# Patient Record
Sex: MALE | Race: WHITE | NOT HISPANIC OR LATINO | Employment: FULL TIME | URBAN - METROPOLITAN AREA
[De-identification: names, ages, dates, MRNs, and addresses within clinical notes are randomized per-mention and may not be internally consistent; named-entity substitution may affect disease eponyms.]

---

## 2017-01-05 ENCOUNTER — TELEPHONE (OUTPATIENT)
Dept: SLEEP MEDICINE | Facility: OTHER | Age: 52
End: 2017-01-05

## 2017-01-05 ENCOUNTER — PATIENT MESSAGE (OUTPATIENT)
Dept: ADMINISTRATIVE | Facility: OTHER | Age: 52
End: 2017-01-05

## 2017-01-05 NOTE — TELEPHONE ENCOUNTER
Left message to reschedule his sleep study he canceled on Nov 16th.  Sending out a message through my [facility] to schedule.

## 2017-01-09 ENCOUNTER — TELEPHONE (OUTPATIENT)
Dept: SLEEP MEDICINE | Facility: OTHER | Age: 52
End: 2017-01-09

## 2017-01-12 ENCOUNTER — TELEPHONE (OUTPATIENT)
Dept: SLEEP MEDICINE | Facility: OTHER | Age: 52
End: 2017-01-12

## 2017-01-13 ENCOUNTER — CLINICAL SUPPORT (OUTPATIENT)
Dept: INTERNAL MEDICINE | Facility: CLINIC | Age: 52
End: 2017-01-13
Payer: COMMERCIAL

## 2017-01-13 VITALS — BODY MASS INDEX: 31.13 KG/M2 | WEIGHT: 216.94 LBS

## 2017-01-13 NOTE — PROGRESS NOTES
Health  Follow-Up Note   Date: 1/13/17                 Time: 0730  [x] Office  [] Phone  Notes from previous session reviewed.   [x] Previous Session Goals unchanged.   [] Patient/Caregiver Change in Goals.  Goals added or changed by Patient/Caregiver since program participation:  1.  Get back on track     2. Doing sleep study  3. Getting orthotics for shoes  4. Getting new glasses  5. Started a Health Savings account     Additional/Changed support that patient/caregiver has experienced/sought?  (Indicate readiness, support from family, friends, others, community groups, etc)  1. coworkers     Additional/Changed obstacles that could prevent patient/caregiver from reaching their goals?  1.  Vacation, Holidays    Feedback provided:  1.  Praised for continued effort   2. States he is feeling good, building muscle working out    Diagnostic values/Desriptors for follow-up as needed for chronic condition(s)   Weight: 98.4 kg 216 lb 14.9 oz     Interventions:   1. Health  listened reflectively, validated thoughts and feelings, offered support and encouragement.   2. Allowed patient to express themselves in a non-biased atmosphere.  3. Health  assisted pt to problem-solve obstacles such as being in a challenging environment and dealing with these challenges.   4. Motivational Interviewed interventions utilized (OARS).   5. Patient responded favorably to interventions and remained actively engaged in the session.   6. Health  will remain available and connected for patient by phone and/or office visits.   7. Positive reinforcement, emotional support and encouragement provided.   8. Focused Education: MI, MCT oil    Plan:  [x] Pt will work on goals as stated above.   [x] Pt will contact Health  for any questions, concerns or needs.  [x] Pt will follow up with Health  in office on 2/24/17 @ 0730.       [] Pt will follow up with Health  on phone in:        [x] Health  will remain  available.   [] Health  will contact patient by phone in:        [] Health  will consult:        [] Health  will inform Provider via EPIC messaging.     Impression:  1. Behavior is consistent with  Action     Stage of Change.   2. Participation level:       [x] Receptive      [x] Interactive      [] Guarded and Resistant      [x] Self Motivated      [] Refused/Declined to participate   3. [x] Pt voiced understanding of all information presented.       [] Pt voiced needing further information/education. This will be arranged.       [x] Pt would benefit from further education/information as identified by this health . This will be arranged.     Laura Burch RN HC

## 2017-01-18 DIAGNOSIS — E11.9 TYPE 2 DIABETES MELLITUS WITHOUT COMPLICATION: ICD-10-CM

## 2017-01-19 ENCOUNTER — HOSPITAL ENCOUNTER (OUTPATIENT)
Dept: SLEEP MEDICINE | Facility: OTHER | Age: 52
Discharge: HOME | End: 2017-01-19
Attending: NURSE PRACTITIONER
Payer: COMMERCIAL

## 2017-01-19 ENCOUNTER — PATIENT MESSAGE (OUTPATIENT)
Dept: INTERNAL MEDICINE | Facility: CLINIC | Age: 52
End: 2017-01-19

## 2017-01-19 ENCOUNTER — PATIENT MESSAGE (OUTPATIENT)
Dept: SLEEP MEDICINE | Facility: CLINIC | Age: 52
End: 2017-01-19

## 2017-01-19 DIAGNOSIS — G47.33 OBSTRUCTIVE SLEEP APNEA: ICD-10-CM

## 2017-01-19 PROCEDURE — 95811 POLYSOM 6/>YRS CPAP 4/> PARM: CPT | Mod: 26,,, | Performed by: PSYCHIATRY & NEUROLOGY

## 2017-01-20 PROCEDURE — 95811 POLYSOM 6/>YRS CPAP 4/> PARM: CPT

## 2017-01-23 ENCOUNTER — PATIENT MESSAGE (OUTPATIENT)
Dept: ADMINISTRATIVE | Facility: HOSPITAL | Age: 52
End: 2017-01-23

## 2017-01-30 DIAGNOSIS — G47.33 OBSTRUCTIVE SLEEP APNEA: Primary | ICD-10-CM

## 2017-01-30 NOTE — PROCEDURES
"See imported Sleep Study result in "Chart Review" under the "Media tab".     (This Sleep Study was interpreted by a Board Certified Sleep Specialist who conducted an epoch-by-epoch review of the entire raw data recording.)    (The indication for this sleep study was reviewed and deemed appropriate by [facility] Practice Parameters or other reasons by a Board Certified Sleep Specialist.)    "

## 2017-02-04 ENCOUNTER — TELEPHONE (OUTPATIENT)
Dept: SLEEP MEDICINE | Facility: CLINIC | Age: 52
End: 2017-02-04

## 2017-02-04 NOTE — TELEPHONE ENCOUNTER
----- Message from My Tsai sent at 2/3/2017  3:00 PM CST -----  Regarding: My  Contact: [phone]  Np. Gianni,       I apoligize he does not have Medicare [facility] can service him but he needs to come in to have a F2F before he can get a new unit. I will make him aware. Please have an appointment scheduled with your office.     Thank you.    My

## 2017-02-13 ENCOUNTER — PATIENT MESSAGE (OUTPATIENT)
Dept: SLEEP MEDICINE | Facility: CLINIC | Age: 52
End: 2017-02-13

## 2017-02-16 ENCOUNTER — OFFICE VISIT (OUTPATIENT)
Dept: SLEEP MEDICINE | Facility: CLINIC | Age: 52
End: 2017-02-16
Payer: COMMERCIAL

## 2017-02-16 VITALS
HEIGHT: 70 IN | SYSTOLIC BLOOD PRESSURE: 150 MMHG | HEART RATE: 76 BPM | WEIGHT: 215.38 LBS | DIASTOLIC BLOOD PRESSURE: 94 MMHG | BODY MASS INDEX: 30.83 KG/M2

## 2017-02-16 DIAGNOSIS — G47.33 OBSTRUCTIVE SLEEP APNEA: Primary | ICD-10-CM

## 2017-02-16 PROCEDURE — 3077F SYST BP >= 140 MM HG: CPT | Mod: S$GLB,,, | Performed by: NURSE PRACTITIONER

## 2017-02-16 PROCEDURE — 99999 PR PBB SHADOW E&M-EST. PATIENT-LVL III: CPT | Mod: PBBFAC,,, | Performed by: NURSE PRACTITIONER

## 2017-02-16 PROCEDURE — 3080F DIAST BP >= 90 MM HG: CPT | Mod: S$GLB,,, | Performed by: NURSE PRACTITIONER

## 2017-02-16 PROCEDURE — 99213 OFFICE O/P EST LOW 20 MIN: CPT | Mod: S$GLB,,, | Performed by: NURSE PRACTITIONER

## 2017-02-16 NOTE — PROGRESS NOTES
Wily Rodrigues  was seen as a f/u for the management of obstructive sleep apnea. Last seen 1/14/16.     Since then, he has undergone a titration study and plans to get setup apap 10-20cm. Sleeps only on side. Without therapy, he reports snoring. Rev'd titration stuyd with him today.     HISTORY:  1/14/16:  CHIEF COMPLAINT: CPAP use    HISTORY OF PRESENT ILLNESS:Wily Rodrigues a 51 y.o.  male presents for the management of obstructive sleep apnea. This is his initial visit in Sleep Clinic. Previously seen by Dr. Lee in different dept 2009.  He was diagnosed with AJ previously and has been using it inconsistently of late. Split-night study in 2009 revealed effective pressure of 22/20cm which was intolerable to him. A bipap titration study in 2012 (221#) revealed needed pressures up to 20/16cm to eliminate snoring. Mouth leak and arousals could have contributed to suboptimal pressures. He was switched to autobipap therapy 16/14cm. He has used it inconsistently. He is motivated now to begin using it better. He is seeing a health  (turns 51 next month). He uses a nasal mask. He has resumed using it recently for a short time. Without therapy, he reports snoring. He report symptoms of disruptive snoring, un-refreshing sleep, and excessive daytime sleepiness.     Denies symptoms of restless legs or kicking during sleep.     On todays [place] Sleepiness Scale the patient scores a 13/24.     Interrogation: old remstar machine fair condition, manometer 12cm. Heat at 4. Total therapy hrs 1517h. 30d avg 0:36h/n    PSG 3/27/09: (207#) RDI 9.3 (REM RDI 22.3)/low sat 76%--? 22/20cm bipap  Titration study in epic    BT:11p, 12a off day  SL: 30-60min  Disruptions: nocturia 1-2x  Sleep quality: unrefreshing     FAMILY HISTORY: No known sleep disorders. Parents snore  SOCIAL HISTORY: Rare etoh    REVIEW OF SYSTEMS:  Sleep related symptoms as per HPI; Positive overweight; occasional sinus congestion; +dry mouth in  "mornings;  + dyspnea; Denies palpitations; occasional acid reflux; Denies polyuria; Denies AM headaches;Denies mood disturbance.  Otherwise, a balance of 10 systems reviewed is negative        PHYSICAL EXAM:     Visit Vitals    BP (!) 150/94    Pulse 76    Ht 5' 10" (1.778 m)    Wt 97.7 kg (215 lb 6.2 oz)    BMI 30.91 kg/m2   GENERAL: W/D, W/N body habitus, well groomed         ASSESSMENT:     Obstructive sleep apnea (AJ), mild,previously intolerable to bipap high pressure settings. 2009 switched to autobipap with persistent use of therapy, but limited. Mouth leaks potential contributing factor. Ready to resume consistent use of PAP therapy for improvement of snoring, EDS, disrupted sleep. Needs new supplies/eligible for new machine. 2/16/17: Had titration study and PAP fixed effective for side sleep, unknown for supine REM. Apap setup planned today  He has medical comorbidities of obesity, hypertension, DM2, hypothyroidism.    PLAN:   1. APAP 10-20cm setup today [facility], unable to have appt tomorrow or later today anywhere else. FFM. RTC 4-5 wks adherence monitoring   2. Discussed etiology of AJ and potential ramifications of untreated AJ, including stroke, heart disease, HTN.    3. The patient was advised to abstain from driving should he feel sleepy or drowsy.   4. Encouraged weight loss efforts for potential improvement of AJ and overall health benefits        "

## 2017-02-24 ENCOUNTER — LAB VISIT (OUTPATIENT)
Dept: LAB | Facility: HOSPITAL | Age: 52
End: 2017-02-24
Attending: INTERNAL MEDICINE
Payer: COMMERCIAL

## 2017-02-24 ENCOUNTER — CLINICAL SUPPORT (OUTPATIENT)
Dept: INTERNAL MEDICINE | Facility: CLINIC | Age: 52
End: 2017-02-24
Payer: COMMERCIAL

## 2017-02-24 VITALS — BODY MASS INDEX: 30.97 KG/M2 | WEIGHT: 215.81 LBS

## 2017-02-24 DIAGNOSIS — E11.9 TYPE 2 DIABETES MELLITUS WITHOUT COMPLICATION: ICD-10-CM

## 2017-02-24 PROCEDURE — 36415 COLL VENOUS BLD VENIPUNCTURE: CPT

## 2017-02-24 PROCEDURE — 83036 HEMOGLOBIN GLYCOSYLATED A1C: CPT

## 2017-02-24 NOTE — PROGRESS NOTES
Health  Follow-Up Note   Date: 2/24/                 Time: 1530  [] Office  [x] Phone  Notes from previous session reviewed.   [] Previous Session Goals unchanged.   [] Patient/Caregiver Change in Goals.  Goals added or changed by Patient/Caregiver since program participation:  1.    Gym 2 x week, volleyball 1 x week   2. Walking Qday   3. Using CPAP nightly  4. Continue eliminate sugar     Additional/Changed support that patient/caregiver has experienced/sought?  (Indicate readiness, support from family, friends, others, community groups, etc)  1.  coworkers, friends    Additional/Changed obstacles that could prevent patient/caregiver from reaching their goals?  1.  Julio Cesar Garcia    Feedback provided:  1.  Praised for great job continued effort    Diagnostic values/Desriptors for follow-up as needed for chronic condition(s)   Weight: 79.9 kg 215 lb 13.3 oz    Interventions:   1. Health  listened reflectively, validated thoughts and feelings, offered support and encouragement.   2. Allowed patient to express themselves in a non-biased atmosphere.  3. Health  assisted pt to problem-solve obstacles such as being in a challenging environment and dealing with these challenges.   4. Motivational Interviewed interventions utilized (OARS).   5. Patient responded favorably to interventions and remained actively engaged in the session.   6. Health  will remain available and connected for patient by phone and/or office visits.   7. Positive reinforcement, emotional support and encouragement provided.   8. Focused Education: MI, sugar    Plan:  [x] Pt will work on goals as stated above.   [x] Pt will contact Health  for any questions, concerns or needs.  [x] Pt will follow up with Health  in office 3/15/17 at 730.        [] Pt will follow up with Health  on phone in:        [x] Health  will remain available.   [] Health  will contact patient by phone in:        [] Health  will  consult:        [] Health  will inform Provider via EPIC messaging.     Impression:  1. Behavior is consistent with  Action     Stage of Change.   2. Participation level:       [x] Receptive      [x] Interactive      [] Guarded and Resistant      [x] Self Motivated      [] Refused/Declined to participate   3. [x] Pt voiced understanding of all information presented.       [] Pt voiced needing further information/education. This will be arranged.       [x] Pt would benefit from further education/information as identified by this health . This will be arranged.     Laura Burch RN HC

## 2017-02-25 LAB
ESTIMATED AVG GLUCOSE: 114 MG/DL
HBA1C MFR BLD HPLC: 5.6 %

## 2017-03-15 ENCOUNTER — OFFICE VISIT (OUTPATIENT)
Dept: INTERNAL MEDICINE | Facility: CLINIC | Age: 52
End: 2017-03-15
Payer: COMMERCIAL

## 2017-03-15 VITALS
HEART RATE: 77 BPM | HEIGHT: 70 IN | WEIGHT: 214.5 LBS | DIASTOLIC BLOOD PRESSURE: 95 MMHG | BODY MASS INDEX: 30.71 KG/M2 | SYSTOLIC BLOOD PRESSURE: 158 MMHG

## 2017-03-15 DIAGNOSIS — I10 ESSENTIAL HYPERTENSION: Primary | ICD-10-CM

## 2017-03-15 DIAGNOSIS — Z00.00 ROUTINE CHECK-UP: ICD-10-CM

## 2017-03-15 DIAGNOSIS — E11.9 TYPE 2 DIABETES MELLITUS WITHOUT COMPLICATION, WITHOUT LONG-TERM CURRENT USE OF INSULIN: ICD-10-CM

## 2017-03-15 PROCEDURE — 3080F DIAST BP >= 90 MM HG: CPT | Mod: S$GLB,,, | Performed by: INTERNAL MEDICINE

## 2017-03-15 PROCEDURE — 2022F DILAT RTA XM EVC RTNOPTHY: CPT | Mod: S$GLB,,, | Performed by: INTERNAL MEDICINE

## 2017-03-15 PROCEDURE — 3077F SYST BP >= 140 MM HG: CPT | Mod: S$GLB,,, | Performed by: INTERNAL MEDICINE

## 2017-03-15 PROCEDURE — 4010F ACE/ARB THERAPY RXD/TAKEN: CPT | Mod: S$GLB,,, | Performed by: INTERNAL MEDICINE

## 2017-03-15 PROCEDURE — 99214 OFFICE O/P EST MOD 30 MIN: CPT | Mod: S$GLB,,, | Performed by: INTERNAL MEDICINE

## 2017-03-15 PROCEDURE — 1160F RVW MEDS BY RX/DR IN RCRD: CPT | Mod: S$GLB,,, | Performed by: INTERNAL MEDICINE

## 2017-03-15 PROCEDURE — 99999 PR PBB SHADOW E&M-EST. PATIENT-LVL III: CPT | Mod: PBBFAC,,, | Performed by: INTERNAL MEDICINE

## 2017-03-15 PROCEDURE — 3044F HG A1C LEVEL LT 7.0%: CPT | Mod: S$GLB,,, | Performed by: INTERNAL MEDICINE

## 2017-03-15 RX ORDER — DEXMETHYLPHENIDATE HYDROCHLORIDE 10 MG/1
10 CAPSULE, EXTENDED RELEASE ORAL DAILY
COMMUNITY
End: 2017-03-15

## 2017-03-15 RX ORDER — LISINOPRIL 40 MG/1
40 TABLET ORAL DAILY
Qty: 90 TABLET | Refills: 3 | Status: SHIPPED | OUTPATIENT
Start: 2017-03-15 | End: 2018-02-14 | Stop reason: SDUPTHER

## 2017-03-15 NOTE — MR AVS SNAPSHOT
[facility]  [address]  [facility] [address]  Phone: [phone]  Fax: [phone]                  Wily Rodrigues   3/15/2017 8:00 AM   Office Visit    Description:  Male : 1965   Provider:  Eliseo Rogers Jr., MD   Department:  [facility]           Reason for Visit     Annual Exam           Diagnoses this Visit        Comments    Essential hypertension    -  Primary     Type 2 diabetes mellitus without complication, without long-term current use of insulin         Routine check-up                To Do List           Future Appointments        Provider Department Dept Phone    2017 8:20 AM Eliseo Rogers Jr., MD [facility] [phone]      To Schedule:     Please call the Endoscopy Department at [phone] to schedule your appointment.          Goals (5 Years of Data)     None      Follow-Up and Disposition     Return in about 3 months (around 6/15/2017).       These Medications        Disp Refills Start End    lisinopril (PRINIVIL,ZESTRIL) 40 MG tablet 90 tablet 3 3/15/2017 3/15/2018    Take 1 tablet (40 mg total) by mouth once daily. - Oral    Pharmacy: [id number] - [place], [place] - [address] Ph #: [phone]         [facility] On [facility]     [facility] On Call Nurse Care Line - 24/7 Assistance  Registered nurses in the [facility] On Call Center provide clinical advisement, health education, appointment booking, and other advisory services.  Call for this free service at [phone].             Medications           Message regarding Medications     Verify the changes and/or additions to your medication regime listed below are the same as discussed with your clinician today.  If any of these changes or additions are incorrect, please notify your healthcare provider.        START taking these NEW medications        Refills    lisinopril (PRINIVIL,ZESTRIL) 40 MG tablet 3    Sig: Take 1  "tablet (40 mg total) by mouth once daily.    Class: Normal    Route: Oral      STOP taking these medications     dexmethylphenidate (FOCALIN XR) 10 MG 24 hr capsule Take 10 mg by mouth once daily.           Verify that the below list of medications is an accurate representation of the medications you are currently taking.  If none reported, the list may be blank. If incorrect, please contact your healthcare provider. Carry this list with you in case of emergency.           Current Medications     albuterol (VENTOLIN HFA) 90 mcg/actuation inhaler Inhale 2 puffs into the lungs every 6 (six) hours as needed for Wheezing.    atorvastatin (LIPITOR) 20 MG tablet TAKE 1 TABLET BY MOUTH DAILY    blood sugar diagnostic (ACCU-CHEK SMARTVIEW TEST STRIP) Strp 1 strip by Misc.(Non-Drug; Combo Route) route 2 (two) times daily.    CYMBALTA 60 mg capsule Take 1 capsule by mouth Daily.    lancets (ACCU-CHEK FASTCLIX) Misc 1 lancet by Misc.(Non-Drug; Combo Route) route 2 (two) times daily.    levothyroxine (SYNTHROID) 25 MCG tablet TAKE 1 TABLET BY MOUTH EVERY DAY BEFORE BREAKFAST    methylphenidate (CONCERTA) 36 MG CR tablet Take 1 tablet (36 mg total) by mouth once daily.    triamterene-hydrochlorothiazide 37.5-25 mg (MAXZIDE-25) 37.5-25 mg per tablet Take 1 tablet by mouth once daily.    lisinopril (PRINIVIL,ZESTRIL) 40 MG tablet Take 1 tablet (40 mg total) by mouth once daily.           Clinical Reference Information           Your Vitals Were     BP Pulse Height Weight BMI    158/95 (BP Location: Left arm, Patient Position: Sitting, BP Method: Automatic) 77 5' 10" (1.778 m) 97.3 kg (214 lb 8.1 oz) 30.78 kg/m2      Blood Pressure          Most Recent Value    BP  (!)  158/95      Allergies as of 3/15/2017     Compazine [Prochlorperazine Edisylate]      Immunizations Administered on Date of Encounter - 3/15/2017     None      Orders Placed During Today's Visit      Normal Orders This Visit    Case request GI: COLONOSCOPY     "   Language Assistance Services     ATTENTION: Language assistance services are available, free of charge. Please call [phone].      ATENCIÓN: Si habla tucker, tiene a myers disposición servicios gratuitos de asistencia lingüística. Llame al [phone].     CHÚ Ý: N?u b?n nói Ti?ng Vi?t, có các d?ch v? h? tr? ngôn ng? mi?n phí dành cho b?n. G?i s? [phone].         [facility] complies with applicable Federal civil rights laws and does not discriminate on the basis of race, color, national origin, age, disability, or sex.

## 2017-03-15 NOTE — PROGRESS NOTES
Subjective:       Patient ID: Wily Rodrigues is a 52 y.o. male.    Chief Complaint: Annual Exam    HPI the patient is a 52-year-old male comes in for general checkup and follow-up of his hypertension and diabetes.  The patient reports that his glucose at home have been routinely under good control between 100 and 120.  He has been adhering to his diet fairly well.  His weight has remained relatively stable.  He is not having any chest pain or shortness of breath.  Review of Systems   Constitutional: Negative for appetite change, fatigue and unexpected weight change.   HENT: Negative for congestion and sore throat.    Eyes: Negative for visual disturbance.   Respiratory: Negative for cough, chest tightness, shortness of breath and wheezing.    Cardiovascular: Negative for chest pain and palpitations.   Gastrointestinal: Negative for abdominal distention, abdominal pain, blood in stool, constipation, diarrhea and nausea.   Genitourinary: Negative for difficulty urinating, dysuria, frequency and urgency.   Musculoskeletal: Negative for arthralgias, myalgias and neck stiffness.   Skin: Negative for rash.   Neurological: Negative for dizziness, weakness and headaches.   Psychiatric/Behavioral: Negative for behavioral problems, hallucinations and self-injury.       Objective:      Physical Exam   Constitutional: He is oriented to person, place, and time. He appears well-developed. No distress.   HENT:   Head: Normocephalic.   Mouth/Throat: No oropharyngeal exudate.   Eyes: Conjunctivae and EOM are normal. Pupils are equal, round, and reactive to light. Right eye exhibits no discharge. Left eye exhibits no discharge. No scleral icterus.   Fundi benign bilaterally.   Neck: Normal range of motion. No JVD present. No tracheal deviation present. No thyromegaly present.   Cardiovascular: Normal rate, regular rhythm and normal heart sounds.  Exam reveals no gallop and no friction rub.    No murmur heard.  Pulmonary/Chest:  Effort normal and breath sounds normal. No respiratory distress. He has no wheezes. He has no rales. He exhibits no tenderness.   Abdominal: Soft. Bowel sounds are normal. He exhibits no distension and no mass. There is no tenderness. There is no rebound and no guarding.   Musculoskeletal: Normal range of motion. He exhibits no edema or tenderness.   Lymphadenopathy:     He has no cervical adenopathy.   Neurological: He is alert and oriented to person, place, and time. He has normal reflexes. He displays normal reflexes. No cranial nerve deficit. He exhibits normal muscle tone. Coordination normal.   Skin: Skin is warm and dry. No rash noted. He is not diaphoretic. No erythema. No pallor.   Psychiatric: He has a normal mood and affect. His behavior is normal. Thought content normal.       Assessment:       1. Essential hypertension    2. Type 2 diabetes mellitus without complication, without long-term current use of insulin    3. Routine check-up        Plan:     1.  Increase lisinopril to 40 mg by mouth daily  2.  Screening colonoscopy  3.  Return to clinic in 3 months

## 2017-05-04 RX ORDER — LEVOTHYROXINE SODIUM 25 UG/1
TABLET ORAL
Qty: 90 TABLET | Refills: 0 | Status: SHIPPED | OUTPATIENT
Start: 2017-05-04 | End: 2017-08-17 | Stop reason: SDUPTHER

## 2017-05-16 RX ORDER — ATORVASTATIN CALCIUM 20 MG/1
TABLET, FILM COATED ORAL
Qty: 90 TABLET | Refills: 3 | Status: SHIPPED | OUTPATIENT
Start: 2017-05-16 | End: 2018-08-07 | Stop reason: SDUPTHER

## 2017-06-27 ENCOUNTER — PATIENT MESSAGE (OUTPATIENT)
Dept: INTERNAL MEDICINE | Facility: CLINIC | Age: 52
End: 2017-06-27

## 2017-06-28 ENCOUNTER — PATIENT MESSAGE (OUTPATIENT)
Dept: INTERNAL MEDICINE | Facility: CLINIC | Age: 52
End: 2017-06-28

## 2017-06-28 ENCOUNTER — OFFICE VISIT (OUTPATIENT)
Dept: INTERNAL MEDICINE | Facility: CLINIC | Age: 52
End: 2017-06-28
Payer: COMMERCIAL

## 2017-06-28 VITALS
HEIGHT: 70 IN | HEART RATE: 70 BPM | SYSTOLIC BLOOD PRESSURE: 165 MMHG | WEIGHT: 222.88 LBS | DIASTOLIC BLOOD PRESSURE: 81 MMHG | BODY MASS INDEX: 31.91 KG/M2

## 2017-06-28 DIAGNOSIS — I10 ESSENTIAL HYPERTENSION: ICD-10-CM

## 2017-06-28 DIAGNOSIS — E11.9 TYPE 2 DIABETES MELLITUS WITHOUT COMPLICATION, WITHOUT LONG-TERM CURRENT USE OF INSULIN: Primary | ICD-10-CM

## 2017-06-28 DIAGNOSIS — E03.9 ACQUIRED HYPOTHYROIDISM: ICD-10-CM

## 2017-06-28 PROCEDURE — 3044F HG A1C LEVEL LT 7.0%: CPT | Mod: S$GLB,,, | Performed by: INTERNAL MEDICINE

## 2017-06-28 PROCEDURE — 99213 OFFICE O/P EST LOW 20 MIN: CPT | Mod: S$GLB,,, | Performed by: INTERNAL MEDICINE

## 2017-06-28 PROCEDURE — 99999 PR PBB SHADOW E&M-EST. PATIENT-LVL III: CPT | Mod: PBBFAC,,, | Performed by: INTERNAL MEDICINE

## 2017-06-28 PROCEDURE — 4010F ACE/ARB THERAPY RXD/TAKEN: CPT | Mod: S$GLB,,, | Performed by: INTERNAL MEDICINE

## 2017-06-28 RX ORDER — AMLODIPINE BESYLATE 5 MG/1
5 TABLET ORAL DAILY
Qty: 30 TABLET | Refills: 11 | Status: SHIPPED | OUTPATIENT
Start: 2017-06-28 | End: 2017-10-02

## 2017-06-28 RX ORDER — VALACYCLOVIR HYDROCHLORIDE 1 G/1
1000 TABLET, FILM COATED ORAL 2 TIMES DAILY
Refills: 3 | COMMUNITY
Start: 2017-06-19 | End: 2020-05-17 | Stop reason: SDUPTHER

## 2017-06-28 NOTE — PROGRESS NOTES
Subjective:       Patient ID: Wily Rodrigues is a 52 y.o. male.    Chief Complaint: Follow-up    HPI the patient is a 52-year-old male comes in for follow-up of his hypertension after increasing lisinopril.  He is tolerating the higher dose well.  He has not been monitoring his blood pressure regularly at home.  He is not having any chest pain, shortness of breath, or cough.  He is noticing mild exacerbations of dyspnea on exertion with playing volleyball and has curtailed his activities.  He states that this typically happens in warm humid weather.  He has noticed a cyst on his right back for which she requested further evaluation  Review of Systems   Constitutional: Negative.  Negative for activity change and unexpected weight change.   Respiratory: Negative for chest tightness and shortness of breath.    Cardiovascular: Negative for chest pain and palpitations.       Objective:      Physical Exam   Constitutional: He appears well-developed and well-nourished. No distress.   Cardiovascular: Normal rate, regular rhythm and normal heart sounds.  Exam reveals no gallop and no friction rub.    No murmur heard.  Pulmonary/Chest: Effort normal and breath sounds normal. No respiratory distress. He has no wheezes. He has no rales.   Skin: He is not diaphoretic.   A 3 cm cyst is present near the right scapula on the back.  It's nontender.  There is slight redness overlying it.   Vitals reviewed.      Assessment:       1. Type 2 diabetes mellitus without complication, without long-term current use of insulin    2. Essential hypertension    3. Acquired hypothyroidism      4.  Right back cyst  Plan:       1.  Intensify antihypertensive therapy with Norvasc 5 mg by mouth daily  2.  Return to clinic in 3 months for physical exam with previsit laboratory studies

## 2017-07-06 ENCOUNTER — CLINICAL SUPPORT (OUTPATIENT)
Dept: INTERNAL MEDICINE | Facility: CLINIC | Age: 52
End: 2017-07-06
Payer: COMMERCIAL

## 2017-07-06 VITALS — DIASTOLIC BLOOD PRESSURE: 88 MMHG | SYSTOLIC BLOOD PRESSURE: 140 MMHG

## 2017-07-06 PROCEDURE — 99999 PR PBB SHADOW E&M-EST. PATIENT-LVL I: CPT | Mod: PBBFAC,,,

## 2017-07-06 NOTE — PROGRESS NOTES
Health  Follow-Up Note   [x] Office  [] Phone  Notes from previous session reviewed.   [x] Previous Session Goals unchanged.   [x] Patient/Caregiver Change in Goals.  Goals added or changed by Patient/Caregiver since program participation:  Lose weight (10-20 lbs.)   Bring Blood pressure down   Reduce carb intake / re-focus food choices   More walking/biking   Try to breathe in July and August with asthma   Meditate and laugh   Serenity/world peace     Additional/Changed support that patient/caregiver has experienced/sought?  (Indicate readiness, support from family, friends, others, community groups, etc)  1.  New room mate eats healthy    Additional/Changed obstacles that could prevent patient/caregiver from reaching their goals?  1.  Heat/ asthma    Feedback provided:  1. Praised for continued effort   2. MI  3. Continued support     Diagnostic values/Desriptors for follow-up as needed for chronic condition(s)   Weight:100.7 kg 222 lb  Blood Glucose: 134 this am  /88 today in office - down from last visit at PCP(165/81)    Interventions:   1. Health  listened reflectively, validated thoughts and feelings, offered support and encouragement.   2. Allowed patient to express themselves in a non-biased atmosphere.  3. Health  assisted pt to problem-solve obstacles such as being in a challenging environment and dealing with these challenges.   4. Motivational Interviewed interventions utilized (OARS).   5. Patient responded favorably to interventions and remained actively engaged in the session.   6. Health  will remain available and connected for patient by phone and/or office visits.   7. Positive reinforcement, emotional support and encouragement provided.   8. Focused Education: MI, recipes, exercise    Plan:  [x] Pt will work on goals as stated above.   [x] Pt will contact Health  for any questions, concerns or needs.  [x] Pt will follow up with Health  in office on   7/20/17 at  483     [] Pt will follow up with Health  on phone in:        [x] Health  will remain available.   [] Health  will contact patient by phone in:        [] Health  will consult:        [] Health  will inform Provider via EPIC messaging.     Impression:  1. Behavior is consistent with  Action     Stage of Change.   2. Participation level:       [x] Receptive      [x] Interactive      [] Guarded and Resistant      [x] Self Motivated      [] Refused/Declined to participate   3. [x] Pt voiced understanding of all information presented.       [] Pt voiced needing further information/education. This will be arranged.       [x] Pt would benefit from further education/information as identified by this health . This will be arranged.     Laura Burch RN HC

## 2017-07-27 ENCOUNTER — CLINICAL SUPPORT (OUTPATIENT)
Dept: INTERNAL MEDICINE | Facility: CLINIC | Age: 52
End: 2017-07-27
Payer: COMMERCIAL

## 2017-07-27 VITALS
BODY MASS INDEX: 29.73 KG/M2 | DIASTOLIC BLOOD PRESSURE: 78 MMHG | WEIGHT: 207.25 LBS | SYSTOLIC BLOOD PRESSURE: 134 MMHG

## 2017-07-27 PROCEDURE — 99999 PR PBB SHADOW E&M-EST. PATIENT-LVL I: CPT | Mod: PBBFAC,,,

## 2017-07-27 NOTE — PROGRESS NOTES
Health  Follow-Up Note   [x] Office  [] Phone  Notes from previous session reviewed.   [x] Previous Session Goals unchanged.   [] Patient/Caregiver Change in Goals.  Goals added or changed by Patient/Caregiver since program participation:  1.   Continue same plan       Additional/Changed obstacles that could prevent patient/caregiver from reaching their goals?  1.  Going on vacation today to [facility] for 10 days.     Feedback provided:  1.  Praised for great job down 15 lbs in 3 weeks!!    Diagnostic values/Desriptors for follow-up as needed for chronic condition(s)   Weight: 94 kg 207 lbs down 15 lbs   Blood Glucose: 100 this am  B/P: 134/78    Interventions:   1. Health  listened reflectively, validated thoughts and feelings, offered support and encouragement.   2. Allowed patient to express themselves in a non-biased atmosphere.  3. Health  assisted pt to problem-solve obstacles such as being in a challenging environment and dealing with these challenges.   4. Motivational Interviewed interventions utilized (OARS).   5. Patient responded favorably to interventions and remained actively engaged in the session.   6. Health  will remain available and connected for patient by phone and/or office visits.   7. Positive reinforcement, emotional support and encouragement provided.   8. Focused Education: MI, continue to decrease carbs (off bread) working well    Plan:  [x] Pt will work on goals as stated above.   [x] Pt will contact Health  for any questions, concerns or needs.  [x] Pt will follow up with Health  in office on    8/11/17 at 0730.   [] Pt will follow up with Health  on phone in:        [x] Health  will remain available.   [] Health  will contact patient by phone in:        [] Health  will consult:        [] Health  will inform Provider via EPIC messaging.     Impression:  1. Behavior is consistent with    Action   Stage of Change.    2. Participation level:       [x] Receptive      [x] Interactive      [] Guarded and Resistant      [x] Self Motivated      [] Refused/Declined to participate   3. [x] Pt voiced understanding of all information presented.       [] Pt voiced needing further information/education. This will be arranged.       [x] Pt would benefit from further education/information as identified by this health . This will be arranged.     Laura Burch RN HC

## 2017-08-18 RX ORDER — LEVOTHYROXINE SODIUM 25 UG/1
TABLET ORAL
Qty: 90 TABLET | Refills: 0 | Status: SHIPPED | OUTPATIENT
Start: 2017-08-18 | End: 2017-10-17 | Stop reason: SDUPTHER

## 2017-08-24 ENCOUNTER — CLINICAL SUPPORT (OUTPATIENT)
Dept: INTERNAL MEDICINE | Facility: CLINIC | Age: 52
End: 2017-08-24
Payer: COMMERCIAL

## 2017-08-24 VITALS
BODY MASS INDEX: 30.24 KG/M2 | SYSTOLIC BLOOD PRESSURE: 120 MMHG | WEIGHT: 210.75 LBS | DIASTOLIC BLOOD PRESSURE: 84 MMHG

## 2017-08-24 PROCEDURE — 99999 PR PBB SHADOW E&M-EST. PATIENT-LVL I: CPT | Mod: PBBFAC,,,

## 2017-08-24 NOTE — PROGRESS NOTES
Health  Follow-Up Note   [x] Office  [] Phone  Notes from previous session reviewed.   [x] Previous Session Goals unchanged.   [] Patient/Caregiver Change in Goals.  Goals added or changed by Patient/Caregiver since program participation:  1.     Continue same plan stay on track      Additional/Changed support that patient/caregiver has experienced/sought?  (Indicate readiness, support from family, friends, others, community groups, etc)  1.  friends     Additional/Changed obstacles that could prevent patient/caregiver from reaching their goals?  1.  vacation    Feedback provided:  1. Praised for continued effort and determination     Diagnostic values/Desriptors for follow-up as needed for chronic condition(s)   Weight:  Blood Glucose: 119  /84 left arm reg cuff  128/86 right arm reg cuff    Interventions:   1. Health  listened reflectively, validated thoughts and feelings, offered support and encouragement.   2. Allowed patient to express themselves in a non-biased atmosphere.  3. Health  assisted pt to problem-solve obstacles such as being in a challenging environment and dealing with these challenges.   4. Motivational Interviewed interventions utilized (OARS).   5. Patient responded favorably to interventions and remained actively engaged in the session.   6. Health  will remain available and connected for patient by phone and/or office visits.   7. Positive reinforcement, emotional support and encouragement provided.   8. Focused Education: MI    Plan:  [x] Pt will work on goals as stated above.   [x] Pt will contact Health  for any questions, concerns or needs.  [x] Pt will follow up with Health  in office on   9/8/17 at 0730.     [] Pt will follow up with Health  on phone in:        [x] Health  will remain available.   [] Health  will contact patient by phone in:        [] Health  will consult:        [] Health  will inform Provider via [place]  messaging.     Impression:  1. Behavior is consistent with   Action    Stage of Change.   2. Participation level:       [x] Receptive      [x] Interactive      [] Guarded and Resistant      [x] Self Motivated      [] Refused/Declined to participate   3. [x] Pt voiced understanding of all information presented.       [] Pt voiced needing further information/education. This will be arranged.       [x] Pt would benefit from further education/information as identified by this health . This will be arranged.     Laura Burch RN HC

## 2017-09-08 ENCOUNTER — CLINICAL SUPPORT (OUTPATIENT)
Dept: INTERNAL MEDICINE | Facility: CLINIC | Age: 52
End: 2017-09-08
Payer: COMMERCIAL

## 2017-09-08 VITALS
WEIGHT: 215.38 LBS | SYSTOLIC BLOOD PRESSURE: 118 MMHG | BODY MASS INDEX: 30.91 KG/M2 | DIASTOLIC BLOOD PRESSURE: 80 MMHG

## 2017-09-08 NOTE — PROGRESS NOTES
Health  Follow-Up Note   [x] Office  [] Phone  Notes from previous session reviewed.   [x] Previous Session Goals unchanged.   [] Patient/Caregiver Change in Goals.  Goals added or changed by Patient/Caregiver since program participation:  1.  Get back on track  2. Start exercise with Volley ball this week     Additional/Changed support that patient/caregiver has experienced/sought?  (Indicate readiness, support from family, friends, others, community groups, etc)  1.  Friends Volleyball    Additional/Changed obstacles that could prevent patient/caregiver from reaching their goals?  1.  Not wearing Cpap    Feedback provided:  1.  Praised for continued effort    Diagnostic values/Desriptors for follow-up as needed for chronic condition(s)   Weight: 97.7 kg 215 lb 6.2 oz  Blood Glucose: 124  /80 left arm    Interventions:   1. Health  listened reflectively, validated thoughts and feelings, offered support and encouragement.   2. Allowed patient to express themselves in a non-biased atmosphere.  3. Health  assisted pt to problem-solve obstacles such as being in a challenging environment and dealing with these challenges.   4. Motivational Interviewed interventions utilized (OARS).   5. Patient responded favorably to interventions and remained actively engaged in the session.   6. Health  will remain available and connected for patient by phone and/or office visits.   7. Positive reinforcement, emotional support and encouragement provided.   8. Focused Education: MI    Plan:  [x] Pt will work on goals as stated above.   [x] Pt will contact Health  for any questions, concerns or needs.  [x] Pt will follow up with Health  in office on   9/22/17@ 730    [] Pt will follow up with Health  on phone in:        [x] Health  will remain available.   [] Health  will contact patient by phone in:        [] Health  will consult:        [] Health  will inform Provider via  EPIC messaging.     Impression:  1. Behavior is consistent with   Action    Stage of Change.   2. Participation level:       [x] Receptive      [x] Interactive      [] Guarded and Resistant      [x] Self Motivated      [] Refused/Declined to participate   3. [x] Pt voiced understanding of all information presented.       [] Pt voiced needing further information/education. This will be arranged.       [x] Pt would benefit from further education/information as identified by this health . This will be arranged.     Laura Burch RN HC

## 2017-09-13 RX ORDER — TRIAMTERENE/HYDROCHLOROTHIAZID 37.5-25 MG
1 TABLET ORAL DAILY
Qty: 90 TABLET | Refills: 0 | Status: SHIPPED | OUTPATIENT
Start: 2017-09-13 | End: 2017-10-17 | Stop reason: SDUPTHER

## 2017-09-22 ENCOUNTER — CLINICAL SUPPORT (OUTPATIENT)
Dept: INTERNAL MEDICINE | Facility: CLINIC | Age: 52
End: 2017-09-22
Payer: COMMERCIAL

## 2017-09-22 VITALS
SYSTOLIC BLOOD PRESSURE: 110 MMHG | BODY MASS INDEX: 30.46 KG/M2 | DIASTOLIC BLOOD PRESSURE: 76 MMHG | WEIGHT: 212.31 LBS

## 2017-09-22 PROCEDURE — 99999 PR PBB SHADOW E&M-EST. PATIENT-LVL I: CPT | Mod: PBBFAC,,,

## 2017-09-22 NOTE — PROGRESS NOTES
Health  Follow-Up Note   [x] Office  [] Phone  Notes from previous session reviewed.   [x] Previous Session Goals unchanged.   [] Patient/Caregiver Change in Goals.  Goals added or changed by Patient/Caregiver since program participation:  1.     Continue same plan  2. Add 3 min workout     Additional/Changed support that patient/caregiver has experienced/sought?  (Indicate readiness, support from family, friends, others, community groups, etc)  1.  Friends    Additional/Changed obstacles that could prevent patient/caregiver from reaching their goals?  1.  lack of preparation    Feedback provided:  1.  Praised for continued effort    Diagnostic values/Desriptors for follow-up as needed for chronic condition(s)   Weight: 96.3 kg 212 lb 4.9 oz  Blood Glucose: 134 while drinking coffee  /76    Interventions:   1. Health  listened reflectively, validated thoughts and feelings, offered support and encouragement.   2. Allowed patient to express themselves in a non-biased atmosphere.  3. Health  assisted pt to problem-solve obstacles such as being in a challenging environment and dealing with these challenges.   4. Motivational Interviewed interventions utilized (OARS).   5. Patient responded favorably to interventions and remained actively engaged in the session.   6. Health  will remain available and connected for patient by phone and/or office visits.   7. Positive reinforcement, emotional support and encouragement provided.   8. Focused Education: MI    Plan:  [x] Pt will work on goals as stated above.   [x] Pt will contact Health  for any questions, concerns or needs.  [x] Pt will follow up with Health  in office on    10/2/17 at 0830.    [] Pt will follow up with Health  on phone in:        [x] Health  will remain available.   [] Health  will contact patient by phone in:        [] Health  will consult:        [] Health  will inform Provider via [employer]  messaging.     Impression:  1. Behavior is consistent with     Action  Stage of Change.   2. Participation level:       [x] Receptive      [x] Interactive      [] Guarded and Resistant      [x] Self Motivated      [] Refused/Declined to participate   3. [x] Pt voiced understanding of all information presented.       [] Pt voiced needing further information/education. This will be arranged.       [x] Pt would benefit from further education/information as identified by this health . This will be arranged.     Laura Burch RN HC

## 2017-09-28 ENCOUNTER — LAB VISIT (OUTPATIENT)
Dept: LAB | Facility: HOSPITAL | Age: 52
End: 2017-09-28
Attending: INTERNAL MEDICINE
Payer: COMMERCIAL

## 2017-09-28 DIAGNOSIS — E11.9 TYPE 2 DIABETES MELLITUS WITHOUT COMPLICATION, WITHOUT LONG-TERM CURRENT USE OF INSULIN: ICD-10-CM

## 2017-09-28 DIAGNOSIS — E03.9 ACQUIRED HYPOTHYROIDISM: ICD-10-CM

## 2017-09-28 DIAGNOSIS — I10 ESSENTIAL HYPERTENSION: ICD-10-CM

## 2017-09-28 LAB
ALBUMIN SERPL BCP-MCNC: 4.1 G/DL
ALP SERPL-CCNC: 108 U/L
ALT SERPL W/O P-5'-P-CCNC: 44 U/L
ANION GAP SERPL CALC-SCNC: 14 MMOL/L
AST SERPL-CCNC: 30 U/L
BILIRUB SERPL-MCNC: 0.3 MG/DL
BUN SERPL-MCNC: 18 MG/DL
CALCIUM SERPL-MCNC: 9.6 MG/DL
CHLORIDE SERPL-SCNC: 102 MMOL/L
CHOLEST SERPL-MCNC: 179 MG/DL
CHOLEST/HDLC SERPL: 4.7 {RATIO}
CO2 SERPL-SCNC: 24 MMOL/L
CREAT SERPL-MCNC: 0.9 MG/DL
EST. GFR  (AFRICAN AMERICAN): >60 ML/MIN/1.73 M^2
EST. GFR  (NON AFRICAN AMERICAN): >60 ML/MIN/1.73 M^2
ESTIMATED AVG GLUCOSE: 120 MG/DL
GLUCOSE SERPL-MCNC: 113 MG/DL
HBA1C MFR BLD HPLC: 5.8 %
HDLC SERPL-MCNC: 38 MG/DL
HDLC SERPL: 21.2 %
LDLC SERPL CALC-MCNC: 95.8 MG/DL
NONHDLC SERPL-MCNC: 141 MG/DL
POTASSIUM SERPL-SCNC: 4.4 MMOL/L
PROT SERPL-MCNC: 7.6 G/DL
SODIUM SERPL-SCNC: 140 MMOL/L
TRIGL SERPL-MCNC: 226 MG/DL
TSH SERPL DL<=0.005 MIU/L-ACNC: 3.69 UIU/ML

## 2017-09-28 PROCEDURE — 80053 COMPREHEN METABOLIC PANEL: CPT

## 2017-09-28 PROCEDURE — 84443 ASSAY THYROID STIM HORMONE: CPT

## 2017-09-28 PROCEDURE — 80061 LIPID PANEL: CPT

## 2017-09-28 PROCEDURE — 36415 COLL VENOUS BLD VENIPUNCTURE: CPT

## 2017-09-28 PROCEDURE — 83036 HEMOGLOBIN GLYCOSYLATED A1C: CPT

## 2017-10-02 ENCOUNTER — PATIENT OUTREACH (OUTPATIENT)
Dept: ADMINISTRATIVE | Facility: HOSPITAL | Age: 52
End: 2017-10-02

## 2017-10-02 ENCOUNTER — OFFICE VISIT (OUTPATIENT)
Dept: INTERNAL MEDICINE | Facility: CLINIC | Age: 52
End: 2017-10-02
Payer: COMMERCIAL

## 2017-10-02 ENCOUNTER — CLINICAL SUPPORT (OUTPATIENT)
Dept: INTERNAL MEDICINE | Facility: CLINIC | Age: 52
End: 2017-10-02
Payer: COMMERCIAL

## 2017-10-02 VITALS
WEIGHT: 212.5 LBS | BODY MASS INDEX: 30.42 KG/M2 | HEART RATE: 76 BPM | HEIGHT: 70 IN | OXYGEN SATURATION: 98 % | SYSTOLIC BLOOD PRESSURE: 120 MMHG | BODY MASS INDEX: 30.27 KG/M2 | WEIGHT: 211 LBS | DIASTOLIC BLOOD PRESSURE: 78 MMHG

## 2017-10-02 DIAGNOSIS — I10 ESSENTIAL HYPERTENSION: Primary | ICD-10-CM

## 2017-10-02 DIAGNOSIS — E11.9 TYPE 2 DIABETES MELLITUS WITHOUT COMPLICATION, WITHOUT LONG-TERM CURRENT USE OF INSULIN: ICD-10-CM

## 2017-10-02 DIAGNOSIS — E03.9 ACQUIRED HYPOTHYROIDISM: ICD-10-CM

## 2017-10-02 PROCEDURE — 3008F BODY MASS INDEX DOCD: CPT | Mod: S$GLB,,, | Performed by: INTERNAL MEDICINE

## 2017-10-02 PROCEDURE — 3074F SYST BP LT 130 MM HG: CPT | Mod: S$GLB,,, | Performed by: INTERNAL MEDICINE

## 2017-10-02 PROCEDURE — 99214 OFFICE O/P EST MOD 30 MIN: CPT | Mod: S$GLB,,, | Performed by: INTERNAL MEDICINE

## 2017-10-02 PROCEDURE — 3078F DIAST BP <80 MM HG: CPT | Mod: S$GLB,,, | Performed by: INTERNAL MEDICINE

## 2017-10-02 PROCEDURE — 99999 PR PBB SHADOW E&M-EST. PATIENT-LVL I: CPT | Mod: PBBFAC,,,

## 2017-10-02 PROCEDURE — 99999 PR PBB SHADOW E&M-EST. PATIENT-LVL III: CPT | Mod: PBBFAC,,, | Performed by: INTERNAL MEDICINE

## 2017-10-02 NOTE — PROGRESS NOTES
Subjective:       Patient ID: Wily Rodrigues is a 52 y.o. male.    Chief Complaint: Annual Exam    HPI the patient is a 52-year-old male comes in for an annual checkup.  He is junk been doing well.  He is trying to adhere to his diet as recommended by his health .  He is also trying to comply more fruitful with his CPAP device at home.  The patient is not reporting any chest pain or shortness of breath.  He is not had any significant episodes of asthma.  Review of Systems   Constitutional: Negative for appetite change, fatigue and unexpected weight change.   HENT: Negative for congestion and sore throat.    Eyes: Negative for visual disturbance.   Respiratory: Negative for cough, chest tightness, shortness of breath and wheezing.    Cardiovascular: Negative for chest pain and palpitations.   Gastrointestinal: Negative for abdominal distention, abdominal pain, blood in stool, constipation, diarrhea and nausea.   Genitourinary: Negative for difficulty urinating, dysuria, frequency and urgency.   Musculoskeletal: Negative for arthralgias, myalgias and neck stiffness.   Skin: Negative for rash.   Neurological: Negative for dizziness, weakness and headaches.   Psychiatric/Behavioral: Negative for behavioral problems, hallucinations and self-injury.       Objective:      Physical Exam   Constitutional: He is oriented to person, place, and time. He appears well-developed. No distress.   HENT:   Head: Normocephalic.   Mouth/Throat: No oropharyngeal exudate.   Eyes: Conjunctivae and EOM are normal. Pupils are equal, round, and reactive to light. Right eye exhibits no discharge. Left eye exhibits no discharge. No scleral icterus.   Fundi benign bilaterally.   Neck: Normal range of motion. No JVD present. No tracheal deviation present. No thyromegaly present.   Cardiovascular: Normal rate, regular rhythm and normal heart sounds.  Exam reveals no gallop and no friction rub.    No murmur heard.  Pulmonary/Chest: Effort  normal and breath sounds normal. No respiratory distress. He has no wheezes. He has no rales. He exhibits no tenderness.   Abdominal: Soft. Bowel sounds are normal. He exhibits no distension and no mass. There is no tenderness. There is no rebound and no guarding.   Musculoskeletal: Normal range of motion. He exhibits no edema or tenderness.   Lymphadenopathy:     He has no cervical adenopathy.   Neurological: He is alert and oriented to person, place, and time. He has normal reflexes. He displays normal reflexes. No cranial nerve deficit. He exhibits normal muscle tone. Coordination normal.   Skin: Skin is warm and dry. No rash noted. He is not diaphoretic. No erythema. No pallor.   Psychiatric: He has a normal mood and affect. His behavior is normal. Thought content normal.     foot exam: Onychomycosis on the left great toe.  Sensation intact to monofilament line bilaterally.  Good dorsalis pedis pulses bilaterally.  Assessment:       1. Essential hypertension    2. Type 2 diabetes mellitus without complication, without long-term current use of insulin    3. Acquired hypothyroidism        Plan:       1.  The patient's labs were reviewed and are unremarkable.  2.  Patient agrees to proceed beginning colonoscopy  3.  Return to clinic in 6 months

## 2017-10-02 NOTE — PROGRESS NOTES
Health  Follow-Up Note   [x] Office  [] Phone  Notes from previous session reviewed.   [x] Previous Session Goals unchanged.   [] Patient/Caregiver Change in Goals.  Goals added or changed by Patient/Caregiver since program participation:  1.   Continue same plan       Additional/Changed support that patient/caregiver has experienced/sought?  (Indicate readiness, support from family, friends, others, community groups, etc)  1.  friends, volleyball team    Additional/Changed obstacles that could prevent patient/caregiver from reaching their goals?  1.  Heat, humidity  2. Unprepared for meals    Feedback provided:  1.  Praised for continue effort and determination    Diagnostic values/Desriptors for follow-up as needed for chronic condition(s)   Weight: 95.7 kg 210 lb 15.7 oz down 1.54 lbs from last visit total 16 lbs  Labs improved Cholesterol and triglycerides, A1c up slightly from 5.6 to 5.8    Interventions:   1. Health  listened reflectively, validated thoughts and feelings, offered support and encouragement.   2. Allowed patient to express themselves in a non-biased atmosphere.  3. Health  assisted pt to problem-solve obstacles such as being in a challenging environment and dealing with these challenges.   4. Motivational Interviewed interventions utilized (OARS).   5. Patient responded favorably to interventions and remained actively engaged in the session.   6. Health  will remain available and connected for patient by phone and/or office visits.   7. Positive reinforcement, emotional support and encouragement provided.   8. Focused Education: MI, recipes    Plan:  [x] Pt will work on goals as stated above.   [x] Pt will contact Health  for any questions, concerns or needs.  [x] Pt will follow up with Health  in office on   Will call to schedule     [] Pt will follow up with Health  on phone in:        [x] Health  will remain available.   [] Health  will contact  patient by phone in:        [] Health  will consult:        [] Health  will inform Provider via EPIC messaging.     Impression:  1. Behavior is consistent with  Action     Stage of Change.   2. Participation level:       [x] Receptive      [x] Interactive      [] Guarded and Resistant      [x] Self Motivated      [] Refused/Declined to participate   3. [x] Pt voiced understanding of all information presented.       [] Pt voiced needing further information/education. This will be arranged.       [x] Pt would benefit from further education/information as identified by this health . This will be arranged.     Laura Burch RN HC

## 2017-10-03 ENCOUNTER — PATIENT OUTREACH (OUTPATIENT)
Dept: ADMINISTRATIVE | Facility: HOSPITAL | Age: 52
End: 2017-10-03

## 2017-10-18 RX ORDER — LEVOTHYROXINE SODIUM 25 UG/1
TABLET ORAL
Qty: 90 TABLET | Refills: 0 | Status: SHIPPED | OUTPATIENT
Start: 2017-10-18 | End: 2018-02-15 | Stop reason: SDUPTHER

## 2017-10-18 RX ORDER — TRIAMTERENE/HYDROCHLOROTHIAZID 37.5-25 MG
1 TABLET ORAL DAILY
Qty: 90 TABLET | Refills: 0 | Status: SHIPPED | OUTPATIENT
Start: 2017-10-18 | End: 2018-08-07

## 2017-11-17 ENCOUNTER — CLINICAL SUPPORT (OUTPATIENT)
Dept: INTERNAL MEDICINE | Facility: CLINIC | Age: 52
End: 2017-11-17
Payer: COMMERCIAL

## 2017-11-17 VITALS — WEIGHT: 219.56 LBS | BODY MASS INDEX: 31.51 KG/M2

## 2017-11-17 NOTE — PROGRESS NOTES
Health  Follow-Up Note   [x] Office  [] Phone  Notes from previous session reviewed.   [x] Previous Session Goals unchanged.   [] Patient/Caregiver Change in Goals.  Goals added or changed by Patient/Caregiver since program participation:  1. Get back on track  2. Try eating no processed foods      3. Scheduled eye exam     Additional/Changed support that patient/caregiver has experienced/sought?  (Indicate readiness, support from family, friends, others, community groups, etc)  1.  getting new roommate    Additional/Changed obstacles that could prevent patient/caregiver from reaching their goals?  1.  Stress old roommate kicked out  2. Work load in trial    Feedback provided:  1.  Praised for continued effort and determination    Diagnostic values/Desriptors for follow-up as needed for chronic condition(s)   Weight: 99.6 kg 219.58 lb   Blood Glucose: 129 with coffee    Interventions:   1. Health  listened reflectively, validated thoughts and feelings, offered support and encouragement.   2. Allowed patient to express themselves in a non-biased atmosphere.  3. Health  assisted pt to problem-solve obstacles such as being in a challenging environment and dealing with these challenges.   4. Motivational Interviewed interventions utilized (OARS).   5. Patient responded favorably to interventions and remained actively engaged in the session.   6. Health  will remain available and connected for patient by phone and/or office visits.   7. Positive reinforcement, emotional support and encouragement provided.   8. Focused Education: MI    Plan:  [x] Pt will work on goals as stated above.   [x] Pt will contact Health  for any questions, concerns or needs.  [x] Pt will follow up with Health  in office on   12/1/17 at 730.    [] Pt will follow up with Health  on phone in:        [x] Health  will remain available.   [] Health  will contact patient by phone in:        [] Health   will consult:        [] Health  will inform Provider via EPIC messaging.     Impression:  1. Behavior is consistent with   Action   Stage of Change.   2. Participation level:       [x] Receptive      [x] Interactive      [] Guarded and Resistant      [x] Self Motivated      [] Refused/Declined to participate   3. [x] Pt voiced understanding of all information presented.       [] Pt voiced needing further information/education. This will be arranged.       [x] Pt would benefit from further education/information as identified by this health . This will be arranged.     Laura Burch RN HC

## 2017-11-28 DIAGNOSIS — Z12.9 SCREENING FOR CANCER: Primary | ICD-10-CM

## 2017-12-01 ENCOUNTER — CLINICAL SUPPORT (OUTPATIENT)
Dept: INTERNAL MEDICINE | Facility: CLINIC | Age: 52
End: 2017-12-01
Payer: COMMERCIAL

## 2017-12-01 VITALS — BODY MASS INDEX: 30.72 KG/M2 | WEIGHT: 214.06 LBS

## 2017-12-01 NOTE — PROGRESS NOTES
Health  Follow-Up Note   [x] Office  [] Phone  Notes from previous session reviewed.   [x] Previous Session Goals unchanged.   [] Patient/Caregiver Change in Goals.  Goals added or changed by Patient/Caregiver since program participation:  1.  Stay on track    2. Eye exam on 12/15/17  3. Do FOBT  Test Kit given to him today with instructions per Edna     Additional/Changed support that patient/caregiver has experienced/sought?  (Indicate readiness, support from family, friends, others, community groups, etc)  1.  Group of friends doing weight loss contest    Additional/Changed obstacles that could prevent patient/caregiver from reaching their goals?  1.  Holidays    Feedback provided:  1.  Praised for great job down 5.5 lbs from last visit 2 weeks ago    Diagnostic values/Desriptors for follow-up as needed for chronic condition(s)   Weight: 97.1 kg 214.07 lb  A/c 161 just finished oatmeal with granola    Interventions:   1. Health  listened reflectively, validated thoughts and feelings, offered support and encouragement.   2. Allowed patient to express themselves in a non-biased atmosphere.  3. Health  assisted pt to problem-solve obstacles such as being in a challenging environment and dealing with these challenges.   4. Motivational Interviewed interventions utilized (OARS).   5. Patient responded favorably to interventions and remained actively engaged in the session.   6. Health  will remain available and connected for patient by phone and/or office visits.   7. Positive reinforcement, emotional support and encouragement provided.   8. Focused Education: MI, exercise 4 min Dr. Javier WRIGHT lecture    Plan:  [x] Pt will work on goals as stated above.   [x] Pt will contact Health  for any questions, concerns or needs.  [x] Pt will follow up with Health  in office on   12/15/17 at 730.     [] Pt will follow up with Health  on phone in:        [x] Health  will remain  available.   [] Health  will contact patient by phone in:        [] Health  will consult:        [] Health  will inform Provider via EPIC messaging.     Impression:  1. Behavior is consistent with    Action   Stage of Change.   2. Participation level:       [x] Receptive      [x] Interactive      [] Guarded and Resistant      [x] Self Motivated      [] Refused/Declined to participate   3. [x] Pt voiced understanding of all information presented.       [] Pt voiced needing further information/education. This will be arranged.       [x] Pt would benefit from further education/information as identified by this health . This will be arranged.     Laura Burch RN HC

## 2017-12-15 ENCOUNTER — INITIAL CONSULT (OUTPATIENT)
Dept: OPTOMETRY | Facility: CLINIC | Age: 52
End: 2017-12-15
Payer: COMMERCIAL

## 2017-12-15 DIAGNOSIS — E11.9 TYPE 2 DIABETES MELLITUS WITHOUT RETINOPATHY: Primary | ICD-10-CM

## 2017-12-15 DIAGNOSIS — Q14.1 CONGENITAL HYPERTROPHY OF RETINAL PIGMENT EPITHELIUM: ICD-10-CM

## 2017-12-15 DIAGNOSIS — H52.13 MYOPIA WITH ASTIGMATISM AND PRESBYOPIA, BILATERAL: ICD-10-CM

## 2017-12-15 DIAGNOSIS — H52.4 MYOPIA WITH ASTIGMATISM AND PRESBYOPIA, BILATERAL: ICD-10-CM

## 2017-12-15 DIAGNOSIS — H52.203 MYOPIA WITH ASTIGMATISM AND PRESBYOPIA, BILATERAL: ICD-10-CM

## 2017-12-15 DIAGNOSIS — H04.123 BILATERAL DRY EYES: ICD-10-CM

## 2017-12-15 PROCEDURE — 92004 COMPRE OPH EXAM NEW PT 1/>: CPT | Mod: S$GLB,,, | Performed by: OPTOMETRIST

## 2017-12-15 PROCEDURE — 99999 PR PBB SHADOW E&M-EST. PATIENT-LVL II: CPT | Mod: PBBFAC,,, | Performed by: OPTOMETRIST

## 2017-12-15 NOTE — PATIENT INSTRUCTIONS
"DRY EYES     Dry eyes is a common condition. It can be caused by an insufficient volume of tears, or by tears that do not spread evenly over the cornea. An uneven tear film can also cause vision to blur intermittently.   Dry eyes are often associated with burning, stinging, and/or red eyes.As we age, the eyes usually produce fewer tears and result in decreased normal eye lubrication. Paradoxically, dry eye can make eyes water. When they water, that watery production actually makes the dry eye situation worse because the watery tears do not lubricate the eye well at all. Watery tears really serve to flush foreign material out of the eye, not to lubricate. Unfortunately, when the eyes get really dry they become irritated and stimulate the formation of watery tears.   Lubricants, in the form of drops or ointments, are the principal treatment for dry eyes. The following are recommendations for managing your dry eye condition:    1) Use over-the-counter artificial tears or lubricants (such as Systane Balance, Soothe XP, Refresh Optive, Blink, or Genteal), 1 drop in each eye 3 to 4 times a day. Please avoid drops that advertise redness relief since these can be unhealthy for your eyes and can cause permanent redness if used long-term.     It is best to take artificial tears on a schedule, like you would for a medication. Taking them routinely at breakfast, lunch, and dinner for example will provide better relief and better use of the tear drop than taking them whenever your eyes "feel" dry.    2) Optional use of over-the-counter lubricating gels (such as Genteal Gel, Systane Gel, or Refresh PM) applied to the inside of the lower lid of both eyes at bedtime. This gel is very thick and may cause blurred vision, so we recommend you use it before bedtime. In the morning you can gently wipe your eyes with a damp washcloth to remove any residual gel.    3) Warm compresses applied to the closed eyelids twice per day, followed " "with lid massage.    4) Always drink an adequate amount of water daily (4-6 cups a day).    5) 1000 mg of good quality fish oil (labeled DHA and/or EPA). Flaxseed oil can also be beneficial. Do not take fish oil if you are currently taking a medication called warfarin or coumadin.    6) Use a humidifier in your bedroom.    7) If the dryness continues there may be additional options of punctal plugs, or prescription dry eye drops such as Restasis or Xiidra. Punctal plugs are medical devices inserted into the puncta or the drain of the eye to block some of your natural tears from draining off the eye. Restasis and Xiidra are prescription eye drops that, over time, may help your body make more tears naturally.    It is important to maintain this treatment plan until your symptoms have improved. Once improved, you can reduce the frequency of lubricants and warm compresses. If the symptoms recur, then apply as needed for comfort.    ==============================================    Computer Use Tips     1.   Position your computer monitor five to nine inches below the horizontal line of sight so that when you look straight ahead, you look just over the top of the monitor. An upward gaze exposes 40% more of the cornea, which dries out the tear film and compounds the effects of the already dry environment in many office buildings.  2.   Sit far enough away that you can't touch the monitor without leaning forward. A good minimum working distance is about 24 to 26 inches.   3.   If you use a laptop frequently, get a separate monitor and/or keyboard.   4.   Set computer monitor contrast at high.   5.   Turn down overall room lighting and use a task lamp for reading papers.   6.   If you have a window, position the computer so that the window is off to the side, not directly in front of, or behind the desk.   7.   Put a note that says "Blink" next to your computer screen.  Studies show that we spontaneously blink 18 to 22 " times per minute under normal conditions. But during computer use, most likely due to the increased visual attention required, the average blink rate drops to seven per minute, or about one-third the normal rate.  8.   Every 20 minutes, take a 20-second break and look at something at least 20 feet away.     ==============================================    FLASHES AND FLOATERS    You've noticed something new in your field of vision, possibly one or more movable spots or squiggles that you can't remember seeing before. Here are some points to follow and some information about this condition:    Located in the hollow interior of the eye is a transparent gel called the VITREOUS. It is composed of microscopic fibers, large molecules, and fluid-like water.  Most people also have small pieces of material suspended within the vitreous, which under ideal lighting conditions will cast a shadow on the retina and become visible as moving spots, which are called vitreous floaters. Floaters become more common with age.    During life the vitreous fibers condense together and the gel becomes more fluid. Fibrous clumps can form which may become large enough to be seen as floaters. As the fibers condense the liquid portion escapes, allowing the vitreous to shrink, separate from the retina, and collapse forward in the eye. While shrinking, the vitreous gel can pull against the retina causing episodes of light flashes or arcs of light.  The retina does not have pain sensors, and its only response is flashes of light. People often describe these flashes as lightning streaks or fireworks.     A concern is that traction on the retina from the shrinking vitreous may pull a tear in the retina, which could evolve into a retinal detachment.  Therefore persistent flashes do require urgent evaluation. Other symptoms that require urgent evaluation are a large shadow, curtain, or blank spot in your vision, or a sudden shower of dozens or  hundreds of tiny floaters resembling pepper or soot.    To summarize, nearly everyone has floaters, but a sudden shower of dots, persistent light flashes in one eye, or a curtain or shadow in your vision require urgent consultation.  If these occur, please let us know immediately.

## 2017-12-15 NOTE — PROGRESS NOTES
HPI     Mr. Wily Rodrigues is here for a diabetic eye exam, as recommended by   his [facility]  (Laura Burch RN).    Patient states no complaints about eyes, vision, or glasses today. Patient   declines refraction; he has had a refraction within the last 12 months.     He reports dry eyes. He spends many hours per day on the computer.     (-)drops  (-)flashes  (-)floaters  (-)diplopia    Diabetic yes   Controlled without medication.   Hemoglobin A1C       Date                     Value               Ref Range             Status           09/28/2017               5.8 (H)             4.0 - 5.6 %         Final  02/24/2017               5.6                 4.5 - 6.2 %         Final  07/06/2016               5.7                 4.5 - 6.2 %         Final    OCULAR HISTORY (pt-reported)  Last Eye Exam within the last 12 months at [facility]   (-)eye surgery   High myopia     FAMILY HISTORY  (-)Glaucoma none       Last edited by Ariadna Barger, OD on 12/15/2017  9:02 AM. (History)            Assessment /Plan     For exam results, see Encounter Report.    Type 2 diabetes mellitus without retinopathy   No retinopathy noted OU. Continue management of DM as directed by PCP. Monitor with DFE in 1 year, or RTC immediately with any vision changes.     Congenital hypertrophy of retinal pigment epithelium   Left eye. Isolated. Monitor.    Myopia with astigmatism and presbyopia, bilateral   High myopia OU. Reviewed signs and symptoms of a retinal detachment, pt understands to return to clinic immediately with any new floaters, flashes of light, or a veil over vision.  Monitor with yearly DFE.    Bilateral dry eyes   Symptomatic. Recommended artificial tears prn (up to QID OU) and frequent breaks during sustained near tasks.        RTC 1 year (return care to [facility], or diabetic eye screening photo at [facility] okay)

## 2017-12-21 ENCOUNTER — LAB VISIT (OUTPATIENT)
Dept: LAB | Facility: HOSPITAL | Age: 52
End: 2017-12-21
Attending: INTERNAL MEDICINE
Payer: COMMERCIAL

## 2017-12-21 ENCOUNTER — CLINICAL SUPPORT (OUTPATIENT)
Dept: INTERNAL MEDICINE | Facility: CLINIC | Age: 52
End: 2017-12-21
Payer: COMMERCIAL

## 2017-12-21 VITALS — BODY MASS INDEX: 30.81 KG/M2 | WEIGHT: 214.75 LBS

## 2017-12-21 DIAGNOSIS — Z12.9 SCREENING FOR CANCER: ICD-10-CM

## 2017-12-21 LAB — HEMOCCULT STL QL IA: NEGATIVE

## 2017-12-21 PROCEDURE — 82274 ASSAY TEST FOR BLOOD FECAL: CPT

## 2017-12-21 NOTE — PROGRESS NOTES
Health  Follow-Up Note   [x] Office  [] Phone  Notes from previous session reviewed.   [x] Previous Session Goals unchanged.   [] Patient/Caregiver Change in Goals.  Goals added or changed by Patient/Caregiver since program participation:  1.  Continue same plan  2. Start work out with buddy after first of year and holidays        Additional/Changed support that patient/caregiver has experienced/sought?  (Indicate readiness, support from family, friends, others, community groups, etc)  1.  Buddy    Additional/Changed obstacles that could prevent patient/caregiver from reaching their goals?  1.  Holidays    Feedback provided:  1.  Praised for great job less then 1lb gain     Diagnostic values/Desriptors for follow-up as needed for chronic condition(s)   Weight: 97.4 kg 214.73 lb  Blood Glucose: 142 fasting this am    Interventions:   1. Health  listened reflectively, validated thoughts and feelings, offered support and encouragement.   2. Allowed patient to express themselves in a non-biased atmosphere.  3. Health  assisted pt to problem-solve obstacles such as being in a challenging environment and dealing with these challenges.   4. Motivational Interviewed interventions utilized (OARS).   5. Patient responded favorably to interventions and remained actively engaged in the session.   6. Health  will remain available and connected for patient by phone and/or office visits.   7. Positive reinforcement, emotional support and encouragement provided.   8. Focused Education: MI, healthy options    Plan:  [x] Pt will work on goals as stated above.   [x] Pt will contact Health  for any questions, concerns or needs.  [x] Pt will follow up with Health  in office on    1/4/18 at 730.    [] Pt will follow up with Health  on phone in:        [x] Health  will remain available.   [] Health  will contact patient by phone in:        [] Health  will consult:        [] Health   will inform Provider via EPIC messaging.     Impression:  1. Behavior is consistent with   Action     Stage of Change.   2. Participation level:       [x] Receptive      [x] Interactive      [] Guarded and Resistant      [x] Self Motivated      [] Refused/Declined to participate   3. [x] Pt voiced understanding of all information presented.       [] Pt voiced needing further information/education. This will be arranged.       [x] Pt would benefit from further education/information as identified by this health . This will be arranged.     Laura Burch RN HC

## 2017-12-27 ENCOUNTER — PATIENT MESSAGE (OUTPATIENT)
Dept: INTERNAL MEDICINE | Facility: CLINIC | Age: 52
End: 2017-12-27

## 2018-01-04 ENCOUNTER — CLINICAL SUPPORT (OUTPATIENT)
Dept: INTERNAL MEDICINE | Facility: CLINIC | Age: 53
End: 2018-01-04
Payer: COMMERCIAL

## 2018-01-04 VITALS — BODY MASS INDEX: 31.13 KG/M2 | WEIGHT: 216.94 LBS

## 2018-01-04 NOTE — PROGRESS NOTES
Health  Follow-Up Note   [x] Office  [] Phone  Notes from previous session reviewed.   [x] Previous Session Goals unchanged.   [] Patient/Caregiver Change in Goals.  Goals added or changed by Patient/Caregiver since program participation:  1.     Get back on track      Additional/Changed support that patient/caregiver has experienced/sought?  (Indicate readiness, support from family, friends, others, community groups, etc)  1.  new room mate helps somewhat, coworkers, friends    Additional/Changed obstacles that could prevent patient/caregiver from reaching their goals?  1.   Holidays    Feedback provided:  1.  Praised for continued effort    Diagnostic values/Desriptors for follow-up as needed for chronic condition(s)   Weight: 98.4 kg 216 gain 2.2 lbs  Blood Glucose: 130 fasting    Interventions:   1. Health  listened reflectively, validated thoughts and feelings, offered support and encouragement.   2. Allowed patient to express themselves in a non-biased atmosphere.  3. Health  assisted pt to problem-solve obstacles such as being in a challenging environment and dealing with these challenges.   4. Motivational Interviewed interventions utilized (OARS).   5. Patient responded favorably to interventions and remained actively engaged in the session.   6. Health  will remain available and connected for patient by phone and/or office visits.   7. Positive reinforcement, emotional support and encouragement provided.   8. Focused Education: MI, lectins    Plan:  [x] Pt will work on goals as stated above.   [x] Pt will contact Health  for any questions, concerns or needs.  [x] Pt will follow up with Health  in office on   1/19/18 at 730     [] Pt will follow up with Health  on phone in:        [x] Health  will remain available.   [] Health  will contact patient by phone in:        [] Health  will consult:        [] Health  will inform Provider via EPIC messaging.      Impression:  1. Behavior is consistent with   Action    Stage of Change.   2. Participation level:       [x] Receptive      [x] Interactive      [] Guarded and Resistant      [x] Self Motivated      [] Refused/Declined to participate   3. [x] Pt voiced understanding of all information presented.       [] Pt voiced needing further information/education. This will be arranged.       [x] Pt would benefit from further education/information as identified by this health . This will be arranged.     Laura Burch RN HC

## 2018-02-15 RX ORDER — LISINOPRIL 40 MG/1
40 TABLET ORAL DAILY
Qty: 90 TABLET | Refills: 3 | Status: SHIPPED | OUTPATIENT
Start: 2018-02-15 | End: 2019-03-25 | Stop reason: SDUPTHER

## 2018-02-15 RX ORDER — LEVOTHYROXINE SODIUM 25 UG/1
25 TABLET ORAL
Qty: 90 TABLET | Refills: 0 | Status: SHIPPED | OUTPATIENT
Start: 2018-02-15 | End: 2018-05-27 | Stop reason: SDUPTHER

## 2018-02-23 ENCOUNTER — CLINICAL SUPPORT (OUTPATIENT)
Dept: INTERNAL MEDICINE | Facility: CLINIC | Age: 53
End: 2018-02-23
Payer: COMMERCIAL

## 2018-02-23 VITALS
DIASTOLIC BLOOD PRESSURE: 78 MMHG | SYSTOLIC BLOOD PRESSURE: 118 MMHG | BODY MASS INDEX: 31.03 KG/M2 | WEIGHT: 216.25 LBS

## 2018-02-23 PROCEDURE — 99999 PR PBB SHADOW E&M-EST. PATIENT-LVL I: CPT | Mod: PBBFAC,,,

## 2018-02-23 NOTE — PROGRESS NOTES
Health  Follow-Up Note   [x] Office  [] Phone  Notes from previous session reviewed.   [x] Previous Session Goals unchanged.   [] Patient/Caregiver Change in Goals.  Goals added or changed by Patient/Caregiver since program participation:  1.     Get back on track     Additional/Changed support that patient/caregiver has experienced/sought?  (Indicate readiness, support from family, friends, others, community groups, etc)  1.  friends    Additional/Changed obstacles that could prevent patient/caregiver from reaching their goals?  1.  Birthday and [employer]    Feedback provided:  1.   Praised for continued effort and determination    Diagnostic values/Desriptors for follow-up as needed for chronic condition(s)   Weight: 98.1kg 216.27 down 0.66 lbs since last visit 1/4/18  Blood Glucose: 115 in office after oatmeal    Interventions:   1. Health  listened reflectively, validated thoughts and feelings, offered support and encouragement.   2. Allowed patient to express themselves in a non-biased atmosphere.  3. Health  assisted pt to problem-solve obstacles such as being in a challenging environment and dealing with these challenges.   4. Motivational Interviewed interventions utilized (OARS).   5. Patient responded favorably to interventions and remained actively engaged in the session.   6. Health  will remain available and connected for patient by phone and/or office visits.   7. Positive reinforcement, emotional support and encouragement provided.   8. Focused Education: MI    Plan:  [x] Pt will work on goals as stated above.   [x] Pt will contact Health  for any questions, concerns or needs.  [x] Pt will follow up with Health  in office on  3/16/18 at 730.      [] Pt will follow up with Health  on phone in:        [x] Health  will remain available.   [] Health  will contact patient by phone in:        [] Health  will consult:        [] Health  will inform  Provider via EPIC messaging.     Impression:  1. Behavior is consistent with   Action    Stage of Change.   2. Participation level:       [x] Receptive      [x] Interactive      [] Guarded and Resistant      [x] Self Motivated      [] Refused/Declined to participate   3. [x] Pt voiced understanding of all information presented.       [] Pt voiced needing further information/education. This will be arranged.       [x] Pt would benefit from further education/information as identified by this health . This will be arranged.     Laura Burch RN HC

## 2018-03-01 ENCOUNTER — TELEPHONE (OUTPATIENT)
Dept: ENDOSCOPY | Facility: HOSPITAL | Age: 53
End: 2018-03-01

## 2018-03-23 ENCOUNTER — PATIENT MESSAGE (OUTPATIENT)
Dept: ADMINISTRATIVE | Facility: HOSPITAL | Age: 53
End: 2018-03-23

## 2018-03-23 ENCOUNTER — PATIENT MESSAGE (OUTPATIENT)
Dept: SLEEP MEDICINE | Facility: CLINIC | Age: 53
End: 2018-03-23

## 2018-03-23 ENCOUNTER — TELEPHONE (OUTPATIENT)
Dept: INTERNAL MEDICINE | Facility: CLINIC | Age: 53
End: 2018-03-23

## 2018-03-23 NOTE — TELEPHONE ENCOUNTER
----- Message from Samreen Torres sent at 3/23/2018  9:58 AM CDT -----  Contact: Live[employer] message  Appointment Request From: Wily Rodrigues    With Provider: Eliseo Rogers Jr, MD [-Primary Care Physician-]    Would Accept With:Only the person I've selected    Preferred Date Range: From 3/23/2018 To 4/30/2018    Preferred Times: Any    Reason for visit: Request an Appt    Comments:  If anything opens up for Dr. Rogers before he retires in April, please book the appointment for me and I will be there.     Would like a final followup if possible, as he has been primary care for 25+ years.     Thank you,   Wily cell/text [phone]

## 2018-04-02 ENCOUNTER — OFFICE VISIT (OUTPATIENT)
Dept: INTERNAL MEDICINE | Facility: CLINIC | Age: 53
End: 2018-04-02
Payer: COMMERCIAL

## 2018-04-02 ENCOUNTER — CLINICAL SUPPORT (OUTPATIENT)
Dept: INTERNAL MEDICINE | Facility: CLINIC | Age: 53
End: 2018-04-02
Payer: COMMERCIAL

## 2018-04-02 ENCOUNTER — PATIENT MESSAGE (OUTPATIENT)
Dept: SLEEP MEDICINE | Facility: CLINIC | Age: 53
End: 2018-04-02

## 2018-04-02 VITALS
SYSTOLIC BLOOD PRESSURE: 124 MMHG | DIASTOLIC BLOOD PRESSURE: 78 MMHG | WEIGHT: 219.13 LBS | BODY MASS INDEX: 31.44 KG/M2

## 2018-04-02 DIAGNOSIS — I10 ESSENTIAL HYPERTENSION: Primary | ICD-10-CM

## 2018-04-02 DIAGNOSIS — E11.9 TYPE 2 DIABETES MELLITUS WITHOUT COMPLICATION, WITHOUT LONG-TERM CURRENT USE OF INSULIN: ICD-10-CM

## 2018-04-02 PROCEDURE — 3044F HG A1C LEVEL LT 7.0%: CPT | Mod: CPTII,S$GLB,, | Performed by: INTERNAL MEDICINE

## 2018-04-02 PROCEDURE — 99213 OFFICE O/P EST LOW 20 MIN: CPT | Mod: S$GLB,,, | Performed by: INTERNAL MEDICINE

## 2018-04-02 PROCEDURE — 99999 PR PBB SHADOW E&M-EST. PATIENT-LVL I: CPT | Mod: PBBFAC,,,

## 2018-04-02 PROCEDURE — 3078F DIAST BP <80 MM HG: CPT | Mod: CPTII,S$GLB,, | Performed by: INTERNAL MEDICINE

## 2018-04-02 PROCEDURE — 99999 PR PBB SHADOW E&M-EST. PATIENT-LVL III: CPT | Mod: PBBFAC,,, | Performed by: INTERNAL MEDICINE

## 2018-04-02 PROCEDURE — 3074F SYST BP LT 130 MM HG: CPT | Mod: CPTII,S$GLB,, | Performed by: INTERNAL MEDICINE

## 2018-04-02 RX ORDER — ALBUTEROL SULFATE 90 UG/1
2 AEROSOL, METERED RESPIRATORY (INHALATION) EVERY 6 HOURS PRN
Qty: 18 G | Refills: 11 | Status: SHIPPED | OUTPATIENT
Start: 2018-04-02 | End: 2020-04-24 | Stop reason: SDUPTHER

## 2018-04-02 NOTE — PROGRESS NOTES
Health  Follow-Up Note   [x] Office  [] Phone  Notes from previous session reviewed.   [x] Previous Session Goals unchanged.   [] Patient/Caregiver Change in Goals.  Goals added or changed by Patient/Caregiver since program participation:  1.  Continue same plan     2. Ran the [employer] Game Saturday without stopping  3. Labs on Friday     Additional/Changed support that patient/caregiver has experienced/sought?  (Indicate readiness, support from family, friends, others, community groups, etc)  1.  Friends    Feedback provided:  1.  Praised for continued effort and determination    Diagnostic values/Desriptors for follow-up as needed for chronic condition(s)   Weight: 99.4 kg  /78    Interventions:   1. Health  listened reflectively, validated thoughts and feelings, offered support and encouragement.   2. Allowed patient to express themselves in a non-biased atmosphere.  3. Health  assisted pt to problem-solve obstacles such as being in a challenging environment and dealing with these challenges.   4. Motivational Interviewed interventions utilized (OARS).   5. Patient responded favorably to interventions and remained actively engaged in the session.   6. Health  will remain available and connected for patient by phone and/or office visits.   7. Positive reinforcement, emotional support and encouragement provided.   8. Focused Education: MI    Plan:  [x] Pt will work on goals as stated above.   [x] Pt will contact Health  for any questions, concerns or needs.  [x] Pt will follow up with Health  in office on    4/13/18 at 730.    [] Pt will follow up with Health  on phone in:        [x] Health  will remain available.   [] Health  will contact patient by phone in:        [] Health  will consult:        [] Health  will inform Provider via EPIC messaging.     Impression:  1. Behavior is consistent with    Action   Stage of Change.   2. Participation  level:       [x] Receptive      [x] Interactive      [] Guarded and Resistant      [x] Self Motivated      [] Refused/Declined to participate   3. [x] Pt voiced understanding of all information presented.       [] Pt voiced needing further information/education. This will be arranged.       [x] Pt would benefit from further education/information as identified by this health . This will be arranged.     Laura Burch RN HC

## 2018-04-03 ENCOUNTER — PATIENT MESSAGE (OUTPATIENT)
Dept: INTERNAL MEDICINE | Facility: CLINIC | Age: 53
End: 2018-04-03

## 2018-04-03 VITALS
DIASTOLIC BLOOD PRESSURE: 64 MMHG | HEART RATE: 80 BPM | SYSTOLIC BLOOD PRESSURE: 128 MMHG | WEIGHT: 218.94 LBS | BODY MASS INDEX: 31.34 KG/M2 | HEIGHT: 70 IN

## 2018-04-03 NOTE — PROGRESS NOTES
Subjective:       Patient ID: Wily Rodrigues is a 53 y.o. male.    Chief Complaint: Follow-up    HPI the patient is a 53-year-old male comes in for follow-up of his diabetes and hypertension.  He is trying to adhere closely to his diet.  He is staying fairly active.  He attends on commanding even further to healthy lifestyles.  He is not noticing any chest pain or shortness of breath.  He will be seen the nutritionist again shortly.  Review of Systems   Constitutional: Negative.  Negative for activity change and unexpected weight change.   HENT: Negative for hearing loss, rhinorrhea and trouble swallowing.    Eyes: Negative for discharge and visual disturbance.   Respiratory: Negative for chest tightness, shortness of breath and wheezing.    Cardiovascular: Negative for chest pain and palpitations.   Gastrointestinal: Negative for blood in stool, constipation, diarrhea and vomiting.   Endocrine: Negative for polydipsia and polyuria.   Genitourinary: Negative for difficulty urinating, hematuria and urgency.   Musculoskeletal: Negative for arthralgias, joint swelling and neck pain.   Neurological: Negative for weakness and headaches.   Psychiatric/Behavioral: Negative for confusion and dysphoric mood.       Objective:      Physical Exam   Constitutional: He appears well-developed and well-nourished. No distress.   Cardiovascular: Normal rate, regular rhythm and normal heart sounds.  Exam reveals no gallop and no friction rub.    No murmur heard.  Pulmonary/Chest: Effort normal and breath sounds normal. No respiratory distress. He has no wheezes. He has no rales.   Skin: He is not diaphoretic.   Vitals reviewed.      Assessment:       1. Essential hypertension    2. Type 2 diabetes mellitus without complication, without long-term current use of insulin      blood pressure under good control this time.  Plan:        1.  Fasting blood sugar, hemoglobin A1c  2.  Refill medications  3.  Return to clinic in 6 months

## 2018-04-06 ENCOUNTER — PATIENT MESSAGE (OUTPATIENT)
Dept: ADMINISTRATIVE | Facility: HOSPITAL | Age: 53
End: 2018-04-06

## 2018-04-08 ENCOUNTER — PATIENT MESSAGE (OUTPATIENT)
Dept: ADMINISTRATIVE | Facility: OTHER | Age: 53
End: 2018-04-08

## 2018-04-10 ENCOUNTER — PATIENT MESSAGE (OUTPATIENT)
Dept: INTERNAL MEDICINE | Facility: CLINIC | Age: 53
End: 2018-04-10

## 2018-04-10 ENCOUNTER — LAB VISIT (OUTPATIENT)
Dept: LAB | Facility: HOSPITAL | Age: 53
End: 2018-04-10
Attending: INTERNAL MEDICINE
Payer: COMMERCIAL

## 2018-04-10 ENCOUNTER — OFFICE VISIT (OUTPATIENT)
Dept: SLEEP MEDICINE | Facility: CLINIC | Age: 53
End: 2018-04-10
Payer: COMMERCIAL

## 2018-04-10 VITALS
DIASTOLIC BLOOD PRESSURE: 70 MMHG | BODY MASS INDEX: 31.02 KG/M2 | HEIGHT: 70 IN | WEIGHT: 216.69 LBS | SYSTOLIC BLOOD PRESSURE: 112 MMHG | HEART RATE: 72 BPM

## 2018-04-10 DIAGNOSIS — E11.9 TYPE 2 DIABETES MELLITUS WITHOUT COMPLICATION, WITHOUT LONG-TERM CURRENT USE OF INSULIN: ICD-10-CM

## 2018-04-10 DIAGNOSIS — I10 ESSENTIAL HYPERTENSION: ICD-10-CM

## 2018-04-10 DIAGNOSIS — G47.33 OBSTRUCTIVE SLEEP APNEA: Primary | ICD-10-CM

## 2018-04-10 DIAGNOSIS — G47.00 INSOMNIA, UNSPECIFIED TYPE: ICD-10-CM

## 2018-04-10 LAB
ESTIMATED AVG GLUCOSE: 114 MG/DL
GLUCOSE SERPL-MCNC: 117 MG/DL
HBA1C MFR BLD HPLC: 5.6 %

## 2018-04-10 PROCEDURE — 3044F HG A1C LEVEL LT 7.0%: CPT | Mod: CPTII,S$GLB,, | Performed by: NURSE PRACTITIONER

## 2018-04-10 PROCEDURE — 83036 HEMOGLOBIN GLYCOSYLATED A1C: CPT

## 2018-04-10 PROCEDURE — 99999 PR PBB SHADOW E&M-EST. PATIENT-LVL IV: CPT | Mod: PBBFAC,,, | Performed by: NURSE PRACTITIONER

## 2018-04-10 PROCEDURE — 99214 OFFICE O/P EST MOD 30 MIN: CPT | Mod: S$GLB,,, | Performed by: NURSE PRACTITIONER

## 2018-04-10 PROCEDURE — 82947 ASSAY GLUCOSE BLOOD QUANT: CPT

## 2018-04-10 PROCEDURE — 3078F DIAST BP <80 MM HG: CPT | Mod: CPTII,S$GLB,, | Performed by: NURSE PRACTITIONER

## 2018-04-10 PROCEDURE — 36415 COLL VENOUS BLD VENIPUNCTURE: CPT

## 2018-04-10 PROCEDURE — 3074F SYST BP LT 130 MM HG: CPT | Mod: CPTII,S$GLB,, | Performed by: NURSE PRACTITIONER

## 2018-04-10 NOTE — PROGRESS NOTES
"Wily Rodrigues  was seen as a f/u for the management of obstructive sleep apnea. Last seen 2/16/17     Since last seen 2017,he got setup apap 10-20cm. "time for my annual non-compliance visit". No machine today. Using dreamwear mask. When used, notices a big difference , sleepiness is better. Was easier to get up in am. Snoring resolved. Just got new supplies from [facility]. Falls asleep w/o mask a lot, tv on. Seeing health  still. Using sleep apnea pillow from [facility] has helped. Wants med to help sleep onset. ESS=9. +anixety/tense in shoulers, needs to relax more.    HgBA1c 5.6 (today)    Interrogation- no machine.     HISTORY:  1/14/16:  CHIEF COMPLAINT: CPAP use    HISTORY OF PRESENT ILLNESS:Wily Rodrigues a 53 y.o.  male presents for the management of obstructive sleep apnea. This is his initial visit in Sleep Clinic. Previously seen by Dr. Lee in different dept 2009.  He was diagnosed with AJ previously and has been using it inconsistently of late. Split-night study in 2009 revealed effective pressure of 22/20cm which was intolerable to him. A bipap titration study in 2012 (221#) revealed needed pressures up to 20/16cm to eliminate snoring. Mouth leak and arousals could have contributed to suboptimal pressures. He was switched to autobipap therapy 16/14cm. He has used it inconsistently. He is motivated now to begin using it better. He is seeing a health  (turns 51 next month). He uses a nasal mask. He has resumed using it recently for a short time. Without therapy, he reports snoring. He report symptoms of disruptive snoring, un-refreshing sleep, and excessive daytime sleepiness.     Denies symptoms of restless legs or kicking during sleep.     On todays [place] Sleepiness Scale the patient scores a 13/24.     Interrogation: old remstar machine fair condition, manometer 12cm. Heat at 4. Total therapy hrs 1517h. 30d avg 0:36h/n    PSG 3/27/09: (207#) RDI 9.3 (REM RDI 22.3)/low sat 76%--? 22/20cm " "bipap  Titration study in epic    BT:11p, 12a off day  SL: 30-60min  Disruptions: nocturia 1-2x  Sleep quality: unrefreshing     FAMILY HISTORY: No known sleep disorders. Parents snore  SOCIAL HISTORY: Rare etoh    REVIEW OF SYSTEMS:  Sleep related symptoms as per HPI; 1# gain,  occasional sinus congestion; focus better (focalin qd).   Otherwise, a balance of 10 systems reviewed is negative        PHYSICAL EXAM:   /70   Pulse 72   Ht 5' 10" (1.778 m)   Wt 98.3 kg (216 lb 11.4 oz)   BMI 31.09 kg/m²   GENERAL: W/D,obese body habitus, well groomed         ASSESSMENT:     Obstructive sleep apnea (AJ), mild,previously intolerable to bipap high pressure settings. 2009 switched to autobipap with persistent use of therapy, but limited. Mouth leaks potential contributing factor. Ready to resume consistent use of PAP therapy for improvement of snoring, EDS, disrupted sleep. Needs new supplies/eligible for new machine. 2/16/17: Had titration study and PAP fixed effective for side sleep, unknown for supine REM. Apap setup planned today 4/10/18: adherent with apap, but limited recently, needs regular monitoring to stay with use/habit. Sleep onset issues/anxiety affecting use and poor sleep habits  He has medical comorbidities of obesity, hypertension, DM2 (well controlled), hypothyroidism.  Insomnia  Anxiety    PLAN:   1. APAP 10-20cm continue to improve mask on time/use. RTC 5 wks monitoring.  Supplies via [facility]   2. Discussed health benefits of therapy and potential ramifications of untreated AJ, including stroke, heart disease, HTN.    3. The patient was advised to abstain from driving should he feel sleepy or drowsy.   4. Encouraged continued weight loss efforts for potential improvement of AJ and overall health benefits, working with health  regularly.   5. Discussed importance of relaxation activities evening, avoid tv watching, begin regular sleep-wake schedule. Consider melatonin 5-10mg qhs to help " onset/if unimproved will begin RX medication  6. Continue to see PCP re: HTN mgt and DM2

## 2018-04-13 ENCOUNTER — CLINICAL SUPPORT (OUTPATIENT)
Dept: INTERNAL MEDICINE | Facility: CLINIC | Age: 53
End: 2018-04-13
Payer: COMMERCIAL

## 2018-04-13 VITALS — WEIGHT: 216.5 LBS | BODY MASS INDEX: 31.06 KG/M2

## 2018-04-13 NOTE — PROGRESS NOTES
Health  Follow-Up Note   [x] Office  [] Phone  Notes from previous session reviewed.   [x] Previous Session Goals unchanged.   [] Patient/Caregiver Change in Goals.  Goals added or changed by Patient/Caregiver since program participation:  1.     Continue same plan     Additional/Changed support that patient/caregiver has experienced/sought?  (Indicate readiness, support from family, friends, others, community groups, etc)  1.  Friends    Additional/Changed obstacles that could prevent patient/caregiver from reaching their goals?  1.  Festivals  2. Not preparing in advance    Feedback provided:  1.  Praised for great job and A1c 5.6  And down 3.65 lbs    Diagnostic values/Desriptors for follow-up as needed for chronic condition(s)   Weight: 98.2 kg 216.49  A1c 5.6 on  4/10/18 down from 5.8    Interventions:   1. Health  listened reflectively, validated thoughts and feelings, offered support and encouragement.   2. Allowed patient to express themselves in a non-biased atmosphere.  3. Health  assisted pt to problem-solve obstacles such as being in a challenging environment and dealing with these challenges.   4. Motivational Interviewed interventions utilized (OARS).   5. Patient responded favorably to interventions and remained actively engaged in the session.   6. Health  will remain available and connected for patient by phone and/or office visits.   7. Positive reinforcement, emotional support and encouragement provided.   8. Focused Education: MI    Plan:  [x] Pt will work on goals as stated above.   [x] Pt will contact Health  for any questions, concerns or needs.  [x] Pt will follow up with Health  in office will call to schedule.    [] Pt will follow up with Health  on phone in:        [x] Health  will remain available.   [] Health  will contact patient by phone in:        [] Health  will consult:        [] Health  will inform Provider via EPIC messaging.      Impression:  1. Behavior is consistent with  Action     Stage of Change.   2. Participation level:       [x] Receptive      [x] Interactive      [] Guarded and Resistant      [x] Self Motivated      [] Refused/Declined to participate   3. [x] Pt voiced understanding of all information presented.       [] Pt voiced needing further information/education. This will be arranged.       [x] Pt would benefit from further education/information as identified by this health . This will be arranged.     Laura Burch RN HC

## 2018-05-28 RX ORDER — LEVOTHYROXINE SODIUM 25 UG/1
25 TABLET ORAL
Qty: 90 TABLET | Refills: 0 | Status: SHIPPED | OUTPATIENT
Start: 2018-05-28 | End: 2018-08-28 | Stop reason: SDUPTHER

## 2018-06-27 ENCOUNTER — CLINICAL SUPPORT (OUTPATIENT)
Dept: INTERNAL MEDICINE | Facility: CLINIC | Age: 53
End: 2018-06-27
Payer: COMMERCIAL

## 2018-06-27 VITALS — BODY MASS INDEX: 31.25 KG/M2 | WEIGHT: 217.81 LBS

## 2018-06-27 NOTE — PROGRESS NOTES
Health  Follow-Up Note   [x] Office  [] Phone  Notes from previous session reviewed.   [x] Previous Session Goals unchanged.   [] Patient/Caregiver Change in Goals.  Goals added or changed by Patient/Caregiver since program participation:  1.  Get back on track   2. Cut sugar  3. Increase good Omega 3 fats        Additional/Changed support that patient/caregiver has experienced/sought?  (Indicate readiness, support from family, friends, others, community groups, etc)  1.  Friends - [place]    Additional/Changed obstacles that could prevent patient/caregiver from reaching their goals?  1.  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  Praised for good job and determination    Diagnostic values/Desriptors for follow-up as needed for chronic condition(s)   Weight: 98.8 kg 217.81 lb gain 1.32 lb  Blood Glucose: 125 in office today  /86 after cup of coffee    Interventions:   1. Health  listened reflectively, validated thoughts and feelings, offered support and encouragement.   2. Allowed patient to express themselves in a non-biased atmosphere.  3. Health  assisted pt to problem-solve obstacles such as being in a challenging environment and dealing with these challenges.   4. Motivational Interviewed interventions utilized (OARS).   5. Patient responded favorably to interventions and remained actively engaged in the session.   6. Health  will remain available and connected for patient by phone and/or office visits.   7. Positive reinforcement, emotional support and encouragement provided.   8. Focused Education: MI, metabolic syndrome    Plan:  [x] Pt will work on goals as stated above.   [x] Pt will contact Health  for any questions, concerns or needs.  [x] Pt will follow up with Health  in office on  7/25/18 at 0700.      [] Pt will follow up with Health  on phone in:        [x] Health  will remain available.   [] Health  will contact patient by  phone in:        [] Health  will consult:        [] Health  will inform Provider via EPIC messaging.     Impression:  1. Behavior is consistent with     Action  Stage of Change.   2. Participation level:       [x] Receptive      [x] Interactive      [] Guarded and Resistant      [x] Self Motivated      [] Refused/Declined to participate   3. [x] Pt voiced understanding of all information presented.       [] Pt voiced needing further information/education. This will be arranged.       [x] Pt would benefit from further education/information as identified by this health . This will be arranged.     Laura Burch RN HC

## 2018-07-25 ENCOUNTER — CLINICAL SUPPORT (OUTPATIENT)
Dept: INTERNAL MEDICINE | Facility: CLINIC | Age: 53
End: 2018-07-25
Payer: COMMERCIAL

## 2018-07-25 VITALS — BODY MASS INDEX: 31.41 KG/M2 | WEIGHT: 218.94 LBS

## 2018-07-25 NOTE — PROGRESS NOTES
Health  Follow-Up Note   [x] Office  [] Phone  Notes from previous session reviewed.   [x] Previous Session Goals unchanged.   [] Patient/Caregiver Change in Goals.  Goals added or changed by Patient/Caregiver since program participation:  1.     Continue same plan  2. Increase veggies or replace smoothie for meal     Additional/Changed support that patient/caregiver has experienced/sought?  (Indicate readiness, support from family, friends, others, community groups, etc)  1.  Friends    Additional/Changed obstacles that could prevent patient/caregiver from reaching their goals?  1.  not able to exercise as much do to heat    Feedback provided:  1.  Praised for continued effort and determination    Diagnostic values/Desriptors for follow-up as needed for chronic condition(s)   Weight: 99.3 kg 218.92 gain 1.1 lb  Blood Glucose: 141 after coffee with cream  Pain: hurt knee playing volleyball    Interventions:   1. Health  listened reflectively, validated thoughts and feelings, offered support and encouragement.   2. Allowed patient to express themselves in a non-biased atmosphere.  3. Health  assisted pt to problem-solve obstacles such as being in a challenging environment and dealing with these challenges.   4. Motivational Interviewed interventions utilized (OARS).   5. Patient responded favorably to interventions and remained actively engaged in the session.   6. Health  will remain available and connected for patient by phone and/or office visits.   7. Positive reinforcement, emotional support and encouragement provided.   8. Focused Education: MI    Plan:  [x] Pt will work on goals as stated above.   [x] Pt will contact Health  for any questions, concerns or needs.  [x] Pt will follow up with Health  in office on    8/22/18 at 700.    [] Pt will follow up with Health  on phone in:        [x] Health  will remain available.   [] Health  will contact patient by phone in:         [] Health  will consult:        [] Health  will inform Provider via EPIC messaging.     Impression:  1. Behavior is consistent with  Action     Stage of Change.   2. Participation level:       [x] Receptive      [x] Interactive      [] Guarded and Resistant      [x] Self Motivated      [] Refused/Declined to participate   3. [x] Pt voiced understanding of all information presented.       [] Pt voiced needing further information/education. This will be arranged.       [x] Pt would benefit from further education/information as identified by this health . This will be arranged.     Laura Burch RN HC

## 2018-08-07 ENCOUNTER — LAB VISIT (OUTPATIENT)
Dept: LAB | Facility: HOSPITAL | Age: 53
End: 2018-08-07
Attending: FAMILY MEDICINE
Payer: COMMERCIAL

## 2018-08-07 ENCOUNTER — OFFICE VISIT (OUTPATIENT)
Dept: FAMILY MEDICINE | Facility: CLINIC | Age: 53
End: 2018-08-07
Attending: FAMILY MEDICINE
Payer: COMMERCIAL

## 2018-08-07 VITALS
WEIGHT: 221.69 LBS | SYSTOLIC BLOOD PRESSURE: 150 MMHG | OXYGEN SATURATION: 98 % | DIASTOLIC BLOOD PRESSURE: 92 MMHG | HEIGHT: 70 IN | HEART RATE: 55 BPM | BODY MASS INDEX: 31.74 KG/M2

## 2018-08-07 DIAGNOSIS — Z23 NEED FOR TDAP VACCINATION: ICD-10-CM

## 2018-08-07 DIAGNOSIS — E78.00 HYPERCHOLESTEREMIA: ICD-10-CM

## 2018-08-07 DIAGNOSIS — I10 HTN (HYPERTENSION), BENIGN: ICD-10-CM

## 2018-08-07 LAB
ALBUMIN SERPL BCP-MCNC: 3.9 G/DL
ALP SERPL-CCNC: 85 U/L
ALT SERPL W/O P-5'-P-CCNC: 40 U/L
ANION GAP SERPL CALC-SCNC: 11 MMOL/L
AST SERPL-CCNC: 24 U/L
BILIRUB SERPL-MCNC: 0.3 MG/DL
BUN SERPL-MCNC: 14 MG/DL
CALCIUM SERPL-MCNC: 9.5 MG/DL
CHLORIDE SERPL-SCNC: 102 MMOL/L
CHOLEST SERPL-MCNC: 295 MG/DL
CHOLEST/HDLC SERPL: 8 {RATIO}
CO2 SERPL-SCNC: 25 MMOL/L
CREAT SERPL-MCNC: 0.8 MG/DL
EST. GFR  (AFRICAN AMERICAN): >60 ML/MIN/1.73 M^2
EST. GFR  (NON AFRICAN AMERICAN): >60 ML/MIN/1.73 M^2
ESTIMATED AVG GLUCOSE: 123 MG/DL
GLUCOSE SERPL-MCNC: 129 MG/DL
HBA1C MFR BLD HPLC: 5.9 %
HDLC SERPL-MCNC: 37 MG/DL
HDLC SERPL: 12.5 %
LDLC SERPL CALC-MCNC: ABNORMAL MG/DL
NONHDLC SERPL-MCNC: 258 MG/DL
POTASSIUM SERPL-SCNC: 4.2 MMOL/L
PROT SERPL-MCNC: 7.1 G/DL
SODIUM SERPL-SCNC: 138 MMOL/L
TRIGL SERPL-MCNC: 657 MG/DL
TSH SERPL DL<=0.005 MIU/L-ACNC: 2.31 UIU/ML

## 2018-08-07 PROCEDURE — 80061 LIPID PANEL: CPT

## 2018-08-07 PROCEDURE — 90471 IMMUNIZATION ADMIN: CPT | Mod: S$GLB,,, | Performed by: FAMILY MEDICINE

## 2018-08-07 PROCEDURE — 80053 COMPREHEN METABOLIC PANEL: CPT

## 2018-08-07 PROCEDURE — 36415 COLL VENOUS BLD VENIPUNCTURE: CPT | Mod: PO

## 2018-08-07 PROCEDURE — 84443 ASSAY THYROID STIM HORMONE: CPT

## 2018-08-07 PROCEDURE — 83036 HEMOGLOBIN GLYCOSYLATED A1C: CPT

## 2018-08-07 PROCEDURE — 3044F HG A1C LEVEL LT 7.0%: CPT | Mod: CPTII,S$GLB,, | Performed by: FAMILY MEDICINE

## 2018-08-07 PROCEDURE — 3008F BODY MASS INDEX DOCD: CPT | Mod: CPTII,S$GLB,, | Performed by: FAMILY MEDICINE

## 2018-08-07 PROCEDURE — 3077F SYST BP >= 140 MM HG: CPT | Mod: CPTII,S$GLB,, | Performed by: FAMILY MEDICINE

## 2018-08-07 PROCEDURE — 99214 OFFICE O/P EST MOD 30 MIN: CPT | Mod: 25,S$GLB,, | Performed by: FAMILY MEDICINE

## 2018-08-07 PROCEDURE — 3080F DIAST BP >= 90 MM HG: CPT | Mod: CPTII,S$GLB,, | Performed by: FAMILY MEDICINE

## 2018-08-07 PROCEDURE — 99999 PR PBB SHADOW E&M-EST. PATIENT-LVL III: CPT | Mod: PBBFAC,,, | Performed by: FAMILY MEDICINE

## 2018-08-07 PROCEDURE — 90715 TDAP VACCINE 7 YRS/> IM: CPT | Mod: S$GLB,,, | Performed by: FAMILY MEDICINE

## 2018-08-07 RX ORDER — ATORVASTATIN CALCIUM 20 MG/1
20 TABLET, FILM COATED ORAL DAILY
Qty: 90 TABLET | Refills: 3 | Status: SHIPPED | OUTPATIENT
Start: 2018-08-07 | End: 2019-09-05 | Stop reason: SDUPTHER

## 2018-08-07 NOTE — PROGRESS NOTES
Subjective:       Patient ID: Wily Rodrigues is a 53 y.o. male.    Chief Complaint: Establish Care    HPI   Pt is here to establish care he is generally well stable dm not on meds not checking his sugars however no polyuria/dipsia/phagia  Pt has htn stable on ace no cough no sob/cp bp elevated today but pt declines med change as he has been off his cpap machine for about a month  Pt has hypothyroid stable on synthroid no unexplained weight changes   Pt has hypercholesterolemia he is not on a statin currently but has been prescribed one in the past no muscle aches on statin  Review of Systems   Constitutional: Negative for activity change, chills, fatigue, fever and unexpected weight change.   HENT: Negative for congestion, ear pain, hearing loss, postnasal drip, rhinorrhea, sinus pressure, sore throat and trouble swallowing.    Eyes: Negative for photophobia, pain, discharge, redness and visual disturbance.   Respiratory: Negative for cough, chest tightness, shortness of breath and wheezing.    Cardiovascular: Negative for chest pain, palpitations and leg swelling.   Gastrointestinal: Negative for abdominal pain, blood in stool, constipation, diarrhea, nausea and vomiting.   Endocrine: Negative for polydipsia and polyuria.   Genitourinary: Negative for decreased urine volume, difficulty urinating, discharge, dysuria, frequency, hematuria and urgency.   Musculoskeletal: Negative for arthralgias, back pain, joint swelling and neck pain.   Skin: Negative for color change, pallor and rash.   Neurological: Negative for dizziness, seizures, speech difficulty, weakness, numbness and headaches.   Hematological: Does not bruise/bleed easily.   Psychiatric/Behavioral: Negative for behavioral problems, confusion, decreased concentration, dysphoric mood and suicidal ideas.       Objective:      Physical Exam   Constitutional: He is oriented to person, place, and time. He appears well-developed and well-nourished. No  "distress.   HENT:   Head: Normocephalic and atraumatic.   Nose: Nose normal.   Mouth/Throat: Oropharynx is clear and moist.   Eyes: EOM are normal. Pupils are equal, round, and reactive to light.   Neck: Normal range of motion. Neck supple. No thyromegaly present.   Cardiovascular: Normal rate, regular rhythm and intact distal pulses.  Exam reveals no gallop and no friction rub.    Pulmonary/Chest: Effort normal and breath sounds normal. No respiratory distress.   Abdominal: Soft. Bowel sounds are normal. He exhibits no distension. There is no tenderness. There is no rebound and no guarding.   Genitourinary:   Genitourinary Comments: declined   Musculoskeletal: Normal range of motion. He exhibits no edema or tenderness.   Neurological: He is alert and oriented to person, place, and time. No cranial nerve deficit. Coordination normal.   Skin: Skin is warm and dry. No erythema.   Psychiatric: He has a normal mood and affect. His behavior is normal. Judgment and thought content normal.       Assessment:       2. Need for Tdap vaccination    1. Uncontrolled type 2 diabetes mellitus without complication, without long-term current use of insulin    3. HTN (hypertension), benign        Plan:     orders cmp lipid hgb a1c tsh urine  Cont meds   ada diet  Graded exercise  Restart cpap  rtc 1 month bp check     "This note will not be shared with the patient."   "

## 2018-08-07 NOTE — MEDICAL/APP STUDENT
Subjective:       Patient ID: Wily Rodrigues is a 53 y.o. male.    Chief Complaint: Establish Care    53 y.o male here for first visit, transfer of care from Dr Rogers, he reports he is doing well. No current symptomatic complaints. Does endorse he needs to use his cpap machine more. Does see a health  and sleep specialist.       Review of Systems   Constitutional: Negative for chills, fatigue and fever.   Respiratory: Negative for cough, chest tightness and shortness of breath.    Gastrointestinal: Negative for constipation, diarrhea, nausea and vomiting.   Endocrine: Negative for polydipsia, polyphagia and polyuria.   Genitourinary: Negative for difficulty urinating and dysuria.   Neurological: Negative for weakness.   Psychiatric/Behavioral: Negative for agitation and behavioral problems.   All other systems reviewed and are negative.      Objective:      Physical Exam   Constitutional: He is oriented to person, place, and time. He appears well-developed and well-nourished. No distress.   HENT:   Head: Normocephalic and atraumatic.   Right Ear: External ear normal.   Left Ear: External ear normal.   Eyes: EOM are normal. Pupils are equal, round, and reactive to light.   Neck: Normal range of motion. Neck supple. No thyromegaly present.   Cardiovascular: Normal rate, regular rhythm, normal heart sounds and intact distal pulses.  Exam reveals no friction rub.    No murmur heard.  Pulmonary/Chest: Effort normal and breath sounds normal. No respiratory distress. He has no wheezes. He exhibits no tenderness.   Abdominal: Soft. Bowel sounds are normal. He exhibits no distension. There is no tenderness.   Musculoskeletal: Normal range of motion. He exhibits no edema, tenderness or deformity.   Neurological: He is alert and oriented to person, place, and time.   Skin: Skin is warm and dry. Capillary refill takes less than 2 seconds. He is not diaphoretic.   Psychiatric: He has a normal mood and affect. His  behavior is normal.   Nursing note and vitals reviewed.      Assessment:       1. Need for Tdap vaccination    2. Uncontrolled type 2 diabetes mellitus without complication, without long-term current use of insulin    3. HTN (hypertension), benign        Plan:       1. Basic labs for baseline  -- Lipids, TSH, CBC, CMP, UA  2. HTN  -- Try CPAP again, will review in Dec  3. DM  -- Well controlled    F/U: in Dec 2018

## 2018-08-07 NOTE — PATIENT INSTRUCTIONS
Your test results will be communicated to you via : My [facility], Telephone or Letter.   If you have not received your test results in one week, please contact the clinic at [phone].

## 2018-08-07 NOTE — PROGRESS NOTES
Two patient identifiers verified. Allergies reviewed.  Tdap IM administered to Right deltoid per MD order. Patient tolerated injection well: no redness, bleeding, or bruising noted to injection site. Patient instructed to remain in clinic setting for 15 minutes.

## 2018-08-14 ENCOUNTER — TELEPHONE (OUTPATIENT)
Dept: FAMILY MEDICINE | Facility: CLINIC | Age: 53
End: 2018-08-14

## 2018-08-14 NOTE — TELEPHONE ENCOUNTER
----- Message from Areli Kramer MD sent at 8/9/2018  7:20 AM CDT -----  Please have pt come in to go over results

## 2018-08-16 ENCOUNTER — TELEPHONE (OUTPATIENT)
Dept: FAMILY MEDICINE | Facility: CLINIC | Age: 53
End: 2018-08-16

## 2018-08-21 ENCOUNTER — PATIENT MESSAGE (OUTPATIENT)
Dept: ADMINISTRATIVE | Facility: OTHER | Age: 53
End: 2018-08-21

## 2018-08-29 RX ORDER — LEVOTHYROXINE SODIUM 25 UG/1
25 TABLET ORAL
Qty: 90 TABLET | Refills: 0 | Status: SHIPPED | OUTPATIENT
Start: 2018-08-29 | End: 2018-11-27 | Stop reason: SDUPTHER

## 2018-09-05 ENCOUNTER — CLINICAL SUPPORT (OUTPATIENT)
Dept: INTERNAL MEDICINE | Facility: CLINIC | Age: 53
End: 2018-09-05
Payer: COMMERCIAL

## 2018-09-05 VITALS — WEIGHT: 220.69 LBS | BODY MASS INDEX: 31.66 KG/M2

## 2018-09-05 NOTE — PROGRESS NOTES
Health  Follow-Up Note   [x] Office  [] Phone  Notes from previous session reviewed.   [x] Previous Session Goals unchanged.   [] Patient/Caregiver Change in Goals.  Goals added or changed by Patient/Caregiver since program participation:  1.     Get back on track  2. Get back to gym  3. Started taking Lipitor again was out of medication     Additional/Changed support that patient/caregiver has experienced/sought?  (Indicate readiness, support from family, friends, others, community groups, etc)  1.  Friends, roommate    Additional/Changed obstacles that could prevent patient/caregiver from reaching their goals?  1.  Heat not being able to workout  2. Eating junk food    Feedback provided:  1.  Praised for continued effort and determination    Diagnostic values/Desriptors for follow-up as needed for chronic condition(s)   Weight: 100.1 kg 220.68 lb gain 1.76 lbs since last visit 7/25/18.    Interventions:   1. Health  listened reflectively, validated thoughts and feelings, offered support and encouragement.   2. Allowed patient to express themselves in a non-biased atmosphere.  3. Health  assisted pt to problem-solve obstacles such as being in a challenging environment and dealing with these challenges.   4. Motivational Interviewed interventions utilized (OARS).   5. Patient responded favorably to interventions and remained actively engaged in the session.   6. Health  will remain available and connected for patient by phone and/or office visits.   7. Positive reinforcement, emotional support and encouragement provided.   8. Focused Education: MI, recipes, reviewed elevated cholesterol panel, taking Lipitor, getting of processed foods.     Plan:  [x] Pt will work on goals as stated above.   [x] Pt will contact Health  for any questions, concerns or needs.  [x] Pt will follow up with Health  in office on   9/19/18 at 0700.     [] Pt will follow up with Health  on phone in:         [x] Health  will remain available.   [] Health  will contact patient by phone in:        [] Health  will consult:        [] Health  will inform Provider via EPIC messaging.     Impression:  1. Behavior is consistent with   Action    Stage of Change.   2. Participation level:       [x] Receptive      [x] Interactive      [] Guarded and Resistant      [x] Self Motivated      [] Refused/Declined to participate   3. [x] Pt voiced understanding of all information presented.       [] Pt voiced needing further information/education. This will be arranged.       [x] Pt would benefit from further education/information as identified by this health . This will be arranged.     Laura Burch RN HC

## 2018-09-19 ENCOUNTER — CLINICAL SUPPORT (OUTPATIENT)
Dept: INTERNAL MEDICINE | Facility: CLINIC | Age: 53
End: 2018-09-19
Payer: COMMERCIAL

## 2018-09-19 VITALS — WEIGHT: 222.44 LBS | BODY MASS INDEX: 31.92 KG/M2

## 2018-09-19 NOTE — PROGRESS NOTES
Health  Follow-Up Note   [x] Office  [] Phone  Notes from previous session reviewed.   [x] Previous Session Goals unchanged.   [] Patient/Caregiver Change in Goals.  Goals added or changed by Patient/Caregiver since program participation:  1.  Get back on track  2. Wearing C- Pap machine  3. Exercise         Additional/Changed support that patient/caregiver has experienced/sought?  (Indicate readiness, support from family, friends, others, community groups, etc)  1.   Friends    Additional/Changed obstacles that could prevent patient/caregiver from reaching their goals?  1. Heat   2. Not wearing cpap     Feedback provided:  1.  Praised for continued effort and determination    Diagnostic values/Desriptors for follow-up as needed for chronic condition(s)   Weight: 100.9 kg 222.44 lb  Blood Glucose: 128 in office today    Interventions:   1. Health  listened reflectively, validated thoughts and feelings, offered support and encouragement.   2. Allowed patient to express themselves in a non-biased atmosphere.  3. Health  assisted pt to problem-solve obstacles such as being in a challenging environment and dealing with these challenges.   4. Motivational Interviewed interventions utilized (OARS).   5. Patient responded favorably to interventions and remained actively engaged in the session.   6. Health  will remain available and connected for patient by phone and/or office visits.   7. Positive reinforcement, emotional support and encouragement provided.   8. Focused Education: MI    Plan:  [x] Pt will work on goals as stated above.   [x] Pt will contact Health  for any questions, concerns or needs.  [x] Pt will follow up with Health  in office on    10/10/18 at 0700.    [] Pt will follow up with Health  on phone in:        [x] Health  will remain available.   [] Health  will contact patient by phone in:        [] Health  will consult:        [] Health  will inform  Provider via EPIC messaging.     Impression:  1. Behavior is consistent with    Action   Stage of Change.   2. Participation level:       [x] Receptive      [x] Interactive      [] Guarded and Resistant      [x] Self Motivated      [] Refused/Declined to participate   3. [x] Pt voiced understanding of all information presented.       [] Pt voiced needing further information/education. This will be arranged.       [x] Pt would benefit from further education/information as identified by this health . This will be arranged.     Laura Burch RN HC

## 2018-10-17 ENCOUNTER — CLINICAL SUPPORT (OUTPATIENT)
Dept: INTERNAL MEDICINE | Facility: CLINIC | Age: 53
End: 2018-10-17
Payer: COMMERCIAL

## 2018-10-17 VITALS — BODY MASS INDEX: 31.22 KG/M2 | WEIGHT: 217.63 LBS

## 2018-10-17 NOTE — PROGRESS NOTES
Health  Follow-Up Note   [x] Office  [] Phone  Notes from previous session reviewed.   [x] Previous Session Goals unchanged.   [] Patient/Caregiver Change in Goals.  Goals added or changed by Patient/Caregiver since program participation:  1. Continue same plan         Additional/Changed support that patient/caregiver has experienced/sought?  (Indicate readiness, support from family, friends, others, community groups, etc)  1.  Friends    Additional/Changed obstacles that could prevent patient/caregiver from reaching their goals?  1.  Going to [facility] for 5 days tomorrow    Feedback provided:  1. Praised for great job down 5 lbs     Diagnostic values/Desriptors for follow-up as needed for chronic condition(s)   Weight: 98.7 kg 217.59 lb down 5 lbs in 4 weeks    Interventions:   1. Health  listened reflectively, validated thoughts and feelings, offered support and encouragement.   2. Allowed patient to express themselves in a non-biased atmosphere.  3. Health  assisted pt to problem-solve obstacles such as being in a challenging environment and dealing with these challenges.   4. Motivational Interviewed interventions utilized (OARS).   5. Patient responded favorably to interventions and remained actively engaged in the session.   6. Health  will remain available and connected for patient by phone and/or office visits.   7. Positive reinforcement, emotional support and encouragement provided.   8. Focused Education: MI    Plan:  [x] Pt will work on goals as stated above.   [x] Pt will contact Health  for any questions, concerns or needs.  [x] Pt will follow up with Health  in office on   11/9/18 at 700.     [] Pt will follow up with Health  on phone in:        [x] Health  will remain available.   [] Health  will contact patient by phone in:        [] Health  will consult:        [] Health  will inform Provider via EPIC messaging.     Impression:  1. Behavior is  consistent with    Action   Stage of Change.   2. Participation level:       [x] Receptive      [x] Interactive      [] Guarded and Resistant      [x] Self Motivated      [] Refused/Declined to participate   3. [x] Pt voiced understanding of all information presented.       [] Pt voiced needing further information/education. This will be arranged.       [x] Pt would benefit from further education/information as identified by this health . This will be arranged.     Laura Burch RN HC

## 2018-11-06 ENCOUNTER — PATIENT MESSAGE (OUTPATIENT)
Dept: ADMINISTRATIVE | Facility: OTHER | Age: 53
End: 2018-11-06

## 2018-11-14 ENCOUNTER — CLINICAL SUPPORT (OUTPATIENT)
Dept: INTERNAL MEDICINE | Facility: CLINIC | Age: 53
End: 2018-11-14
Payer: COMMERCIAL

## 2018-11-14 VITALS — WEIGHT: 214.75 LBS | BODY MASS INDEX: 30.81 KG/M2

## 2018-11-14 NOTE — PROGRESS NOTES
Health  Follow-Up Note   [x] Office  [] Phone  Notes from previous session reviewed.   [x] Previous Session Goals unchanged.   [] Patient/Caregiver Change in Goals.  Goals added or changed by Patient/Caregiver since program participation:  1.  Continue same plan     2. Get more active and increase exercise now that is getting cooler feeling better working out     Additional/Changed support that patient/caregiver has experienced/sought?  (Indicate readiness, support from family, friends, others, community groups, etc)  1.  Friends    Additional/Changed obstacles that could prevent patient/caregiver from reaching their goals?  1.  party for sister    Feedback provided:  1.  Praised for great job down 2.86 lb     Diagnostic values/Desriptors for follow-up as needed for chronic condition(s)   Weight: 97.4 kg 214.73 lb down 2.86 lbs total 14.55 now    Interventions:   1. Health  listened reflectively, validated thoughts and feelings, offered support and encouragement.   2. Allowed patient to express themselves in a non-biased atmosphere.  3. Health  assisted pt to problem-solve obstacles such as being in a challenging environment and dealing with these challenges.   4. Motivational Interviewed interventions utilized (OARS).   5. Patient responded favorably to interventions and remained actively engaged in the session.   6. Health  will remain available and connected for patient by phone and/or office visits.   7. Positive reinforcement, emotional support and encouragement provided.   8. Focused Education: MI    Plan:  [x] Pt will work on goals as stated above.   [x] Pt will contact Health  for any questions, concerns or needs.  [x] Pt will follow up with Health  in office on   12/13/18 at 0700.     [] Pt will follow up with Health  on phone in:        [x] Health  will remain available.   [] Health  will contact patient by phone in:        [] Health  will consult:         [] Health  will inform Provider via EPIC messaging.     Impression:  1. Behavior is consistent with   Action    Stage of Change.   2. Participation level:       [x] Receptive      [x] Interactive      [] Guarded and Resistant      [x] Self Motivated      [] Refused/Declined to participate   3. [x] Pt voiced understanding of all information presented.       [] Pt voiced needing further information/education. This will be arranged.       [x] Pt would benefit from further education/information as identified by this health . This will be arranged.     Laura Burch RN HC

## 2018-11-26 ENCOUNTER — PATIENT OUTREACH (OUTPATIENT)
Dept: ADMINISTRATIVE | Facility: HOSPITAL | Age: 53
End: 2018-11-26

## 2018-11-26 NOTE — PROGRESS NOTES
Left message for patient to contact office to schedule optho appointment or update if eye exam was completed at an outside facility.

## 2018-11-27 RX ORDER — LEVOTHYROXINE SODIUM 25 UG/1
25 TABLET ORAL
Qty: 90 TABLET | Refills: 0 | Status: SHIPPED | OUTPATIENT
Start: 2018-11-27 | End: 2019-12-04 | Stop reason: SDUPTHER

## 2018-11-27 RX ORDER — LEVOTHYROXINE SODIUM 25 UG/1
25 TABLET ORAL
Qty: 90 TABLET | Refills: 0 | Status: SHIPPED | OUTPATIENT
Start: 2018-11-27 | End: 2018-11-27 | Stop reason: SDUPTHER

## 2018-11-30 ENCOUNTER — OFFICE VISIT (OUTPATIENT)
Dept: FAMILY MEDICINE | Facility: CLINIC | Age: 53
End: 2018-11-30
Attending: FAMILY MEDICINE
Payer: COMMERCIAL

## 2018-11-30 VITALS
OXYGEN SATURATION: 98 % | BODY MASS INDEX: 31.18 KG/M2 | HEART RATE: 64 BPM | SYSTOLIC BLOOD PRESSURE: 124 MMHG | WEIGHT: 217.81 LBS | HEIGHT: 70 IN | DIASTOLIC BLOOD PRESSURE: 80 MMHG

## 2018-11-30 DIAGNOSIS — Z53.21 PATIENT LEFT AFTER TRIAGE: ICD-10-CM

## 2018-11-30 DIAGNOSIS — E11.9 TYPE 2 DIABETES MELLITUS WITHOUT COMPLICATION, WITHOUT LONG-TERM CURRENT USE OF INSULIN: Primary | ICD-10-CM

## 2018-11-30 DIAGNOSIS — I10 ESSENTIAL HYPERTENSION: ICD-10-CM

## 2018-11-30 PROCEDURE — 99999 PR PBB SHADOW E&M-EST. PATIENT-LVL III: CPT | Mod: PBBFAC,,, | Performed by: FAMILY MEDICINE

## 2018-11-30 PROCEDURE — 99499 UNLISTED E&M SERVICE: CPT | Mod: S$GLB,,, | Performed by: FAMILY MEDICINE

## 2018-11-30 NOTE — PATIENT INSTRUCTIONS
Your test results will be communicated to you via : [facility], Telephone or Letter.   If you have not received your test results in one week, please contact the clinic at [phone].

## 2018-12-03 ENCOUNTER — TELEPHONE (OUTPATIENT)
Dept: FAMILY MEDICINE | Facility: CLINIC | Age: 53
End: 2018-12-03

## 2018-12-12 ENCOUNTER — CLINICAL SUPPORT (OUTPATIENT)
Dept: INTERNAL MEDICINE | Facility: CLINIC | Age: 53
End: 2018-12-12
Payer: COMMERCIAL

## 2018-12-12 VITALS — WEIGHT: 215.38 LBS | BODY MASS INDEX: 30.91 KG/M2

## 2018-12-12 NOTE — PROGRESS NOTES
Health  Follow-Up Note   [x] Office  [] Phone  Notes from previous session reviewed.   [x] Previous Session Goals unchanged.   [] Patient/Caregiver Change in Goals.  Goals added or changed by Patient/Caregiver since program participation:  1.   Continue same plan    2. Get back on track   3. Get back to exercise  4. R/s appt with PCP, check on lab orders     Additional/Changed support that patient/caregiver has experienced/sought?  (Indicate readiness, support from family, friends, others, community groups, etc)  1.  Friends    Additional/Changed obstacles that could prevent patient/caregiver from reaching their goals?  1.  Holidays  2. Not exercising    Feedback provided:  1.  Praised for continued effort and determination    Diagnostic values/Desriptors for follow-up as needed for chronic condition(s)   Weight: 97.7 kg 215.39 lb gain 0.6 lb     Interventions:   1. Health  listened reflectively, validated thoughts and feelings, offered support and encouragement.   2. Allowed patient to express themselves in a non-biased atmosphere.  3. Health  assisted pt to problem-solve obstacles such as being in a challenging environment and dealing with these challenges.   4. Motivational Interviewed interventions utilized (OARS).   5. Patient responded favorably to interventions and remained actively engaged in the session.   6. Health  will remain available and connected for patient by phone and/or office visits.   7. Positive reinforcement, emotional support and encouragement provided.   8. Focused Education: MI    Plan:  [x] Pt will work on goals as stated above.   [x] Pt will contact Health  for any questions, concerns or needs.  [x] Pt will follow up with Health  in office on  1/18/19 at 700.      [] Pt will follow up with Health  on phone in:        [x] Health  will remain available.   [] Health  will contact patient by phone in:        [] Health  will consult:        []  Health  will inform Provider via EPIC messaging.     Impression:  1. Behavior is consistent with    Action   Stage of Change.   2. Participation level:       [x] Receptive      [x] Interactive      [] Guarded and Resistant      [x] Self Motivated      [] Refused/Declined to participate   3. [x] Pt voiced understanding of all information presented.       [] Pt voiced needing further information/education. This will be arranged.       [x] Pt would benefit from further education/information as identified by this health . This will be arranged.     Laura Burch RN HC

## 2018-12-13 ENCOUNTER — PATIENT MESSAGE (OUTPATIENT)
Dept: FAMILY MEDICINE | Facility: CLINIC | Age: 53
End: 2018-12-13

## 2018-12-14 ENCOUNTER — TELEPHONE (OUTPATIENT)
Dept: FAMILY MEDICINE | Facility: CLINIC | Age: 53
End: 2018-12-14

## 2018-12-14 NOTE — TELEPHONE ENCOUNTER
----- Message from Areli Kramer MD sent at 12/13/2018  6:56 PM CST -----  Please help pt set up an appt to see me

## 2019-01-04 ENCOUNTER — OFFICE VISIT (OUTPATIENT)
Dept: FAMILY MEDICINE | Facility: CLINIC | Age: 54
End: 2019-01-04
Attending: FAMILY MEDICINE
Payer: COMMERCIAL

## 2019-01-04 ENCOUNTER — LAB VISIT (OUTPATIENT)
Dept: LAB | Facility: HOSPITAL | Age: 54
End: 2019-01-04
Attending: FAMILY MEDICINE
Payer: COMMERCIAL

## 2019-01-04 ENCOUNTER — PATIENT MESSAGE (OUTPATIENT)
Dept: SLEEP MEDICINE | Facility: CLINIC | Age: 54
End: 2019-01-04

## 2019-01-04 VITALS
DIASTOLIC BLOOD PRESSURE: 80 MMHG | HEART RATE: 62 BPM | HEIGHT: 70 IN | SYSTOLIC BLOOD PRESSURE: 124 MMHG | BODY MASS INDEX: 31.4 KG/M2 | WEIGHT: 219.31 LBS | OXYGEN SATURATION: 99 % | RESPIRATION RATE: 16 BRPM

## 2019-01-04 DIAGNOSIS — E11.9 CONTROLLED TYPE 2 DIABETES MELLITUS WITHOUT COMPLICATION, WITHOUT LONG-TERM CURRENT USE OF INSULIN: ICD-10-CM

## 2019-01-04 DIAGNOSIS — Z12.11 SCREENING FOR COLORECTAL CANCER: ICD-10-CM

## 2019-01-04 DIAGNOSIS — Z00.00 ANNUAL PHYSICAL EXAM: ICD-10-CM

## 2019-01-04 DIAGNOSIS — E78.00 HYPERCHOLESTEROLEMIA: ICD-10-CM

## 2019-01-04 DIAGNOSIS — E11.9 TYPE 2 DIABETES MELLITUS WITHOUT COMPLICATION, UNSPECIFIED WHETHER LONG TERM INSULIN USE: ICD-10-CM

## 2019-01-04 DIAGNOSIS — Z12.12 SCREENING FOR COLORECTAL CANCER: ICD-10-CM

## 2019-01-04 DIAGNOSIS — Z00.00 ANNUAL PHYSICAL EXAM: Primary | ICD-10-CM

## 2019-01-04 LAB
ALBUMIN SERPL BCP-MCNC: 3.9 G/DL
ALBUMIN/CREAT UR: 6.7 UG/MG
ALP SERPL-CCNC: 83 U/L
ALT SERPL W/O P-5'-P-CCNC: 48 U/L
ANION GAP SERPL CALC-SCNC: 9 MMOL/L
AST SERPL-CCNC: 24 U/L
BASOPHILS # BLD AUTO: 0.04 K/UL
BASOPHILS NFR BLD: 0.6 %
BILIRUB SERPL-MCNC: 0.6 MG/DL
BILIRUB SERPL-MCNC: NORMAL MG/DL
BLOOD URINE, POC: NORMAL
BUN SERPL-MCNC: 15 MG/DL
CALCIUM SERPL-MCNC: 9.3 MG/DL
CHLORIDE SERPL-SCNC: 104 MMOL/L
CHOLEST SERPL-MCNC: 207 MG/DL
CHOLEST/HDLC SERPL: 5.3 {RATIO}
CO2 SERPL-SCNC: 25 MMOL/L
COLOR, POC UA: YELLOW
CREAT SERPL-MCNC: 0.8 MG/DL
CREAT UR-MCNC: 163 MG/DL
DIFFERENTIAL METHOD: NORMAL
EOSINOPHIL # BLD AUTO: 0.1 K/UL
EOSINOPHIL NFR BLD: 2.2 %
ERYTHROCYTE [DISTWIDTH] IN BLOOD BY AUTOMATED COUNT: 12.5 %
EST. GFR  (AFRICAN AMERICAN): >60 ML/MIN/1.73 M^2
EST. GFR  (NON AFRICAN AMERICAN): >60 ML/MIN/1.73 M^2
ESTIMATED AVG GLUCOSE: 128 MG/DL
GLUCOSE SERPL-MCNC: 129 MG/DL
GLUCOSE UR QL STRIP: NORMAL
HBA1C MFR BLD HPLC: 6.1 %
HCT VFR BLD AUTO: 43 %
HDLC SERPL-MCNC: 39 MG/DL
HDLC SERPL: 18.8 %
HGB BLD-MCNC: 14.3 G/DL
IMM GRANULOCYTES # BLD AUTO: 0.02 K/UL
IMM GRANULOCYTES NFR BLD AUTO: 0.3 %
KETONES UR QL STRIP: NORMAL
LDLC SERPL CALC-MCNC: 116.2 MG/DL
LEUKOCYTE ESTERASE URINE, POC: NORMAL
LYMPHOCYTES # BLD AUTO: 2 K/UL
LYMPHOCYTES NFR BLD: 31 %
MCH RBC QN AUTO: 31 PG
MCHC RBC AUTO-ENTMCNC: 33.3 G/DL
MCV RBC AUTO: 93 FL
MICROALBUMIN UR DL<=1MG/L-MCNC: 11 UG/ML
MONOCYTES # BLD AUTO: 0.5 K/UL
MONOCYTES NFR BLD: 7.1 %
NEUTROPHILS # BLD AUTO: 3.8 K/UL
NEUTROPHILS NFR BLD: 58.8 %
NITRITE, POC UA: NORMAL
NONHDLC SERPL-MCNC: 168 MG/DL
NRBC BLD-RTO: 0 /100 WBC
PH, POC UA: 5
PLATELET # BLD AUTO: 243 K/UL
PMV BLD AUTO: 11.5 FL
POTASSIUM SERPL-SCNC: 4.2 MMOL/L
PROT SERPL-MCNC: 7.2 G/DL
PROTEIN, POC: NORMAL
RBC # BLD AUTO: 4.62 M/UL
SODIUM SERPL-SCNC: 138 MMOL/L
SPECIFIC GRAVITY, POC UA: 1.02
TRIGL SERPL-MCNC: 259 MG/DL
TSH SERPL DL<=0.005 MIU/L-ACNC: 2.82 UIU/ML
UROBILINOGEN, POC UA: NORMAL
WBC # BLD AUTO: 6.45 K/UL

## 2019-01-04 PROCEDURE — 82043 UR ALBUMIN QUANTITATIVE: CPT

## 2019-01-04 PROCEDURE — 84443 ASSAY THYROID STIM HORMONE: CPT

## 2019-01-04 PROCEDURE — 90686 FLU VACCINE (QUAD) GREATER THAN OR EQUAL TO 3YO PRESERVATIVE FREE IM: ICD-10-PCS | Mod: S$GLB,,, | Performed by: FAMILY MEDICINE

## 2019-01-04 PROCEDURE — 99999 PR PBB SHADOW E&M-EST. PATIENT-LVL IV: ICD-10-PCS | Mod: PBBFAC,,, | Performed by: FAMILY MEDICINE

## 2019-01-04 PROCEDURE — 3079F PR MOST RECENT DIASTOLIC BLOOD PRESSURE 80-89 MM HG: ICD-10-PCS | Mod: CPTII,S$GLB,, | Performed by: FAMILY MEDICINE

## 2019-01-04 PROCEDURE — 90471 IMMUNIZATION ADMIN: CPT | Mod: S$GLB,,, | Performed by: FAMILY MEDICINE

## 2019-01-04 PROCEDURE — 85025 COMPLETE CBC W/AUTO DIFF WBC: CPT

## 2019-01-04 PROCEDURE — 3079F DIAST BP 80-89 MM HG: CPT | Mod: CPTII,S$GLB,, | Performed by: FAMILY MEDICINE

## 2019-01-04 PROCEDURE — 80053 COMPREHEN METABOLIC PANEL: CPT

## 2019-01-04 PROCEDURE — 99396 PR PREVENTIVE VISIT,EST,40-64: ICD-10-PCS | Mod: 25,S$GLB,, | Performed by: FAMILY MEDICINE

## 2019-01-04 PROCEDURE — 83036 HEMOGLOBIN GLYCOSYLATED A1C: CPT

## 2019-01-04 PROCEDURE — 90686 IIV4 VACC NO PRSV 0.5 ML IM: CPT | Mod: S$GLB,,, | Performed by: FAMILY MEDICINE

## 2019-01-04 PROCEDURE — 99396 PREV VISIT EST AGE 40-64: CPT | Mod: 25,S$GLB,, | Performed by: FAMILY MEDICINE

## 2019-01-04 PROCEDURE — 80061 LIPID PANEL: CPT

## 2019-01-04 PROCEDURE — 99999 PR PBB SHADOW E&M-EST. PATIENT-LVL IV: CPT | Mod: PBBFAC,,, | Performed by: FAMILY MEDICINE

## 2019-01-04 PROCEDURE — 90471 FLU VACCINE (QUAD) GREATER THAN OR EQUAL TO 3YO PRESERVATIVE FREE IM: ICD-10-PCS | Mod: S$GLB,,, | Performed by: FAMILY MEDICINE

## 2019-01-04 PROCEDURE — 3044F PR MOST RECENT HEMOGLOBIN A1C LEVEL <7.0%: ICD-10-PCS | Mod: CPTII,S$GLB,, | Performed by: FAMILY MEDICINE

## 2019-01-04 PROCEDURE — 81001 URINALYSIS AUTO W/SCOPE: CPT | Mod: S$GLB,,, | Performed by: FAMILY MEDICINE

## 2019-01-04 PROCEDURE — 81001 POCT URINALYSIS, DIPSTICK OR TABLET REAGENT, AUTOMATED, WITH MICROSCOP: ICD-10-PCS | Mod: S$GLB,,, | Performed by: FAMILY MEDICINE

## 2019-01-04 PROCEDURE — 3074F PR MOST RECENT SYSTOLIC BLOOD PRESSURE < 130 MM HG: ICD-10-PCS | Mod: CPTII,S$GLB,, | Performed by: FAMILY MEDICINE

## 2019-01-04 PROCEDURE — 3074F SYST BP LT 130 MM HG: CPT | Mod: CPTII,S$GLB,, | Performed by: FAMILY MEDICINE

## 2019-01-04 PROCEDURE — 36415 COLL VENOUS BLD VENIPUNCTURE: CPT | Mod: PO

## 2019-01-04 PROCEDURE — 3044F HG A1C LEVEL LT 7.0%: CPT | Mod: CPTII,S$GLB,, | Performed by: FAMILY MEDICINE

## 2019-01-04 NOTE — PROGRESS NOTES
Two patient identifiers verified. Allergies reviewed.  FLU Vaccine IM administered to Left deltoid per MD order. Patient tolerated injection well: no redness, bleeding, or bruising noted to injection site. Patient instructed to remain in clinic setting for 15 minutes.       FitKit was given to patient on 1/4/2019 11:44 AM

## 2019-01-04 NOTE — PROGRESS NOTES
Subjective:       Patient ID: Wily Rodrigues is a 53 y.o. male.    Chief Complaint: Annual Exam and Diabetes    HPI   Pt is here for annual exam pt is well no sob/cp no change in bowel habits pt has diet controlled  Pt is not on an ada diet consistently  Pt has hypercholesterolemia on low dose statin he is willing to increase his lipitor if needed no muscle aches   Review of Systems   Constitutional: Negative for activity change, chills, fatigue and fever.   HENT: Negative for congestion, ear pain, hearing loss, postnasal drip, rhinorrhea, sinus pressure and sore throat.    Eyes: Negative for photophobia, pain, redness and visual disturbance.   Respiratory: Negative for cough, chest tightness and shortness of breath.    Cardiovascular: Negative for chest pain, palpitations and leg swelling.   Gastrointestinal: Negative for abdominal pain, blood in stool, constipation, diarrhea, nausea and vomiting.   Genitourinary: Negative for decreased urine volume, difficulty urinating, discharge, dysuria, frequency and urgency.   Musculoskeletal: Negative for back pain, joint swelling and neck pain.   Skin: Negative for color change, pallor and rash.   Neurological: Negative for dizziness, seizures, speech difficulty and numbness.   Hematological: Does not bruise/bleed easily.   Psychiatric/Behavioral: Negative for behavioral problems, confusion, decreased concentration and suicidal ideas.       Objective:      Physical Exam   Constitutional: He is oriented to person, place, and time. He appears well-developed and well-nourished. No distress.   HENT:   Head: Normocephalic and atraumatic.   Nose: Nose normal.   Mouth/Throat: Oropharynx is clear and moist.   Eyes: EOM are normal. Pupils are equal, round, and reactive to light.   Neck: Normal range of motion. Neck supple. No thyromegaly present.   Cardiovascular: Normal rate, regular rhythm and intact distal pulses. Exam reveals no gallop and no friction rub.   Pulmonary/Chest:  "Effort normal and breath sounds normal. No respiratory distress.   Abdominal: Soft. Bowel sounds are normal. He exhibits no distension. There is no tenderness. There is no rebound and no guarding.   Genitourinary:   Genitourinary Comments: declined   Musculoskeletal: Normal range of motion. He exhibits no edema or tenderness.   Neurological: He is alert and oriented to person, place, and time. No cranial nerve deficit. Coordination normal.   Skin: Skin is warm and dry. No erythema.   Psychiatric: He has a normal mood and affect. His behavior is normal. Judgment and thought content normal.       Assessment:       1. Annual physical exam    2. Screening for colorectal cancer    3. Hypercholesterolemia    4. Controlled type 2 diabetes mellitus without complication, without long-term current use of insulin        Plan:     orders cmp lipid hgb a1c  Cont meds  Ada diet  Graded exercise  rtc quarterly    Health maintenance  Lipid ordered  Flu discussed  Tetanus q 10 years  Colonoscopy discussed         "This note will not be shared with the patient."   "

## 2019-01-04 NOTE — PATIENT INSTRUCTIONS
Your test results will be communicated to you via : [facility], Telephone or Letter.   If you have not received your test results in one week, please contact the clinic at [phone].

## 2019-01-10 ENCOUNTER — PATIENT MESSAGE (OUTPATIENT)
Dept: OPTOMETRY | Facility: CLINIC | Age: 54
End: 2019-01-10

## 2019-01-15 ENCOUNTER — OFFICE VISIT (OUTPATIENT)
Dept: SLEEP MEDICINE | Facility: CLINIC | Age: 54
End: 2019-01-15
Payer: COMMERCIAL

## 2019-01-15 VITALS
DIASTOLIC BLOOD PRESSURE: 84 MMHG | WEIGHT: 216.25 LBS | BODY MASS INDEX: 30.96 KG/M2 | HEART RATE: 86 BPM | SYSTOLIC BLOOD PRESSURE: 136 MMHG | HEIGHT: 70 IN

## 2019-01-15 DIAGNOSIS — G47.33 OBSTRUCTIVE SLEEP APNEA: ICD-10-CM

## 2019-01-15 DIAGNOSIS — G47.00 INSOMNIA, UNSPECIFIED TYPE: Primary | ICD-10-CM

## 2019-01-15 DIAGNOSIS — I10 ESSENTIAL HYPERTENSION: ICD-10-CM

## 2019-01-15 PROCEDURE — 3075F SYST BP GE 130 - 139MM HG: CPT | Mod: CPTII,S$GLB,, | Performed by: NURSE PRACTITIONER

## 2019-01-15 PROCEDURE — 99999 PR PBB SHADOW E&M-EST. PATIENT-LVL III: ICD-10-PCS | Mod: PBBFAC,,, | Performed by: NURSE PRACTITIONER

## 2019-01-15 PROCEDURE — 3008F PR BODY MASS INDEX (BMI) DOCUMENTED: ICD-10-PCS | Mod: CPTII,S$GLB,, | Performed by: NURSE PRACTITIONER

## 2019-01-15 PROCEDURE — 3008F BODY MASS INDEX DOCD: CPT | Mod: CPTII,S$GLB,, | Performed by: NURSE PRACTITIONER

## 2019-01-15 PROCEDURE — 99214 OFFICE O/P EST MOD 30 MIN: CPT | Mod: S$GLB,,, | Performed by: NURSE PRACTITIONER

## 2019-01-15 PROCEDURE — 3075F PR MOST RECENT SYSTOLIC BLOOD PRESS GE 130-139MM HG: ICD-10-PCS | Mod: CPTII,S$GLB,, | Performed by: NURSE PRACTITIONER

## 2019-01-15 PROCEDURE — 3079F DIAST BP 80-89 MM HG: CPT | Mod: CPTII,S$GLB,, | Performed by: NURSE PRACTITIONER

## 2019-01-15 PROCEDURE — 99999 PR PBB SHADOW E&M-EST. PATIENT-LVL III: CPT | Mod: PBBFAC,,, | Performed by: NURSE PRACTITIONER

## 2019-01-15 PROCEDURE — 3079F PR MOST RECENT DIASTOLIC BLOOD PRESSURE 80-89 MM HG: ICD-10-PCS | Mod: CPTII,S$GLB,, | Performed by: NURSE PRACTITIONER

## 2019-01-15 PROCEDURE — 99214 PR OFFICE/OUTPT VISIT, EST, LEVL IV, 30-39 MIN: ICD-10-PCS | Mod: S$GLB,,, | Performed by: NURSE PRACTITIONER

## 2019-01-15 RX ORDER — TRAZODONE HYDROCHLORIDE 50 MG/1
50-100 TABLET ORAL NIGHTLY
Qty: 60 TABLET | Refills: 5 | Status: SHIPPED | OUTPATIENT
Start: 2019-01-15 | End: 2019-02-14

## 2019-01-15 NOTE — PROGRESS NOTES
"Wily Rodrigues  was seen as a f/u for the management of obstructive sleep apnea. Last seen 4/2018    Since last seen 2018 he has not been able to use apap much. Falls asleep w/o mask on. Jaw tense, hard to relax bedtime. Will surf tv. Still working with . Needs to resume exercise, used to run 80mi/week and attend gym daily. Feeling tired during daytime.Using dreamwear mask. When used, notices a big difference , sleepiness is better. Was easier to get up in am. Snoring was resolved.   HgBA1c 6.1 (2019)  BP stable  [facility] provided q3mos, stable mood SNRI    HISTORY:  1/14/16:  CHIEF COMPLAINT: CPAP use    HISTORY OF PRESENT ILLNESS:Wily Rodrigues a 53 y.o.  male presents for the management of obstructive sleep apnea. This is his initial visit in Sleep Clinic. Previously seen by Dr. Lee in different dept 2009.  He was diagnosed with AJ previously and has been using it inconsistently of late. Split-night study in 2009 revealed effective pressure of 22/20cm which was intolerable to him. A bipap titration study in 2012 (221#) revealed needed pressures up to 20/16cm to eliminate snoring. Mouth leak and arousals could have contributed to suboptimal pressures. He was switched to autobipap therapy 16/14cm. He has used it inconsistently. He is motivated now to begin using it better. He is seeing a health  (turns 51 next month). He uses a nasal mask. He has resumed using it recently for a short time. Without therapy, he reports snoring. He report symptoms of disruptive snoring, un-refreshing sleep, and excessive daytime sleepiness.     Denies symptoms of restless legs or kicking during sleep.     On todays [place] Sleepiness Scale the patient scores a 13/24.     Interrogation: old remstar machine fair condition, manometer 12cm. Heat at 4. Total therapy hrs 1517h. 30d avg 0:36h/n    4/2018:   jose luis last seen 2017,he got setup apap 10-20cm. "time for my annual non-compliance visit". No machine today. Using " "dreamwear mask. When used, notices a big difference , sleepiness is better. Was easier to get up in am. Snoring resolved. Just got new supplies from [employer]. Falls asleep w/o mask a lot, tv on. Seeing health  still. Using sleep apnea pillow from [facility] [place] has helped. Wants med to help sleep onset. ESS=9. +anixety/tense in shoulers, needs to relax more.    HgBA1c 5.6 (today)    Interrogation- no machine.     PSG 3/27/09: (207#) RDI 9.3 (REM RDI 22.3)/low sat 76%--? 22/20cm bipap  Titration study in epic    BT:11p, 12a off day  SL: 30-60min  Disruptions: nocturia 1-2x  Sleep quality: unrefreshing     FAMILY HISTORY: No known sleep disorders. Parents snore  SOCIAL HISTORY: Rare etoh    REVIEW OF SYSTEMS:  Sleep related symptoms as per HPI; stable wgt,  occasional sinus congestion; focus better (concerta qd).   Otherwise, a balance of 10 systems reviewed is negative        PHYSICAL EXAM:   /84   Pulse 86   Ht 5' 10" (1.778 m)   Wt 98.1 kg (216 lb 4.3 oz)   BMI 31.03 kg/m²   GENERAL: W/D,obese body habitus, well groomed         ASSESSMENT:     Obstructive sleep apnea (AJ), mild,previously intolerable to bipap high pressure settings. 2009 switched to autobipap with persistent use of therapy, but limited. Mouth leaks potential contributing factor. Ready to resume consistent use of PAP therapy for improvement of snoring, EDS, disrupted sleep. Needs new supplies/eligible for new machine. 2/16/17: Had titration study and PAP fixed effective for side sleep, unknown for supine REM. Apap setup planned today 4/10/18: adherent with apap, but limited recently, needs regular monitoring to stay with use/habit. Sleep onset issues/anxiety affecting use and poor sleep habits 1/15/19: continued limited adherence, falling asleep watchingtv w/o mask on an issue, hard to relax bedtime. Very motivated to do better, get better sleep and to feel less tired.   He has medical comorbidities of obesity, hypertension (optimal today), DM2 " (well controlled), hypothyroidism.  Insomnia  Anxiety    PLAN:   1. APAP 10-20cm continue to improve mask on time/use. RTC 6-wks monitoring.  Supplies via [facility]   2. Discussed health benefits of therapy and potential ramifications of untreated AJ, including stroke, heart disease, HTN.    3 Encouraged continued weight loss efforts for potential improvement of AJ and overall health benefits, working with health  regularly. RESUME gym  4. Discussed importance of relaxation activities evening, avoid tv watching, provided sleep hygiene again. BEGIN trazadone 50mg 1-2 qhs  5. Continue to see PCP re: HTN mgt and DM2

## 2019-01-18 ENCOUNTER — OFFICE VISIT (OUTPATIENT)
Dept: OPTOMETRY | Facility: CLINIC | Age: 54
End: 2019-01-18
Payer: COMMERCIAL

## 2019-01-18 ENCOUNTER — CLINICAL SUPPORT (OUTPATIENT)
Dept: INTERNAL MEDICINE | Facility: CLINIC | Age: 54
End: 2019-01-18
Payer: COMMERCIAL

## 2019-01-18 VITALS — WEIGHT: 219.38 LBS | BODY MASS INDEX: 31.47 KG/M2

## 2019-01-18 DIAGNOSIS — H52.203 MYOPIA WITH ASTIGMATISM AND PRESBYOPIA, BILATERAL: ICD-10-CM

## 2019-01-18 DIAGNOSIS — Q14.1 CONGENITAL HYPERTROPHY OF RETINAL PIGMENT EPITHELIUM: ICD-10-CM

## 2019-01-18 DIAGNOSIS — H52.13 MYOPIA WITH ASTIGMATISM AND PRESBYOPIA, BILATERAL: ICD-10-CM

## 2019-01-18 DIAGNOSIS — E11.9 TYPE 2 DIABETES MELLITUS WITHOUT RETINOPATHY: Primary | ICD-10-CM

## 2019-01-18 DIAGNOSIS — H52.4 MYOPIA WITH ASTIGMATISM AND PRESBYOPIA, BILATERAL: ICD-10-CM

## 2019-01-18 PROCEDURE — 99999 PR PBB SHADOW E&M-EST. PATIENT-LVL I: CPT | Mod: PBBFAC,,,

## 2019-01-18 PROCEDURE — 99999 PR PBB SHADOW E&M-EST. PATIENT-LVL III: CPT | Mod: PBBFAC,,, | Performed by: OPTOMETRIST

## 2019-01-18 PROCEDURE — 99999 PR PBB SHADOW E&M-EST. PATIENT-LVL I: ICD-10-PCS | Mod: PBBFAC,,,

## 2019-01-18 PROCEDURE — 92015 PR REFRACTION: ICD-10-PCS | Mod: S$GLB,,, | Performed by: OPTOMETRIST

## 2019-01-18 PROCEDURE — 92014 COMPRE OPH EXAM EST PT 1/>: CPT | Mod: S$GLB,,, | Performed by: OPTOMETRIST

## 2019-01-18 PROCEDURE — 92014 PR EYE EXAM, EST PATIENT,COMPREHESV: ICD-10-PCS | Mod: S$GLB,,, | Performed by: OPTOMETRIST

## 2019-01-18 PROCEDURE — 99999 PR PBB SHADOW E&M-EST. PATIENT-LVL III: ICD-10-PCS | Mod: PBBFAC,,, | Performed by: OPTOMETRIST

## 2019-01-18 PROCEDURE — 92015 DETERMINE REFRACTIVE STATE: CPT | Mod: S$GLB,,, | Performed by: OPTOMETRIST

## 2019-01-18 NOTE — PROGRESS NOTES
HPI     Mr. Wily Rodrigues is here for his annual diabetic eye exam.    Patient complains of distance vision getting harder to see at night with   glasses (worn full-time).    Would patient like a refraction today? yes    (-)drops  (-)flashes  (-)floaters  (-)diplopia    (+)Diabetes   Hemoglobin A1C       Date                     Value               Ref Range             Status           01/04/2019               6.1 (H)             4.0 - 5.6 %         Final  08/07/2018               5.9 (H)             4.0 - 5.6 %         Final  04/10/2018               5.6                 4.0 - 5.6 %         Final    OCULAR HISTORY  Last Eye Exam: 12/15/17 with Dr. Barger  (-)eye surgery   CHRPE OS  High myopia OU  Dry eyes    FAMILY HISTORY  (+)Glaucoma: father        Last edited by Ariadna Barger, OD on 1/18/2019  2:26 PM. (History)            Assessment /Plan     For exam results, see Encounter Report.    Type 2 diabetes mellitus without retinopathy   No retinopathy noted OU. Monitor with yearly DFE.     Myopia with astigmatism and presbyopia, bilateral   Increase OU. New glasses prescription released, adaptation expected.     Pt had LASIK consult with non-[facility] ophthalmologist in [place]; may consider clear lens exchange in a few years.  Eyeglass Final Rx     Eyeglass Final Rx       Sphere Cylinder Axis Add    Right -13.75 +1.00 110 +2.25    Left -14.25 +1.00 080 +2.25    Expiration Date:  1/19/2020    Comments:  Consider high-index.  Distance PD: 64  Near PD: 58            Congenital hypertrophy of retinal pigment epithelium   OS. Stable. Monitor.      RTC 1 year

## 2019-01-18 NOTE — PROGRESS NOTES
Health  Follow-Up Note   [x] Office  [] Phone  Notes from previous session reviewed.   [x] Previous Session Goals unchanged.   [] Patient/Caregiver Change in Goals.  Goals added or changed by Patient/Caregiver since program participation:  1. Get back on track       Additional/Changed support that patient/caregiver has experienced/sought?  (Indicate readiness, support from family, friends, others, community groups, etc)  1.  Friends    Additional/Changed obstacles that could prevent patient/caregiver from reaching their goals?  1.  Holidays  2. Bad weather    Feedback provided:  1.  Praised for continued effort    Diagnostic values/Desriptors for follow-up as needed for chronic condition(s)   Weight: 99.5 kg 219.36 lb     Interventions:   1. Health  listened reflectively, validated thoughts and feelings, offered support and encouragement.   2. Allowed patient to express themselves in a non-biased atmosphere.  3. Health  assisted pt to problem-solve obstacles such as being in a challenging environment and dealing with these challenges.   4. Motivational Interviewed interventions utilized (OARS).   5. Patient responded favorably to interventions and remained actively engaged in the session.   6. Health  will remain available and connected for patient by phone and/or office visits.   7. Positive reinforcement, emotional support and encouragement provided.   8. Focused Education: MI    Plan:  [x] Pt will work on goals as stated above.   [x] Pt will contact Health  for any questions, concerns or needs.  [x] Pt will follow up with Health  in office on  2/15/19 at 700.  [] Pt will follow up with Health  on phone in:        [x] Health  will remain available.   [] Health  will contact patient by phone in:        [] Health  will consult:        [] Health  will inform Provider via EPIC messaging.     Impression:  1. Behavior is consistent with Action Stage of Change.   2.  Participation level:       [x] Receptive      [x] Interactive      [] Guarded and Resistant      [x] Self Motivated      [] Refused/Declined to participate   3. [x] Pt voiced understanding of all information presented.       [] Pt voiced needing further information/education. This will be arranged.       [x] Pt would benefit from further education/information as identified by this health . This will be arranged.     Laura Burch RN HC

## 2019-02-05 ENCOUNTER — PATIENT MESSAGE (OUTPATIENT)
Dept: FAMILY MEDICINE | Facility: CLINIC | Age: 54
End: 2019-02-05

## 2019-02-05 RX ORDER — METHYLPREDNISOLONE 4 MG/1
TABLET ORAL
Qty: 1 PACKAGE | Refills: 0 | Status: SHIPPED | OUTPATIENT
Start: 2019-02-05 | End: 2019-02-26

## 2019-02-15 ENCOUNTER — CLINICAL SUPPORT (OUTPATIENT)
Dept: INTERNAL MEDICINE | Facility: CLINIC | Age: 54
End: 2019-02-15

## 2019-02-15 VITALS — BODY MASS INDEX: 31.13 KG/M2 | WEIGHT: 216.94 LBS

## 2019-02-15 NOTE — PROGRESS NOTES
Health  Follow-Up Note   [x] Office  [] Phone  Notes from previous session reviewed.   [x] Previous Session Goals unchanged.   [] Patient/Caregiver Change in Goals.  Goals added or changed by Patient/Caregiver since program participation:  1. Continue same plan  2. Try IF       Additional/Changed support that patient/caregiver has experienced/sought?  (Indicate readiness, support from family, friends, others, community groups, etc)  1.  Friends    Additional/Changed obstacles that could prevent patient/caregiver from reaching their goals?  1.  Laid off from work, job hunting    Feedback provided:  1.  Praised for good job down 2.43 lb     Diagnostic values/Desriptors for follow-up as needed for chronic condition(s)   Weight: 98.4 kg 216.93 lb     Interventions:   1. Health  listened reflectively, validated thoughts and feelings, offered support and encouragement.   2. Allowed patient to express themselves in a non-biased atmosphere.  3. Health  assisted pt to problem-solve obstacles such as being in a challenging environment and dealing with these challenges.   4. Motivational Interviewed interventions utilized (OARS).   5. Patient responded favorably to interventions and remained actively engaged in the session.   6. Health  will remain available and connected for patient by phone and/or office visits.   7. Positive reinforcement, emotional support and encouragement provided.   8. Focused Education: MI    Plan:  [x] Pt will work on goals as stated above.   [x] Pt will contact Health  for any questions, concerns or needs.  [x] Pt will follow up with Health  in office on  2/22/19 at 0930.  [] Pt will follow up with Health  on phone in:        [x] Health  will remain available.   [] Health  will contact patient by phone in:        [] Health  will consult:        [] Health  will inform Provider via EPIC messaging.     Impression:  1. Behavior is consistent with  Action Stage of Change.   2. Participation level:       [x] Receptive      [x] Interactive      [] Guarded and Resistant      [x] Self Motivated      [] Refused/Declined to participate   3. [x] Pt voiced understanding of all information presented.       [] Pt voiced needing further information/education. This will be arranged.       [x] Pt would benefit from further education/information as identified by this health . This will be arranged.     Laura Burch RN HC

## 2019-02-22 ENCOUNTER — CLINICAL SUPPORT (OUTPATIENT)
Dept: INTERNAL MEDICINE | Facility: CLINIC | Age: 54
End: 2019-02-22

## 2019-02-22 NOTE — PROGRESS NOTES
Health  Follow-Up Note   [x] Office  [] Phone  Notes from previous session reviewed.   [x] Previous Session Goals unchanged.   [] Patient/Caregiver Change in Goals.  Goals added or changed by Patient/Caregiver since program participation:  1. Continue same plan       Additional/Changed support that patient/caregiver has experienced/sought?  (Indicate readiness, support from family, friends, others, community groups, etc)  1.  Friends    Additional/Changed obstacles that could prevent patient/caregiver from reaching their goals?  1.  Birthday Party/ [employer] parade    Feedback provided:  1.  Praised for great job down 2.2 lb in 1 week    Diagnostic values/Desriptors for follow-up as needed for chronic condition(s)   Weight: 97.6 kg 214.72 lb     Interventions:   1. Health  listened reflectively, validated thoughts and feelings, offered support and encouragement.   2. Allowed patient to express themselves in a non-biased atmosphere.  3. Health  assisted pt to problem-solve obstacles such as being in a challenging environment and dealing with these challenges.   4. Motivational Interviewed interventions utilized (OARS).   5. Patient responded favorably to interventions and remained actively engaged in the session.   6. Health  will remain available and connected for patient by phone and/or office visits.   7. Positive reinforcement, emotional support and encouragement provided.   8. Focused Education: MI    Plan:  [x] Pt will work on goals as stated above.   [x] Pt will contact Health  for any questions, concerns or needs.  [x] Pt will follow up with Health  in office on  2/28/19 at 700.  [] Pt will follow up with Health  on phone in:        [x] Health  will remain available.   [] Health  will contact patient by phone in:        [] Health  will consult:        [] Health  will inform Provider via EPIC messaging.     Impression:  1. Behavior is consistent with  Action Stage of Change.   2. Participation level:       [x] Receptive      [x] Interactive      [] Guarded and Resistant      [x] Self Motivated      [] Refused/Declined to participate   3. [x] Pt voiced understanding of all information presented.       [] Pt voiced needing further information/education. This will be arranged.       [x] Pt would benefit from further education/information as identified by this health . This will be arranged.     Laura Burch RN HC

## 2019-03-21 ENCOUNTER — PATIENT OUTREACH (OUTPATIENT)
Dept: ADMINISTRATIVE | Facility: HOSPITAL | Age: 54
End: 2019-03-21

## 2019-03-21 NOTE — PROGRESS NOTES
Dear Wily Rodrigues        [facility] is committed to your overall health and would like to ensure that you are up to date on all of your health maintenance testing. Our records indicate that you are overdue for your colorectal cancer screening. After reviewing your chart, it has been documented that a FIT KIT (colorectal cancer screening kit) was given at a clinic visit or mailed to you, but the lab has yet to receive the kit so that we may update your chart. This is a friendly reminder to complete this test (the instructions will tell you how) and then return your sample in the postage-paid return envelope within 24 hours of collection or return to ANY [facility] Facility . Please remember to put the collection date on your sample!    If your test results are negative, you won't need testing again for another year. If results show you need more testing, we will call you with next steps.  Also, if you have misplaced the FITKIT let us know and we will mail you another one.                                             OR    FEEL FREE TO CONTACT [phone] TO SCHEDULE COLONOSCOPY.         Areli Kramer MD and your [facility] Team

## 2019-03-25 ENCOUNTER — PATIENT MESSAGE (OUTPATIENT)
Dept: FAMILY MEDICINE | Facility: CLINIC | Age: 54
End: 2019-03-25

## 2019-03-25 RX ORDER — LISINOPRIL 40 MG/1
40 TABLET ORAL DAILY
Qty: 90 TABLET | Refills: 3 | Status: SHIPPED | OUTPATIENT
Start: 2019-03-25 | End: 2020-02-08

## 2019-05-31 ENCOUNTER — PATIENT MESSAGE (OUTPATIENT)
Dept: FAMILY MEDICINE | Facility: CLINIC | Age: 54
End: 2019-05-31

## 2019-06-01 ENCOUNTER — OFFICE VISIT (OUTPATIENT)
Dept: FAMILY MEDICINE | Facility: CLINIC | Age: 54
End: 2019-06-01
Attending: FAMILY MEDICINE
Payer: MEDICAID

## 2019-06-01 VITALS
HEIGHT: 70 IN | OXYGEN SATURATION: 98 % | WEIGHT: 212.88 LBS | RESPIRATION RATE: 16 BRPM | SYSTOLIC BLOOD PRESSURE: 124 MMHG | BODY MASS INDEX: 30.48 KG/M2 | HEART RATE: 74 BPM | DIASTOLIC BLOOD PRESSURE: 84 MMHG

## 2019-06-01 DIAGNOSIS — I10 HTN (HYPERTENSION), BENIGN: Primary | ICD-10-CM

## 2019-06-01 LAB
ALBUMIN/CREAT UR: 11.5 UG/MG (ref 0–30)
CREAT UR-MCNC: 165 MG/DL (ref 23–375)
MICROALBUMIN UR DL<=1MG/L-MCNC: 19 UG/ML

## 2019-06-01 PROCEDURE — 99214 OFFICE O/P EST MOD 30 MIN: CPT | Mod: S$PBB,,, | Performed by: FAMILY MEDICINE

## 2019-06-01 PROCEDURE — 99214 PR OFFICE/OUTPT VISIT, EST, LEVL IV, 30-39 MIN: ICD-10-PCS | Mod: S$PBB,,, | Performed by: FAMILY MEDICINE

## 2019-06-01 PROCEDURE — 99999 PR PBB SHADOW E&M-EST. PATIENT-LVL IV: CPT | Mod: PBBFAC,,, | Performed by: FAMILY MEDICINE

## 2019-06-01 PROCEDURE — 82043 UR ALBUMIN QUANTITATIVE: CPT

## 2019-06-01 PROCEDURE — 99214 OFFICE O/P EST MOD 30 MIN: CPT | Mod: PBBFAC,PO,25 | Performed by: FAMILY MEDICINE

## 2019-06-01 PROCEDURE — 99999 PR PBB SHADOW E&M-EST. PATIENT-LVL IV: ICD-10-PCS | Mod: PBBFAC,,, | Performed by: FAMILY MEDICINE

## 2019-06-01 PROCEDURE — 96372 THER/PROPH/DIAG INJ SC/IM: CPT | Mod: PBBFAC,PO

## 2019-06-01 RX ORDER — TRAZODONE HYDROCHLORIDE 50 MG/1
TABLET ORAL
Refills: 5 | COMMUNITY
Start: 2019-05-18 | End: 2019-09-12

## 2019-06-01 RX ORDER — METHYLPHENIDATE HYDROCHLORIDE 36 MG/1
36 TABLET ORAL DAILY
Qty: 30 TABLET | Refills: 0 | Status: SHIPPED | OUTPATIENT
Start: 2019-06-01

## 2019-06-01 RX ORDER — HYDROXYZINE HYDROCHLORIDE 50 MG/1
50 TABLET, FILM COATED ORAL EVERY 8 HOURS PRN
Refills: 0 | COMMUNITY
Start: 2019-04-26 | End: 2019-09-12

## 2019-06-01 RX ORDER — METHYLPREDNISOLONE 4 MG/1
TABLET ORAL
Qty: 1 PACKAGE | Refills: 0 | Status: SHIPPED | OUTPATIENT
Start: 2019-06-01 | End: 2019-06-22

## 2019-06-01 RX ORDER — METHYLPREDNISOLONE ACETATE 40 MG/ML
40 INJECTION, SUSPENSION INTRA-ARTICULAR; INTRALESIONAL; INTRAMUSCULAR; SOFT TISSUE ONCE
Status: COMPLETED | OUTPATIENT
Start: 2019-06-01 | End: 2019-06-01

## 2019-06-01 RX ADMIN — METHYLPREDNISOLONE ACETATE 40 MG: 40 INJECTION, SUSPENSION INTRA-ARTICULAR; INTRALESIONAL; INTRAMUSCULAR; SOFT TISSUE at 10:06

## 2019-06-01 NOTE — PATIENT INSTRUCTIONS
Wily,     We are always striving for excellence. Should you receive a patient experience survey in the mail, we would appreciate if you would take a few moments to give us your feedback. These surveys let us know our strengths as well as areas of opportunity for improvement to better serve you.    Thank you for your time,  Rebeca Daniel LPN    Your test results will be communicated to you via : [facility], Telephone or Letter.   If you have not received your test results in one week, please contact the clinic at [phone].

## 2019-06-06 NOTE — PROGRESS NOTES
"Subjective:       Patient ID: Wily Rodrigues is a 54 y.o. male.    Chief Complaint: Hypertension    HPI   Pt is here for follow up of htn stable on ace no sob/cp no cough   bp fine today   Pt has dm fbs in the low 100's last hgb a1c in January acceptable no polyuria/dipsia/phagia   Review of Systems   Constitutional: Negative for chills, fatigue and fever.   Respiratory: Negative for cough, chest tightness and shortness of breath.    Cardiovascular: Negative for chest pain and palpitations.   Gastrointestinal: Negative for abdominal distention and abdominal pain.       Objective:      Physical Exam   Constitutional: He appears well-developed and well-nourished. No distress.   Cardiovascular: Normal rate and regular rhythm. Exam reveals no gallop.   Pulmonary/Chest: Effort normal and breath sounds normal. No stridor. No respiratory distress.   Abdominal: Soft. Bowel sounds are normal. He exhibits no distension.     labs discussed with pt   Assessment:       1. HTN (hypertension), benign      2. diabetes  Plan:     orders declined  meds refilled  Low salt low fat ada diet  Graded exercise  Increase water intake  Avoid caffeine  rtc for annual        "This note will not be shared with the patient."   "

## 2019-06-30 ENCOUNTER — PATIENT MESSAGE (OUTPATIENT)
Dept: FAMILY MEDICINE | Facility: CLINIC | Age: 54
End: 2019-06-30

## 2019-07-19 DIAGNOSIS — E11.9 TYPE 2 DIABETES MELLITUS WITHOUT COMPLICATION: ICD-10-CM

## 2019-09-05 RX ORDER — ATORVASTATIN CALCIUM 20 MG/1
TABLET, FILM COATED ORAL
Qty: 90 TABLET | Refills: 3 | Status: SHIPPED | OUTPATIENT
Start: 2019-09-05 | End: 2020-08-01

## 2019-09-09 ENCOUNTER — TELEPHONE (OUTPATIENT)
Dept: FAMILY MEDICINE | Facility: CLINIC | Age: 54
End: 2019-09-09

## 2019-09-09 NOTE — TELEPHONE ENCOUNTER
----- Message from Mercedez Dick sent at 9/9/2019  8:42 AM CDT -----  Contact: pt [phone]  Pt would like to schedule health  visit. Please advise

## 2019-09-12 ENCOUNTER — LAB VISIT (OUTPATIENT)
Dept: LAB | Facility: HOSPITAL | Age: 54
End: 2019-09-12
Attending: FAMILY MEDICINE
Payer: COMMERCIAL

## 2019-09-12 ENCOUNTER — OFFICE VISIT (OUTPATIENT)
Dept: FAMILY MEDICINE | Facility: CLINIC | Age: 54
End: 2019-09-12
Attending: FAMILY MEDICINE
Payer: COMMERCIAL

## 2019-09-12 VITALS
BODY MASS INDEX: 31.64 KG/M2 | OXYGEN SATURATION: 96 % | SYSTOLIC BLOOD PRESSURE: 145 MMHG | HEART RATE: 57 BPM | WEIGHT: 221 LBS | HEIGHT: 70 IN | DIASTOLIC BLOOD PRESSURE: 75 MMHG

## 2019-09-12 DIAGNOSIS — R73.03 PRE-DIABETES: ICD-10-CM

## 2019-09-12 DIAGNOSIS — E78.2 MIXED HYPERLIPIDEMIA: ICD-10-CM

## 2019-09-12 DIAGNOSIS — E11.9 TYPE 2 DIABETES MELLITUS WITHOUT COMPLICATION: ICD-10-CM

## 2019-09-12 DIAGNOSIS — Z12.11 SCREEN FOR COLON CANCER: ICD-10-CM

## 2019-09-12 DIAGNOSIS — I10 HTN (HYPERTENSION), BENIGN: ICD-10-CM

## 2019-09-12 DIAGNOSIS — R73.03 PRE-DIABETES: Primary | ICD-10-CM

## 2019-09-12 LAB
ALBUMIN SERPL BCP-MCNC: 4.2 G/DL (ref 3.5–5.2)
ALP SERPL-CCNC: 87 U/L (ref 55–135)
ALT SERPL W/O P-5'-P-CCNC: 40 U/L (ref 10–44)
ANION GAP SERPL CALC-SCNC: 12 MMOL/L (ref 8–16)
AST SERPL-CCNC: 21 U/L (ref 10–40)
BILIRUB SERPL-MCNC: 0.4 MG/DL (ref 0.1–1)
BUN SERPL-MCNC: 16 MG/DL (ref 6–20)
CALCIUM SERPL-MCNC: 9.5 MG/DL (ref 8.7–10.5)
CHLORIDE SERPL-SCNC: 104 MMOL/L (ref 95–110)
CHOLEST SERPL-MCNC: 185 MG/DL (ref 120–199)
CHOLEST/HDLC SERPL: 5 {RATIO} (ref 2–5)
CO2 SERPL-SCNC: 23 MMOL/L (ref 23–29)
CREAT SERPL-MCNC: 0.8 MG/DL (ref 0.5–1.4)
EST. GFR  (AFRICAN AMERICAN): >60 ML/MIN/1.73 M^2
EST. GFR  (NON AFRICAN AMERICAN): >60 ML/MIN/1.73 M^2
ESTIMATED AVG GLUCOSE: 148 MG/DL (ref 68–131)
GLUCOSE SERPL-MCNC: 160 MG/DL (ref 70–110)
HBA1C MFR BLD HPLC: 6.8 % (ref 4–5.6)
HDLC SERPL-MCNC: 37 MG/DL (ref 40–75)
HDLC SERPL: 20 % (ref 20–50)
LDLC SERPL CALC-MCNC: 115.2 MG/DL (ref 63–159)
NONHDLC SERPL-MCNC: 148 MG/DL
POTASSIUM SERPL-SCNC: 4.4 MMOL/L (ref 3.5–5.1)
PROT SERPL-MCNC: 7 G/DL (ref 6–8.4)
SODIUM SERPL-SCNC: 139 MMOL/L (ref 136–145)
TRIGL SERPL-MCNC: 164 MG/DL (ref 30–150)

## 2019-09-12 PROCEDURE — 3078F PR MOST RECENT DIASTOLIC BLOOD PRESSURE < 80 MM HG: ICD-10-PCS | Mod: CPTII,S$GLB,, | Performed by: FAMILY MEDICINE

## 2019-09-12 PROCEDURE — 99214 OFFICE O/P EST MOD 30 MIN: CPT | Mod: 25,S$GLB,, | Performed by: FAMILY MEDICINE

## 2019-09-12 PROCEDURE — 83036 HEMOGLOBIN GLYCOSYLATED A1C: CPT

## 2019-09-12 PROCEDURE — 99999 PR PBB SHADOW E&M-EST. PATIENT-LVL III: ICD-10-PCS | Mod: PBBFAC,,, | Performed by: FAMILY MEDICINE

## 2019-09-12 PROCEDURE — 3078F DIAST BP <80 MM HG: CPT | Mod: CPTII,S$GLB,, | Performed by: FAMILY MEDICINE

## 2019-09-12 PROCEDURE — 90686 IIV4 VACC NO PRSV 0.5 ML IM: CPT | Mod: S$GLB,,, | Performed by: FAMILY MEDICINE

## 2019-09-12 PROCEDURE — 90686 FLU VACCINE (QUAD) GREATER THAN OR EQUAL TO 3YO PRESERVATIVE FREE IM: ICD-10-PCS | Mod: S$GLB,,, | Performed by: FAMILY MEDICINE

## 2019-09-12 PROCEDURE — 3077F SYST BP >= 140 MM HG: CPT | Mod: CPTII,S$GLB,, | Performed by: FAMILY MEDICINE

## 2019-09-12 PROCEDURE — 3008F PR BODY MASS INDEX (BMI) DOCUMENTED: ICD-10-PCS | Mod: CPTII,S$GLB,, | Performed by: FAMILY MEDICINE

## 2019-09-12 PROCEDURE — 3077F PR MOST RECENT SYSTOLIC BLOOD PRESSURE >= 140 MM HG: ICD-10-PCS | Mod: CPTII,S$GLB,, | Performed by: FAMILY MEDICINE

## 2019-09-12 PROCEDURE — 90471 FLU VACCINE (QUAD) GREATER THAN OR EQUAL TO 3YO PRESERVATIVE FREE IM: ICD-10-PCS | Mod: S$GLB,,, | Performed by: FAMILY MEDICINE

## 2019-09-12 PROCEDURE — 36415 COLL VENOUS BLD VENIPUNCTURE: CPT | Mod: PO

## 2019-09-12 PROCEDURE — 3008F BODY MASS INDEX DOCD: CPT | Mod: CPTII,S$GLB,, | Performed by: FAMILY MEDICINE

## 2019-09-12 PROCEDURE — 99999 PR PBB SHADOW E&M-EST. PATIENT-LVL III: CPT | Mod: PBBFAC,,, | Performed by: FAMILY MEDICINE

## 2019-09-12 PROCEDURE — 80053 COMPREHEN METABOLIC PANEL: CPT

## 2019-09-12 PROCEDURE — 80061 LIPID PANEL: CPT

## 2019-09-12 PROCEDURE — 99214 PR OFFICE/OUTPT VISIT, EST, LEVL IV, 30-39 MIN: ICD-10-PCS | Mod: 25,S$GLB,, | Performed by: FAMILY MEDICINE

## 2019-09-12 PROCEDURE — 90471 IMMUNIZATION ADMIN: CPT | Mod: S$GLB,,, | Performed by: FAMILY MEDICINE

## 2019-09-12 NOTE — PATIENT INSTRUCTIONS
Wily,     We are always striving for excellence. Should you receive a patient experience survey electronically or by mail, we would appreciate if you would take a few moments to give us your feedback. These surveys let us know our strengths as well as areas of opportunity for improvement to better serve you.    Thank you for your time,  Nabila Banuelos LPN    Test results will be communicated to you via : [facility], Telephone or Letter.   If you have not received test results in one week, please contact the clinic at   [phone].

## 2019-09-12 NOTE — PROGRESS NOTES
FitKit was given to patient on 9/12/2019 7:34 AM       Patient was given Influenza IM in Left Deltoid  as per orders from MD. Aseptic tech used and pt tolerated well. Pt was monitored for 15 mins with no reaction noted.

## 2019-09-12 NOTE — PROGRESS NOTES
"Subjective:       Patient ID: Wily Rodrigues is a 54 y.o. male.    Chief Complaint: Diabetes    HPI   Pt is here for follow up of pre diabetes stable diet controlled he is starting a work out program and an ada diet  Pt has htn on ace bp elevated today on ace pt declines med change  Review of Systems   Constitutional: Negative for activity change, chills, fatigue, fever and unexpected weight change.   Eyes: Negative for discharge and visual disturbance.   Respiratory: Negative for cough, chest tightness and wheezing.    Cardiovascular: Negative for chest pain and palpitations.   Gastrointestinal: Negative for abdominal distention, abdominal pain, blood in stool, constipation, diarrhea and vomiting.   Endocrine: Negative for polydipsia and polyuria.   Genitourinary: Negative for urgency.       Objective:      Physical Exam   Constitutional: He appears well-developed and well-nourished. No distress.   Cardiovascular: Normal rate and regular rhythm. Exam reveals no gallop.   Pulmonary/Chest: Effort normal and breath sounds normal. No stridor. No respiratory distress.   Abdominal: Soft. Bowel sounds are normal. He exhibits no distension. There is no tenderness.     labs discussed with pt   Assessment:       1. Pre-diabetes    2. HTN (hypertension), benign    3. Screen for colon cancer    4. Mixed hyperlipidemia        Plan:     orders cmp lipid hgb a1c  Cont meds  Ada diet  Graded exercise  rtc 2 weeks bp check np  rtc quarterly        "This note will not be shared with the patient."   "

## 2019-09-13 ENCOUNTER — CLINICAL SUPPORT (OUTPATIENT)
Dept: INTERNAL MEDICINE | Facility: CLINIC | Age: 54
End: 2019-09-13
Payer: COMMERCIAL

## 2019-09-13 VITALS — WEIGHT: 223.31 LBS | BODY MASS INDEX: 32.04 KG/M2

## 2019-09-13 PROCEDURE — 99999 PR PBB SHADOW E&M-EST. PATIENT-LVL I: CPT | Mod: PBBFAC,,,

## 2019-09-13 PROCEDURE — 99999 PR PBB SHADOW E&M-EST. PATIENT-LVL I: ICD-10-PCS | Mod: PBBFAC,,,

## 2019-09-13 NOTE — PROGRESS NOTES
Health  Follow-Up Note   [x] Office  [] Phone  Notes from previous session reviewed.   [x] Previous Session Goals unchanged.   [] Patient/Caregiver Change in Goals.  Goals added or changed by Patient/Caregiver since program participation:  1. Get back on track  2. Start exercising    3. Start checking glucose     Additional/Changed support that patient/caregiver has experienced/sought?  (Indicate readiness, support from family, friends, others, community groups, etc)  1.  Friends    Additional/Changed obstacles that could prevent patient/caregiver from reaching their goals?  1.  Bad year, job change, lost insurance all coming back together now    Feedback provided:  1. Praised for continued effort and determination.    Diagnostic values/Desriptors for follow-up as needed for chronic condition(s)   Weight: 101.3 kg 223.33 lb gain 8.61 lbs   Blood Glucose: has not been checking will start again    Interventions:   1. Health  listened reflectively, validated thoughts and feelings, offered support and encouragement.   2. Allowed patient to express themselves in a non-biased atmosphere.  3. Health  assisted pt to problem-solve obstacles such as being in a challenging environment and dealing with these challenges.   4. Motivational Interviewed interventions utilized (OARS).   5. Patient responded favorably to interventions and remained actively engaged in the session.   6. Health  will remain available and connected for patient by phone and/or office visits.   7. Positive reinforcement, emotional support and encouragement provided.   8. Focused Education: MI    Plan:  [x] Pt will work on goals as stated above.   [x] Pt will contact Health  for any questions, concerns or needs.  [x] Pt will follow up with Health  in office on  10.4.19  [] Pt will follow up with Health  on phone in:        [x] Health  will remain available.   [] Health  will contact patient by phone in:        []  Health  will consult:        [] Health  will inform Provider via EPIC messaging.     Impression:  1. Behavior is consistent with Action Stage of Change.   2. Participation level:       [x] Receptive      [x] Interactive      [] Guarded and Resistant      [x] Self Motivated      [] Refused/Declined to participate   3. [x] Pt voiced understanding of all information presented.       [] Pt voiced needing further information/education. This will be arranged.       [x] Pt would benefit from further education/information as identified by this health . This will be arranged.     Laura Burch RN HC

## 2019-09-30 ENCOUNTER — OFFICE VISIT (OUTPATIENT)
Dept: FAMILY MEDICINE | Facility: CLINIC | Age: 54
End: 2019-09-30
Payer: COMMERCIAL

## 2019-09-30 VITALS
HEART RATE: 78 BPM | BODY MASS INDEX: 31.59 KG/M2 | DIASTOLIC BLOOD PRESSURE: 90 MMHG | OXYGEN SATURATION: 98 % | WEIGHT: 220.63 LBS | HEIGHT: 70 IN | SYSTOLIC BLOOD PRESSURE: 140 MMHG

## 2019-09-30 DIAGNOSIS — I10 HYPERTENSION, UNSPECIFIED TYPE: Primary | ICD-10-CM

## 2019-09-30 PROCEDURE — 3080F PR MOST RECENT DIASTOLIC BLOOD PRESSURE >= 90 MM HG: ICD-10-PCS | Mod: CPTII,S$GLB,, | Performed by: NURSE PRACTITIONER

## 2019-09-30 PROCEDURE — 99212 OFFICE O/P EST SF 10 MIN: CPT | Mod: S$GLB,,, | Performed by: NURSE PRACTITIONER

## 2019-09-30 PROCEDURE — 3080F DIAST BP >= 90 MM HG: CPT | Mod: CPTII,S$GLB,, | Performed by: NURSE PRACTITIONER

## 2019-09-30 PROCEDURE — 3008F PR BODY MASS INDEX (BMI) DOCUMENTED: ICD-10-PCS | Mod: CPTII,S$GLB,, | Performed by: NURSE PRACTITIONER

## 2019-09-30 PROCEDURE — 3077F SYST BP >= 140 MM HG: CPT | Mod: CPTII,S$GLB,, | Performed by: NURSE PRACTITIONER

## 2019-09-30 PROCEDURE — 3008F BODY MASS INDEX DOCD: CPT | Mod: CPTII,S$GLB,, | Performed by: NURSE PRACTITIONER

## 2019-09-30 PROCEDURE — 99999 PR PBB SHADOW E&M-EST. PATIENT-LVL III: ICD-10-PCS | Mod: PBBFAC,,, | Performed by: NURSE PRACTITIONER

## 2019-09-30 PROCEDURE — 3077F PR MOST RECENT SYSTOLIC BLOOD PRESSURE >= 140 MM HG: ICD-10-PCS | Mod: CPTII,S$GLB,, | Performed by: NURSE PRACTITIONER

## 2019-09-30 PROCEDURE — 99212 PR OFFICE/OUTPT VISIT, EST, LEVL II, 10-19 MIN: ICD-10-PCS | Mod: S$GLB,,, | Performed by: NURSE PRACTITIONER

## 2019-09-30 PROCEDURE — 99999 PR PBB SHADOW E&M-EST. PATIENT-LVL III: CPT | Mod: PBBFAC,,, | Performed by: NURSE PRACTITIONER

## 2019-09-30 RX ORDER — HYDROCHLOROTHIAZIDE 12.5 MG/1
12.5 TABLET ORAL DAILY
Qty: 30 TABLET | Refills: 1 | Status: SHIPPED | OUTPATIENT
Start: 2019-09-30 | End: 2019-10-24 | Stop reason: SDUPTHER

## 2019-09-30 NOTE — PROGRESS NOTES
Subjective:       Patient ID: Wily Rodrigues is a 54 y.o. male.    Chief Complaint: Hypertension (2 wks BP Check)    HPI     Patient presents today for 2 week follow up of BP.  No c/o cp/sob. No HA, dizziness or lightheadedness.  He is currently on lisinopril 40 mg. BP remains elevated today. He is exercising and eating a low salt diet.     Review of Systems    Pertinent info noted in HPI    Objective:      Vitals:    09/30/19 1147   BP: (!) 140/90   Pulse: 78     Physical Exam   Constitutional: He is oriented to person, place, and time. He appears well-developed and well-nourished.   HENT:   Head: Normocephalic and atraumatic.   Eyes: No scleral icterus.   Neck: No JVD present.   Cardiovascular: Normal rate, regular rhythm and normal heart sounds. Exam reveals no gallop.   No murmur heard.  Pulmonary/Chest: Effort normal and breath sounds normal. No respiratory distress.   Musculoskeletal: He exhibits no edema.   Neurological: He is alert and oriented to person, place, and time.   Psychiatric: He has a normal mood and affect. His behavior is normal. Thought content normal.         Assessment:       1. Hypertension, unspecified type        Plan:     Continue medications as prescribed  Add HCTZ 12.5 mg once daily  Continue low salt diet  Continue exercising     Follow up 1 month for BP and labs

## 2019-09-30 NOTE — PATIENT INSTRUCTIONS
Wily,     We are always striving for excellence. Should you receive a patient experience survey electronically or by mail, we would appreciate if you would take a few moments to give us your feedback. These surveys let us know our strengths as well as areas of opportunity for improvement to better serve you.    Thank you for your time,  Yossi Martinez MA

## 2019-10-11 ENCOUNTER — CLINICAL SUPPORT (OUTPATIENT)
Dept: INTERNAL MEDICINE | Facility: CLINIC | Age: 54
End: 2019-10-11
Payer: COMMERCIAL

## 2019-10-11 VITALS — WEIGHT: 220.25 LBS | SYSTOLIC BLOOD PRESSURE: 134 MMHG | DIASTOLIC BLOOD PRESSURE: 84 MMHG | BODY MASS INDEX: 31.6 KG/M2

## 2019-10-11 PROCEDURE — 99999 PR PBB SHADOW E&M-EST. PATIENT-LVL I: CPT | Mod: PBBFAC,,,

## 2019-10-11 PROCEDURE — 99999 PR PBB SHADOW E&M-EST. PATIENT-LVL I: ICD-10-PCS | Mod: PBBFAC,,,

## 2019-10-11 NOTE — PROGRESS NOTES
Health  Follow-Up Note   [x] Office  [] Phone  Notes from previous session reviewed.   [x] Previous Session Goals unchanged.   [] Patient/Caregiver Change in Goals.   Goals added or changed by Patient/Caregiver since program participation:  1. Getting back on track       Additional/Changed support that patient/caregiver has experienced/sought?  (Indicate readiness, support from family, friends, others, community groups, etc)  1.  Friends    Additional/Changed obstacles that could prevent patient/caregiver from reaching their goals?  1.  Eating out    Feedback provided:  1.  Praised for good job down 3.09  lbs    Diagnostic values/Desriptors for follow-up as needed for chronic condition(s)   Weight: 99.9 kg 220.24 lb     Interventions:   1. Health  listened reflectively, validated thoughts and feelings, offered support and encouragement.   2. Allowed patient to express themselves in a non-biased atmosphere.  3. Health  assisted pt to problem-solve obstacles such as being in a challenging environment and dealing with these challenges.   4. Motivational Interviewed interventions utilized (OARS).   5. Patient responded favorably to interventions and remained actively engaged in the session.   6. Health  will remain available and connected for patient by phone and/or office visits.   7. Positive reinforcement, emotional support and encouragement provided.   8. Focused Education: MI    Plan:  [x] Pt will work on goals as stated above.   [x] Pt will contact Health  for any questions, concerns or needs.  [x] Pt will follow up with Health  in office on  11.15.19  [] Pt will follow up with Health  on phone in:        [x] Health  will remain available.   [] Health  will contact patient by phone in:        [] Health  will consult:        [] Health  will inform Provider via EPIC messaging.     Impression:  1. Behavior is consistent with Action Stage of Change.   2.  Participation level:       [x] Receptive      [x] Interactive      [] Guarded and Resistant      [x] Self Motivated      [] Refused/Declined to participate   3. [x] Pt voiced understanding of all information presented.       [] Pt voiced needing further information/education. This will be arranged.       [x] Pt would benefit from further education/information as identified by this health . This will be arranged.     Laura Burch RN HC

## 2019-10-24 RX ORDER — HYDROCHLOROTHIAZIDE 12.5 MG/1
TABLET ORAL
Qty: 30 TABLET | Refills: 1 | Status: SHIPPED | OUTPATIENT
Start: 2019-10-24 | End: 2019-11-21 | Stop reason: SDUPTHER

## 2019-11-09 ENCOUNTER — NURSE TRIAGE (OUTPATIENT)
Dept: ADMINISTRATIVE | Facility: CLINIC | Age: 54
End: 2019-11-09

## 2019-11-09 NOTE — TELEPHONE ENCOUNTER
Patient reports started with a cough yesterday. Patient believes he has a fever and chills. Pt did not check temp, complains of weakness and sore throat. Pt unsure of flu shot.     Reason for Disposition   [1] SEVERE sore throat AND [2] present > 24 hours    Additional Information   Negative: Severe difficulty breathing (e.g., struggling for each breath, speaks in single words)   Negative: Sounds like a life-threatening emergency to the triager   Negative: Fever > 104 F (40 C)   Negative: [1] Difficulty breathing AND [2] not severe AND [3] not from stuffy nose (e.g., not relieved by cleaning out the nose)   Negative: Patient sounds very sick or weak to the triager   Negative: [1] Fever > 101 F (38.3 C) AND [2] age > 60   Negative: [1] Fever > 100.0 F (37.8 C) AND [2] bedridden (e.g., nursing home patient, CVA, chronic illness, recovering from surgery)   Negative: [1] Fever > 100.0 F (37.8 C) AND [2] diabetes mellitus or weak immune system (e.g., HIV positive, cancer chemo, splenectomy, chronic steroids)   Negative: Fever present > 3 days (72 hours)   Negative: [1] Fever returns after gone for over 24 hours AND [2] symptoms worse or not improved   Negative: [1] Sinus pain (not just congestion) AND [2] fever   Negative: Earache    Protocols used: COMMON COLD-A-

## 2019-11-11 ENCOUNTER — OFFICE VISIT (OUTPATIENT)
Dept: FAMILY MEDICINE | Facility: CLINIC | Age: 54
End: 2019-11-11
Attending: FAMILY MEDICINE
Payer: COMMERCIAL

## 2019-11-11 VITALS
TEMPERATURE: 99 F | WEIGHT: 221 LBS | DIASTOLIC BLOOD PRESSURE: 70 MMHG | BODY MASS INDEX: 31.64 KG/M2 | HEIGHT: 70 IN | OXYGEN SATURATION: 96 % | HEART RATE: 92 BPM | SYSTOLIC BLOOD PRESSURE: 124 MMHG

## 2019-11-11 DIAGNOSIS — B34.9 VIRAL SYNDROME: Primary | ICD-10-CM

## 2019-11-11 PROCEDURE — 3008F BODY MASS INDEX DOCD: CPT | Mod: CPTII,S$GLB,, | Performed by: FAMILY MEDICINE

## 2019-11-11 PROCEDURE — 3074F SYST BP LT 130 MM HG: CPT | Mod: CPTII,S$GLB,, | Performed by: FAMILY MEDICINE

## 2019-11-11 PROCEDURE — 99213 PR OFFICE/OUTPT VISIT, EST, LEVL III, 20-29 MIN: ICD-10-PCS | Mod: 25,S$GLB,, | Performed by: FAMILY MEDICINE

## 2019-11-11 PROCEDURE — 96372 PR INJECTION,THERAP/PROPH/DIAG2ST, IM OR SUBCUT: ICD-10-PCS | Mod: S$GLB,,, | Performed by: FAMILY MEDICINE

## 2019-11-11 PROCEDURE — 99213 OFFICE O/P EST LOW 20 MIN: CPT | Mod: 25,S$GLB,, | Performed by: FAMILY MEDICINE

## 2019-11-11 PROCEDURE — 99999 PR PBB SHADOW E&M-EST. PATIENT-LVL III: CPT | Mod: PBBFAC,,, | Performed by: FAMILY MEDICINE

## 2019-11-11 PROCEDURE — 3008F PR BODY MASS INDEX (BMI) DOCUMENTED: ICD-10-PCS | Mod: CPTII,S$GLB,, | Performed by: FAMILY MEDICINE

## 2019-11-11 PROCEDURE — 99999 PR PBB SHADOW E&M-EST. PATIENT-LVL III: ICD-10-PCS | Mod: PBBFAC,,, | Performed by: FAMILY MEDICINE

## 2019-11-11 PROCEDURE — 3074F PR MOST RECENT SYSTOLIC BLOOD PRESSURE < 130 MM HG: ICD-10-PCS | Mod: CPTII,S$GLB,, | Performed by: FAMILY MEDICINE

## 2019-11-11 PROCEDURE — 96372 THER/PROPH/DIAG INJ SC/IM: CPT | Mod: S$GLB,,, | Performed by: FAMILY MEDICINE

## 2019-11-11 PROCEDURE — 3078F DIAST BP <80 MM HG: CPT | Mod: CPTII,S$GLB,, | Performed by: FAMILY MEDICINE

## 2019-11-11 PROCEDURE — 3078F PR MOST RECENT DIASTOLIC BLOOD PRESSURE < 80 MM HG: ICD-10-PCS | Mod: CPTII,S$GLB,, | Performed by: FAMILY MEDICINE

## 2019-11-11 RX ORDER — METHYLPREDNISOLONE ACETATE 40 MG/ML
40 INJECTION, SUSPENSION INTRA-ARTICULAR; INTRALESIONAL; INTRAMUSCULAR; SOFT TISSUE
Status: COMPLETED | OUTPATIENT
Start: 2019-11-11 | End: 2019-11-11

## 2019-11-11 RX ADMIN — METHYLPREDNISOLONE ACETATE 40 MG: 40 INJECTION, SUSPENSION INTRA-ARTICULAR; INTRALESIONAL; INTRAMUSCULAR; SOFT TISSUE at 10:11

## 2019-11-11 NOTE — PROGRESS NOTES
"Subjective:       Patient ID: Wily Rodrigues is a 54 y.o. male.    Chief Complaint: URI    HPI   Pt is here for c/o several days on fatigue fever/chills 2 days ago cough with deep inspiration feeling better today  No sob/cp no sore throat no headache  Review of Systems   Constitutional: Positive for fatigue. Negative for chills and fever.   HENT: Positive for congestion, postnasal drip, rhinorrhea and sinus pressure. Negative for ear pain, sore throat and trouble swallowing.    Respiratory: Positive for cough. Negative for chest tightness and shortness of breath.    Cardiovascular: Negative for chest pain and palpitations.   Gastrointestinal: Negative for abdominal distention and abdominal pain.       Objective:      Physical Exam   Constitutional: He appears well-developed and well-nourished. No distress.   HENT:   Head: Normocephalic and atraumatic.   Mouth/Throat: Oropharynx is clear and moist. No oropharyngeal exudate.   Tm pearly bilaterally nares patent bilaterally with thin whitish mucus no facial tenderness    Cardiovascular: Normal rate and regular rhythm. Exam reveals no gallop.   Pulmonary/Chest: Effort normal and breath sounds normal. No stridor. No respiratory distress.   Abdominal: Soft. Bowel sounds are normal. He exhibits no distension.       Assessment:       1. Viral syndrome        Plan:     orders flu a/b  Orders depomedrol  Cont  meds  Rest  Increase fluids  rtc prn       "This note will not be shared with the patient."   "

## 2019-11-11 NOTE — PROGRESS NOTES
After attaining consent, in per orders of Dr. Kramer , injection of Depo Medrol  LOT [id number] exp. 04/2020 given in Right Ventrogluteal by Nabila Banuelos. Patient tolerated well and band aid applied. Pt. Instructed to remain in clinic for 15 mins. afterwards, and to report any adverse reactions to me immediately .

## 2019-11-13 ENCOUNTER — TELEPHONE (OUTPATIENT)
Dept: FAMILY MEDICINE | Facility: CLINIC | Age: 54
End: 2019-11-13

## 2019-11-13 ENCOUNTER — PATIENT MESSAGE (OUTPATIENT)
Dept: FAMILY MEDICINE | Facility: CLINIC | Age: 54
End: 2019-11-13

## 2019-11-13 RX ORDER — GUAIFENESIN AND PSEUDOEPHEDRINE HCL 1200; 120 MG/1; MG/1
TABLET, EXTENDED RELEASE ORAL
Qty: 240 EACH | Refills: 0 | Status: SHIPPED | OUTPATIENT
Start: 2019-11-13 | End: 2022-11-05

## 2019-11-13 RX ORDER — AZITHROMYCIN 250 MG/1
TABLET, FILM COATED ORAL
Qty: 6 TABLET | Refills: 0 | Status: SHIPPED | OUTPATIENT
Start: 2019-11-13 | End: 2019-11-18

## 2019-11-13 NOTE — TELEPHONE ENCOUNTER
----- Message from Areli Kramer MD sent at 11/13/2019  2:26 PM CST -----  Please find out if pt wants to  his cough syrup script or send in the mail.  It wont go be eprescribe

## 2019-11-15 ENCOUNTER — PATIENT MESSAGE (OUTPATIENT)
Dept: FAMILY MEDICINE | Facility: CLINIC | Age: 54
End: 2019-11-15

## 2019-11-21 RX ORDER — HYDROCHLOROTHIAZIDE 12.5 MG/1
12.5 TABLET ORAL DAILY
Qty: 90 TABLET | Refills: 3 | Status: SHIPPED | OUTPATIENT
Start: 2019-11-21 | End: 2021-12-01

## 2019-12-04 RX ORDER — LEVOTHYROXINE SODIUM 25 UG/1
25 TABLET ORAL
Qty: 90 TABLET | Refills: 3 | Status: SHIPPED | OUTPATIENT
Start: 2019-12-04 | End: 2021-01-11

## 2019-12-06 ENCOUNTER — CLINICAL SUPPORT (OUTPATIENT)
Dept: INTERNAL MEDICINE | Facility: CLINIC | Age: 54
End: 2019-12-06
Payer: COMMERCIAL

## 2019-12-06 VITALS — WEIGHT: 217.81 LBS | BODY MASS INDEX: 31.25 KG/M2

## 2019-12-06 NOTE — PROGRESS NOTES
Health  Follow-Up Note   [x] Office  [] Phone  Notes from previous session reviewed.   [x] Previous Session Goals unchanged.   [] Patient/Caregiver Change in Goals.  Goals added or changed by Patient/Caregiver since program participation:  1. Continue same plan    2. See  On 12.12.19 f/u      Additional/Changed support that patient/caregiver has experienced/sought?  (Indicate readiness, support from family, friends, others, community groups, etc)  1.  Friends    Additional/Changed obstacles that could prevent patient/caregiver from reaching their goals?  1.  Going out with friends   2. Out of town visit sister in [facility]/ Holiday with friends in [place]    Feedback provided:  1.  Praised for good job down 2.43 lbs.    Diagnostic values/Desriptors for follow-up as needed for chronic condition(s)   Weight: 98.8 kg 217.81 lb     Interventions:   1. Health  listened reflectively, validated thoughts and feelings, offered support and encouragement.   2. Allowed patient to express themselves in a non-biased atmosphere.  3. Health  assisted pt to problem-solve obstacles such as being in a challenging environment and dealing with these challenges.   4. Motivational Interviewed interventions utilized (OARS).   5. Patient responded favorably to interventions and remained actively engaged in the session.   6. Health  will remain available and connected for patient by phone and/or office visits.   7. Positive reinforcement, emotional support and encouragement provided.   8. Focused Education: MI    Plan:  [x] Pt will work on goals as stated above.   [x] Pt will contact Health  for any questions, concerns or needs.  [x] Pt will follow up with Health  in office on  1.3.20.  [] Pt will follow up with Health  on phone in:        [x] Health  will remain available.   [] Health  will contact patient by phone in:        [] Health  will consult:        [] Health  will inform  Provider via EPIC messaging.     Impression:  1. Behavior is consistent with Action Stage of Change.   2. Participation level:       [x] Receptive      [x] Interactive      [] Guarded and Resistant      [x] Self Motivated      [] Refused/Declined to participate   3. [x] Pt voiced understanding of all information presented.       [] Pt voiced needing further information/education. This will be arranged.       [x] Pt would benefit from further education/information as identified by this health . This will be arranged.     Laura Burch RN HC

## 2019-12-12 ENCOUNTER — OFFICE VISIT (OUTPATIENT)
Dept: FAMILY MEDICINE | Facility: CLINIC | Age: 54
End: 2019-12-12
Attending: FAMILY MEDICINE
Payer: COMMERCIAL

## 2019-12-12 VITALS
BODY MASS INDEX: 32.02 KG/M2 | HEIGHT: 70 IN | WEIGHT: 223.63 LBS | DIASTOLIC BLOOD PRESSURE: 80 MMHG | SYSTOLIC BLOOD PRESSURE: 145 MMHG | RESPIRATION RATE: 16 BRPM | HEART RATE: 75 BPM

## 2019-12-12 DIAGNOSIS — I10 ESSENTIAL HYPERTENSION: Primary | ICD-10-CM

## 2019-12-12 DIAGNOSIS — E03.9 HYPOTHYROIDISM (ACQUIRED): ICD-10-CM

## 2019-12-12 DIAGNOSIS — E78.2 MIXED HYPERLIPIDEMIA: ICD-10-CM

## 2019-12-12 PROCEDURE — 99214 OFFICE O/P EST MOD 30 MIN: CPT | Mod: S$GLB,,, | Performed by: FAMILY MEDICINE

## 2019-12-12 PROCEDURE — 3079F DIAST BP 80-89 MM HG: CPT | Mod: CPTII,S$GLB,, | Performed by: FAMILY MEDICINE

## 2019-12-12 PROCEDURE — 3008F BODY MASS INDEX DOCD: CPT | Mod: CPTII,S$GLB,, | Performed by: FAMILY MEDICINE

## 2019-12-12 PROCEDURE — 3008F PR BODY MASS INDEX (BMI) DOCUMENTED: ICD-10-PCS | Mod: CPTII,S$GLB,, | Performed by: FAMILY MEDICINE

## 2019-12-12 PROCEDURE — 99214 PR OFFICE/OUTPT VISIT, EST, LEVL IV, 30-39 MIN: ICD-10-PCS | Mod: S$GLB,,, | Performed by: FAMILY MEDICINE

## 2019-12-12 PROCEDURE — 99999 PR PBB SHADOW E&M-EST. PATIENT-LVL IV: ICD-10-PCS | Mod: PBBFAC,,, | Performed by: FAMILY MEDICINE

## 2019-12-12 PROCEDURE — 3079F PR MOST RECENT DIASTOLIC BLOOD PRESSURE 80-89 MM HG: ICD-10-PCS | Mod: CPTII,S$GLB,, | Performed by: FAMILY MEDICINE

## 2019-12-12 PROCEDURE — 3077F PR MOST RECENT SYSTOLIC BLOOD PRESSURE >= 140 MM HG: ICD-10-PCS | Mod: CPTII,S$GLB,, | Performed by: FAMILY MEDICINE

## 2019-12-12 PROCEDURE — 3077F SYST BP >= 140 MM HG: CPT | Mod: CPTII,S$GLB,, | Performed by: FAMILY MEDICINE

## 2019-12-12 PROCEDURE — 99999 PR PBB SHADOW E&M-EST. PATIENT-LVL IV: CPT | Mod: PBBFAC,,, | Performed by: FAMILY MEDICINE

## 2019-12-12 RX ORDER — BROMPHENIRAMINE MALEATE, PSEUDOEPHEDRINE HYDROCHLORIDE, AND DEXTROMETHORPHAN HYDROBROMIDE 2; 30; 10 MG/5ML; MG/5ML; MG/5ML
SYRUP ORAL
Refills: 0 | COMMUNITY
Start: 2019-11-15 | End: 2020-03-04

## 2019-12-12 NOTE — PROGRESS NOTES
"Subjective:       Patient ID: Wily Rodrigues is a 54 y.o. male.    Chief Complaint: Follow-up    HPI   Pt is here for follow up of htn pt is out of hctz bp mildly elevated today pt declines med change he will restart the hctz along with the ace  Pt has hypercholesterolemia on statin no muscle aches   Pt has hypothyroid on synthroid no weight changes   Review of Systems   Constitutional: Negative for activity change, chills, fatigue, fever and unexpected weight change.   Eyes: Negative for discharge and visual disturbance.   Respiratory: Negative for cough, chest tightness and wheezing.    Cardiovascular: Negative for chest pain and palpitations.   Gastrointestinal: Negative for abdominal distention, abdominal pain, blood in stool, constipation, diarrhea and vomiting.   Endocrine: Negative for cold intolerance and heat intolerance.       Objective:      Physical Exam   Constitutional: He appears well-developed and well-nourished. No distress.   Neck: Normal range of motion. Neck supple. No thyromegaly present.   Cardiovascular: Normal rate and regular rhythm. Exam reveals no gallop.   Pulmonary/Chest: Effort normal and breath sounds normal. No stridor. No respiratory distress.   Abdominal: Soft. Bowel sounds are normal. He exhibits no distension.     labs discussed with pt   Assessment:       1. Essential hypertension    2. Mixed hyperlipidemia      3. Hypothyroid   Plan:      orders declined  Cont meds  Restart hctz  Low salt low fat diet  rtc 1 month bp check     "This note will not be shared with the patient."   "

## 2019-12-12 NOTE — PATIENT INSTRUCTIONS
Wily,     We are always striving for excellence. Should you receive a patient experience survey electronically or by mail, we would appreciate if you would take a few moments to give us your feedback. These surveys let us know our strengths as well as areas of opportunity for improvement to better serve you.    Thank you for your time,  Rebeca Daniel LPN    Your test results will be communicated to you via : [facility], Telephone or Letter.   If you have not received your test results in one week, please contact the clinic at [phone].

## 2020-01-02 ENCOUNTER — PATIENT OUTREACH (OUTPATIENT)
Dept: ADMINISTRATIVE | Facility: OTHER | Age: 55
End: 2020-01-02

## 2020-01-03 ENCOUNTER — OFFICE VISIT (OUTPATIENT)
Dept: OPTOMETRY | Facility: CLINIC | Age: 55
End: 2020-01-03
Payer: COMMERCIAL

## 2020-01-03 ENCOUNTER — CLINICAL SUPPORT (OUTPATIENT)
Dept: INTERNAL MEDICINE | Facility: CLINIC | Age: 55
End: 2020-01-03
Payer: COMMERCIAL

## 2020-01-03 VITALS — WEIGHT: 223.13 LBS | BODY MASS INDEX: 32.01 KG/M2

## 2020-01-03 DIAGNOSIS — E11.9 TYPE 2 DIABETES MELLITUS WITHOUT RETINOPATHY: Primary | ICD-10-CM

## 2020-01-03 DIAGNOSIS — H01.004 BLEPHARITIS OF UPPER EYELIDS OF BOTH EYES, UNSPECIFIED TYPE: ICD-10-CM

## 2020-01-03 DIAGNOSIS — H52.4 MYOPIA WITH ASTIGMATISM AND PRESBYOPIA, BILATERAL: ICD-10-CM

## 2020-01-03 DIAGNOSIS — H52.13 MYOPIA WITH ASTIGMATISM AND PRESBYOPIA, BILATERAL: ICD-10-CM

## 2020-01-03 DIAGNOSIS — H01.001 BLEPHARITIS OF UPPER EYELIDS OF BOTH EYES, UNSPECIFIED TYPE: ICD-10-CM

## 2020-01-03 DIAGNOSIS — Q14.1 CONGENITAL HYPERTROPHY OF RETINAL PIGMENT EPITHELIUM: ICD-10-CM

## 2020-01-03 DIAGNOSIS — H52.203 MYOPIA WITH ASTIGMATISM AND PRESBYOPIA, BILATERAL: ICD-10-CM

## 2020-01-03 PROCEDURE — 92014 COMPRE OPH EXAM EST PT 1/>: CPT | Mod: S$GLB,,, | Performed by: OPTOMETRIST

## 2020-01-03 PROCEDURE — 92015 DETERMINE REFRACTIVE STATE: CPT | Mod: S$GLB,,, | Performed by: OPTOMETRIST

## 2020-01-03 PROCEDURE — 99999 PR PBB SHADOW E&M-EST. PATIENT-LVL II: ICD-10-PCS | Mod: PBBFAC,,, | Performed by: OPTOMETRIST

## 2020-01-03 PROCEDURE — 99999 PR PBB SHADOW E&M-EST. PATIENT-LVL II: ICD-10-PCS | Mod: PBBFAC,,,

## 2020-01-03 PROCEDURE — 99999 PR PBB SHADOW E&M-EST. PATIENT-LVL II: CPT | Mod: PBBFAC,,,

## 2020-01-03 PROCEDURE — 92014 PR EYE EXAM, EST PATIENT,COMPREHESV: ICD-10-PCS | Mod: S$GLB,,, | Performed by: OPTOMETRIST

## 2020-01-03 PROCEDURE — 99999 PR PBB SHADOW E&M-EST. PATIENT-LVL II: CPT | Mod: PBBFAC,,, | Performed by: OPTOMETRIST

## 2020-01-03 PROCEDURE — 92015 PR REFRACTION: ICD-10-PCS | Mod: S$GLB,,, | Performed by: OPTOMETRIST

## 2020-01-03 NOTE — TELEPHONE ENCOUNTER
Patient is out of strips and lancets.   May need new meter depending on insurance changes.   Thanks Laura

## 2020-01-03 NOTE — PROGRESS NOTES
HPI     55 y/o male is here for annual diabetic eye exam.  DLS 1/18/19 Dr Barger  Requesting a refraction no contact lenses  Pt states he has noticed a decrease in vision at night  Dry eyes no drops  No flashes or floaters   No surgeries    Hemoglobin A1C       Date                     Value               Ref Range             Status                09/12/2019               6.8 (H)             4.0 - 5.6 %           Final                  01/04/2019               6.1 (H)             4.0 - 5.6 %           Final                  08/07/2018               5.9 (H)             4.0 - 5.6 %           Final                    Last edited by Margarita Kruse, OD on 1/3/2020  9:56 AM. (History)            Assessment /Plan     For exam results, see Encounter Report.    Type 2 diabetes mellitus without retinopathy    Congenital hypertrophy of retinal pigment epithelium    Blepharitis of upper eyelids of both eyes, unspecified type    Myopia with astigmatism and presbyopia, bilateral      1. No retinopathy noted, OU. Continue proper BS control and annual DM eye exams. Monitor yearly.     2. CHRPE, OS. Stable. Monitor yearly with DFE.     3. Educated pt. Recommend J&J baby shampoo or Ocusoft lid scrubs along eyelid margin. H/o use in the past per pt. Monitor.    4. Updated SRx. Moderate change from previous SRx. However minimal change from presenting glasses (older pair with smaller Rx than previous). Pt happy with spec update in phoropter. Monitor yearly.   High myopia, educated on s/s of RD and to RTC ASAP if occur.       RTC in 1 year for annual eye exam or prn.

## 2020-01-03 NOTE — PROGRESS NOTES
Health  Follow-Up Note   [x] Office  [] Phone  Notes from previous session reviewed.   [x] Previous Session Goals unchanged.   [] Patient/Caregiver Change in Goals.  Goals added or changed by Patient/Caregiver since program participation:  1. Check blood sugar daily  2. Weight daily  3. Exercise 4-5 x week  4. Get all appts scheduled.       Additional/Changed support that patient/caregiver has experienced/sought?  (Indicate readiness, support from family, friends, others, community groups, etc)  1.  Friends    Additional/Changed obstacles that could prevent patient/caregiver from reaching their goals?  1.  Holidays    Feedback provided:  1.  Praised for continued effort and determination.     Diagnostic values/Desriptors for follow-up as needed for chronic condition(s)   Weight: 101.2 kg 223.11 lbs gain 5.3 lbs   Blood Glucose: ac 150 today    Interventions:   1. Health  listened reflectively, validated thoughts and feelings, offered support and encouragement.   2. Allowed patient to express themselves in a non-biased atmosphere.  3. Health  assisted pt to problem-solve obstacles such as being in a challenging environment and dealing with these challenges.   4. Motivational Interviewed interventions utilized (OARS).   5. Patient responded favorably to interventions and remained actively engaged in the session.   6. Health  will remain available and connected for patient by phone and/or office visits.   7. Positive reinforcement, emotional support and encouragement provided.   8. Focused Education: MI    Plan:  [x] Pt will work on goals as stated above.   [x] Pt will contact Health  for any questions, concerns or needs.  [x] Pt will follow up with Health  in office on  1.30.20.  [] Pt will follow up with Health  on phone in:        [x] Health  will remain available.   [] Health  will contact patient by phone in:        [] Health  will consult:        [] Health   will inform Provider via EPIC messaging.     Impression:  1. Behavior is consistent with Action Stage of Change.   2. Participation level:       [x] Receptive      [x] Interactive      [] Guarded and Resistant      [x] Self Motivated      [] Refused/Declined to participate   3. [x] Pt voiced understanding of all information presented.       [] Pt voiced needing further information/education. This will be arranged.       [x] Pt would benefit from further education/information as identified by this health . This will be arranged.     Laura Burch RN HC

## 2020-01-05 RX ORDER — INSULIN PUMP SYRINGE, 3 ML
EACH MISCELLANEOUS
Qty: 1 EACH | Refills: 0 | Status: SHIPPED | OUTPATIENT
Start: 2020-01-05 | End: 2023-03-09

## 2020-01-05 RX ORDER — LANCETS
EACH MISCELLANEOUS
Qty: 50 EACH | Refills: 6 | Status: SHIPPED | OUTPATIENT
Start: 2020-01-05

## 2020-02-08 RX ORDER — LISINOPRIL 40 MG/1
TABLET ORAL
Qty: 90 TABLET | Refills: 3 | Status: SHIPPED | OUTPATIENT
Start: 2020-02-08 | End: 2021-01-22

## 2020-03-04 ENCOUNTER — OFFICE VISIT (OUTPATIENT)
Dept: PRIMARY CARE CLINIC | Facility: CLINIC | Age: 55
End: 2020-03-04
Payer: COMMERCIAL

## 2020-03-04 VITALS
DIASTOLIC BLOOD PRESSURE: 80 MMHG | HEIGHT: 71 IN | BODY MASS INDEX: 30.83 KG/M2 | TEMPERATURE: 98 F | HEART RATE: 78 BPM | WEIGHT: 220.25 LBS | OXYGEN SATURATION: 98 % | SYSTOLIC BLOOD PRESSURE: 131 MMHG

## 2020-03-04 DIAGNOSIS — R50.9 FEVER, UNSPECIFIED FEVER CAUSE: Primary | ICD-10-CM

## 2020-03-04 DIAGNOSIS — J45.901 EXACERBATION OF ASTHMA, UNSPECIFIED ASTHMA SEVERITY, UNSPECIFIED WHETHER PERSISTENT: ICD-10-CM

## 2020-03-04 DIAGNOSIS — J18.9 PNEUMONIA DUE TO INFECTIOUS ORGANISM, UNSPECIFIED LATERALITY, UNSPECIFIED PART OF LUNG: ICD-10-CM

## 2020-03-04 DIAGNOSIS — R05.9 COUGH: ICD-10-CM

## 2020-03-04 LAB
CTP QC/QA: YES
CTP QC/QA: YES
FLUAV AG NPH QL: NEGATIVE
FLUBV AG NPH QL: NEGATIVE
S PYO RRNA THROAT QL PROBE: NEGATIVE

## 2020-03-04 PROCEDURE — 96372 THER/PROPH/DIAG INJ SC/IM: CPT | Mod: S$GLB,,, | Performed by: FAMILY MEDICINE

## 2020-03-04 PROCEDURE — 3008F PR BODY MASS INDEX (BMI) DOCUMENTED: ICD-10-PCS | Mod: CPTII,S$GLB,, | Performed by: FAMILY MEDICINE

## 2020-03-04 PROCEDURE — 99214 PR OFFICE/OUTPT VISIT, EST, LEVL IV, 30-39 MIN: ICD-10-PCS | Mod: 25,S$GLB,, | Performed by: FAMILY MEDICINE

## 2020-03-04 PROCEDURE — 87880 POCT RAPID STREP A: ICD-10-PCS | Mod: QW,S$GLB,, | Performed by: FAMILY MEDICINE

## 2020-03-04 PROCEDURE — 3079F DIAST BP 80-89 MM HG: CPT | Mod: CPTII,S$GLB,, | Performed by: FAMILY MEDICINE

## 2020-03-04 PROCEDURE — 3079F PR MOST RECENT DIASTOLIC BLOOD PRESSURE 80-89 MM HG: ICD-10-PCS | Mod: CPTII,S$GLB,, | Performed by: FAMILY MEDICINE

## 2020-03-04 PROCEDURE — 87880 STREP A ASSAY W/OPTIC: CPT | Mod: QW,S$GLB,, | Performed by: FAMILY MEDICINE

## 2020-03-04 PROCEDURE — 96372 PR INJECTION,THERAP/PROPH/DIAG2ST, IM OR SUBCUT: ICD-10-PCS | Mod: S$GLB,,, | Performed by: FAMILY MEDICINE

## 2020-03-04 PROCEDURE — 99999 PR PBB SHADOW E&M-EST. PATIENT-LVL III: CPT | Mod: PBBFAC,,, | Performed by: FAMILY MEDICINE

## 2020-03-04 PROCEDURE — 3075F PR MOST RECENT SYSTOLIC BLOOD PRESS GE 130-139MM HG: ICD-10-PCS | Mod: CPTII,S$GLB,, | Performed by: FAMILY MEDICINE

## 2020-03-04 PROCEDURE — 99999 PR PBB SHADOW E&M-EST. PATIENT-LVL III: ICD-10-PCS | Mod: PBBFAC,,, | Performed by: FAMILY MEDICINE

## 2020-03-04 PROCEDURE — 3008F BODY MASS INDEX DOCD: CPT | Mod: CPTII,S$GLB,, | Performed by: FAMILY MEDICINE

## 2020-03-04 PROCEDURE — 87804 INFLUENZA ASSAY W/OPTIC: CPT | Mod: QW,S$GLB,, | Performed by: FAMILY MEDICINE

## 2020-03-04 PROCEDURE — 3075F SYST BP GE 130 - 139MM HG: CPT | Mod: CPTII,S$GLB,, | Performed by: FAMILY MEDICINE

## 2020-03-04 PROCEDURE — 87804 POCT INFLUENZA A/B: ICD-10-PCS | Mod: 59,QW,S$GLB, | Performed by: FAMILY MEDICINE

## 2020-03-04 PROCEDURE — 99214 OFFICE O/P EST MOD 30 MIN: CPT | Mod: 25,S$GLB,, | Performed by: FAMILY MEDICINE

## 2020-03-04 RX ORDER — AZITHROMYCIN 250 MG/1
TABLET, FILM COATED ORAL
Qty: 6 TABLET | Refills: 0 | Status: SHIPPED | OUTPATIENT
Start: 2020-03-04 | End: 2021-04-30

## 2020-03-04 RX ORDER — METHYLPREDNISOLONE 4 MG/1
TABLET ORAL
Qty: 1 PACKAGE | Refills: 0 | Status: SHIPPED | OUTPATIENT
Start: 2020-03-04 | End: 2020-03-25

## 2020-03-04 RX ORDER — DEXAMETHASONE SODIUM PHOSPHATE 4 MG/ML
2 INJECTION, SOLUTION INTRA-ARTICULAR; INTRALESIONAL; INTRAMUSCULAR; INTRAVENOUS; SOFT TISSUE
Status: COMPLETED | OUTPATIENT
Start: 2020-03-04 | End: 2020-03-04

## 2020-03-04 RX ORDER — HYDROCODONE POLISTIREX AND CHLORPHENIRAMINE POLISTIREX 10; 8 MG/5ML; MG/5ML
5 SUSPENSION, EXTENDED RELEASE ORAL EVERY 12 HOURS PRN
Qty: 230 ML | Refills: 0 | Status: SHIPPED | OUTPATIENT
Start: 2020-03-04 | End: 2023-01-12

## 2020-03-04 RX ADMIN — DEXAMETHASONE SODIUM PHOSPHATE 2 MG: 4 INJECTION, SOLUTION INTRA-ARTICULAR; INTRALESIONAL; INTRAMUSCULAR; INTRAVENOUS; SOFT TISSUE at 09:03

## 2020-03-04 NOTE — PROGRESS NOTES
Two patient identifiers verified.  Allergies reviewed.  Decadron 2 mg IM administered to Right dorsagluteal per MD order.  Patient tolerated injection well; no redness, bleeding, or bruising noted to injection site.  Patient instructed to remain in clinic setting for 15 minutes.  Verbalizes understanding.

## 2020-03-04 NOTE — PATIENT INSTRUCTIONS
Walking Pneumonia (Mycoplasma Pneumonia)  What is walking pneumonia?    Pneumonia is an infection of one or both of the lungs. It's usually caused by a virus, or bacteria. Walking pneumonia is caused by a specific bacteria called mycoplasma. Pneumonia can be very serious, especially in infants, young children, and older adults. And also in those with other long-term health problems or weak immune systems. In otherwise healthy adults, pneumonia can be mild. Mycoplasma pneumonia is a mild form and is often called walking pneumonia.  What are the symptoms of walking pneumonia?  The most common symptom is a dry, hacking cough. You may also feel tired.  Other symptoms may include:  · Fever  · Headaches  · Chills  · Sweating  · Chest pain  · Ear pain  · Sore throat  How is walking pneumonia diagnosed?  Your healthcare provider will ask about your medical history and current symptoms. He or she will also examine you. You may also have tests including:  · Imaging. A chest X-ray of your chest may be done.  · Lab tests. Blood tests and sputum cultures may be done.  How is walking pneumonia treated?  Since it's caused by bacteria, antibiotics are usually prescribed. However, it may also go away without any treatment.  Your healthcare provider may also recommend medicines, either prescription or over-the-counter, and other measures to relieve symptoms such as:  · Acetaminophen or ibuprofen to lower your fever and lessen headache or other pain  · Cough medicine to loosen mucus or reduce coughing  · Bedrest or reduced activity  · Increased fluids to loosen mucus and replace lost fluids from sweating  Make sure you check with your healthcare provider or pharmacist before taking any over-the-counter medicines.  What are the complications of walking pneumonia?  Although walking pneumonia is usually mild, and it often goes away without treatment, complications can occur. They include:  · Severe pneumonia  · Serious infections in  other parts of the body  · Anemia or a low red blood cell count  · Kidney problems  · Skin conditions  What can I do to prevent walking pneumonia?  To prevent others from getting walking pneumonia:  · Try to stay away from other people if you are coughing a lot.  · Cover your mouth when you cough. It's easy to pass along this infection to other people through coughing, and contact with surfaces that you cough near.  · Wipe off surfaces, including phones, remote controls, and doorknobs with antibacterial or disinfectant products.  · Tell people to wash their hands if they touch things you have coughed near.  · Make sure to wash your hands often. Wash them before handling food or objects that others may touch. Use a separate towel or paper towels for drying.  Date Last Reviewed: 1/1/2017  © 8044-2566 [employer]. [address] [place] [address], [place] [address]. All rights reserved. This information is not intended as a substitute for professional medical care. Always follow your healthcare professional's instructions.

## 2020-03-05 NOTE — PROGRESS NOTES
"Subjective:       Patient ID: Wily Rodrigues is a 55 y.o. male.    Chief Complaint: Hoarse; Chest Congestion; and Cough  exposed to coworker with strep throat, recent recurrent asthma exacerbation  HPI  Review of Systems   Constitutional: Positive for fatigue.   HENT: Positive for congestion, rhinorrhea, sneezing and sore throat.    Respiratory: Positive for shortness of breath and wheezing.    Cardiovascular: Negative.    Gastrointestinal: Negative.    Endocrine: Negative.    Genitourinary: Negative.    Musculoskeletal: Negative.    Allergic/Immunologic: Negative.    Neurological: Negative.    Hematological: Negative.    Psychiatric/Behavioral: Negative.          Objective:   /80 (BP Location: Left arm)   Pulse 78   Temp 98.1 °F (36.7 °C)   Ht 5' 10.5" (1.791 m)   Wt 99.9 kg (220 lb 3.8 oz)   SpO2 98%   BMI 31.15 kg/m²      Physical Exam   Constitutional: He is oriented to person, place, and time and well-developed, well-nourished, and in no distress. No distress.   HENT:   Head: Normocephalic and atraumatic.   Right Ear: External ear normal.   Left Ear: External ear normal.   Nose: Nose normal.   Mouth/Throat: Mucous membranes are normal. Posterior oropharyngeal erythema present.   Eyes: Conjunctivae are normal. Pupils are equal, round, and reactive to light.   Neck: Normal range of motion. Neck supple. No JVD present. No tracheal deviation present. No thyromegaly present.   Cardiovascular: Normal rate and regular rhythm.    No murmur heard.  Pulmonary/Chest: Effort normal. No respiratory distress. He has wheezes. He has rales.   Abdominal: Soft. Bowel sounds are normal. He exhibits no distension. There is no tenderness.   Neurological: He is alert and oriented to person, place, and time. He has normal reflexes. No cranial nerve deficit. Gait normal. Coordination normal. GCS score is 15.   Skin: Skin is warm and dry. No rash noted. No erythema. No pallor.     Psychiatric: Mood, memory, affect and " judgment normal.         Assessment:       1. Fever, unspecified fever cause    2. Pneumonia due to infectious organism, unspecified laterality, unspecified part of lung    3. Cough    4. Exacerbation of asthma, unspecified asthma severity, unspecified whether persistent        Plan:       Problem List Items Addressed This Visit     None      Visit Diagnoses     Fever, unspecified fever cause    -  Primary    Relevant Orders    POCT Rapid Strep A (Completed)    POCT Influenza A/B (Completed)    Pneumonia due to infectious organism, unspecified laterality, unspecified part of lung        Relevant Medications    azithromycin (Z-JACOB) 250 MG tablet    Cough        Relevant Medications    hydrocodone-chlorpheniramine (TUSSIONEX) 10-8 mg/5 mL suspension    Exacerbation of asthma, unspecified asthma severity, unspecified whether persistent        Relevant Medications    methylPREDNISolone (MEDROL DOSEPACK) 4 mg tablet    dexamethasone injection 2 mg (Completed)

## 2020-04-23 ENCOUNTER — PATIENT MESSAGE (OUTPATIENT)
Dept: FAMILY MEDICINE | Facility: CLINIC | Age: 55
End: 2020-04-23

## 2020-04-23 DIAGNOSIS — J45.30 ASTHMA IN ADULT, MILD PERSISTENT, UNCOMPLICATED: Primary | ICD-10-CM

## 2020-04-24 DIAGNOSIS — E78.2 MIXED HYPERLIPIDEMIA: ICD-10-CM

## 2020-04-27 ENCOUNTER — PATIENT MESSAGE (OUTPATIENT)
Dept: FAMILY MEDICINE | Facility: CLINIC | Age: 55
End: 2020-04-27

## 2020-04-27 RX ORDER — ALBUTEROL SULFATE 90 UG/1
2 AEROSOL, METERED RESPIRATORY (INHALATION) EVERY 6 HOURS PRN
Qty: 18 G | Refills: 11 | Status: SHIPPED | OUTPATIENT
Start: 2020-04-27 | End: 2021-05-03

## 2020-05-01 ENCOUNTER — OFFICE VISIT (OUTPATIENT)
Dept: FAMILY MEDICINE | Facility: CLINIC | Age: 55
End: 2020-05-01
Attending: FAMILY MEDICINE
Payer: COMMERCIAL

## 2020-05-01 ENCOUNTER — PATIENT MESSAGE (OUTPATIENT)
Dept: FAMILY MEDICINE | Facility: CLINIC | Age: 55
End: 2020-05-01

## 2020-05-01 VITALS — HEIGHT: 70 IN | WEIGHT: 222 LBS | TEMPERATURE: 98 F | RESPIRATION RATE: 16 BRPM | BODY MASS INDEX: 31.78 KG/M2

## 2020-05-01 DIAGNOSIS — E11.9 CONTROLLED TYPE 2 DIABETES MELLITUS WITHOUT COMPLICATION, WITHOUT LONG-TERM CURRENT USE OF INSULIN: Primary | ICD-10-CM

## 2020-05-01 DIAGNOSIS — E78.2 MIXED HYPERLIPIDEMIA: ICD-10-CM

## 2020-05-01 DIAGNOSIS — I10 ESSENTIAL HYPERTENSION: ICD-10-CM

## 2020-05-01 PROCEDURE — 99213 PR OFFICE/OUTPT VISIT, EST, LEVL III, 20-29 MIN: ICD-10-PCS | Mod: 95,,, | Performed by: FAMILY MEDICINE

## 2020-05-01 PROCEDURE — 3044F PR MOST RECENT HEMOGLOBIN A1C LEVEL <7.0%: ICD-10-PCS | Mod: CPTII,,, | Performed by: FAMILY MEDICINE

## 2020-05-01 PROCEDURE — 3008F PR BODY MASS INDEX (BMI) DOCUMENTED: ICD-10-PCS | Mod: CPTII,,, | Performed by: FAMILY MEDICINE

## 2020-05-01 PROCEDURE — 3008F BODY MASS INDEX DOCD: CPT | Mod: CPTII,,, | Performed by: FAMILY MEDICINE

## 2020-05-01 PROCEDURE — 3044F HG A1C LEVEL LT 7.0%: CPT | Mod: CPTII,,, | Performed by: FAMILY MEDICINE

## 2020-05-01 PROCEDURE — 99213 OFFICE O/P EST LOW 20 MIN: CPT | Mod: 95,,, | Performed by: FAMILY MEDICINE

## 2020-05-01 NOTE — PROGRESS NOTES
"The patient location is: home  The chief complaint leading to consultation is: diabetes  Visit type: televisual  Total time spent with patient: 10 minutes  Each patient to whom he or she provides medical services by telemedicine is:  (1) informed of the relationship between the physician and patient and the respective role of any other health care provider with respect to management of the patient; and (2) notified that he or she may decline to receive medical services by telemedicine and may withdraw from such care at any time.    Subjective:       Patient ID: Wily Rodrigues is a 55 y.o. male.    Chief Complaint: Diabetes    HPI   Pt in virtual visit for follow up of dm pt admits he is not checking his blood sugars he did not get his labs drawn  He has htn he is taking his ace no sob/cp no cough.  Pt denies covid 19 symptoms currently but was told he had walking pneumonia in early march.  Pt denies uri symptoms no n/v/f/c/d/c no cough or chest congestion  Pt has hypercholesterolemia no muscle aches on statin  Review of Systems   Constitutional: Negative for chills, fatigue and fever.   HENT: Negative for congestion, ear pain, postnasal drip, sinus pressure, sinus pain and sore throat.    Eyes: Negative for discharge, redness and itching.   Respiratory: Negative for cough, chest tightness and shortness of breath.    Cardiovascular: Negative for chest pain and palpitations.   Gastrointestinal: Negative for abdominal distention, abdominal pain and diarrhea.       Objective:     Temp 97.6 °F (36.4 °C)   Resp 16   Ht 5' 10" (1.778 m)   Wt 103.4 kg (228 lb)   BMI 32.71 kg/m²   Physical Exam   Constitutional: He appears well-developed and well-nourished. No distress.   HENT:   Head: Normocephalic and atraumatic.   Nose: Nose normal.   Pt able to breath through his nose   Eyes: Pupils are equal, round, and reactive to light. EOM are normal. Right eye exhibits no discharge. Left eye exhibits no discharge. " "  Pulmonary/Chest: Effort normal. No respiratory distress.   Pt speaking effortlessly with ambulation     hgb a1c 6.8 in 9/2019  Assessment:       1. Controlled type 2 diabetes mellitus without complication, without long-term current use of insulin    2. Essential hypertension    3. Mixed hyperlipidemia        Plan:     orders cmp lipid hgb a1c  Cont meds  Ada low fat low salt diet  Graded exercise   rtc 3 months and prn    "This note will not be shared with the patient."   "

## 2020-05-13 ENCOUNTER — PATIENT MESSAGE (OUTPATIENT)
Dept: FAMILY MEDICINE | Facility: CLINIC | Age: 55
End: 2020-05-13

## 2020-05-17 RX ORDER — VALACYCLOVIR HYDROCHLORIDE 1 G/1
1000 TABLET, FILM COATED ORAL 2 TIMES DAILY
Qty: 14 TABLET | Refills: 6 | Status: SHIPPED | OUTPATIENT
Start: 2020-05-17 | End: 2021-06-02

## 2020-10-05 ENCOUNTER — PATIENT MESSAGE (OUTPATIENT)
Dept: INTERNAL MEDICINE | Facility: CLINIC | Age: 55
End: 2020-10-05

## 2020-10-22 ENCOUNTER — PATIENT MESSAGE (OUTPATIENT)
Dept: FAMILY MEDICINE | Facility: CLINIC | Age: 55
End: 2020-10-22

## 2020-10-26 ENCOUNTER — PATIENT MESSAGE (OUTPATIENT)
Dept: FAMILY MEDICINE | Facility: CLINIC | Age: 55
End: 2020-10-26

## 2020-10-26 ENCOUNTER — PATIENT OUTREACH (OUTPATIENT)
Dept: FAMILY MEDICINE | Facility: CLINIC | Age: 55
End: 2020-10-26

## 2020-10-26 DIAGNOSIS — Z12.11 SCREENING FOR COLORECTAL CANCER: ICD-10-CM

## 2020-10-26 DIAGNOSIS — E11.9 CONTROLLED TYPE 2 DIABETES MELLITUS WITHOUT COMPLICATION, WITHOUT LONG-TERM CURRENT USE OF INSULIN: Primary | ICD-10-CM

## 2020-10-26 DIAGNOSIS — Z11.59 NEED FOR HEPATITIS C SCREENING TEST: ICD-10-CM

## 2020-10-26 DIAGNOSIS — Z12.12 SCREENING FOR COLORECTAL CANCER: ICD-10-CM

## 2020-10-26 PROBLEM — F41.9 ANXIETY: Status: ACTIVE | Noted: 2020-10-26

## 2020-10-26 PROBLEM — F90.9 ATTENTION DEFICIT HYPERACTIVITY DISORDER: Status: ACTIVE | Noted: 2020-10-26

## 2020-10-27 ENCOUNTER — TELEPHONE (OUTPATIENT)
Dept: ADMINISTRATIVE | Facility: OTHER | Age: 55
End: 2020-10-27

## 2020-10-27 NOTE — TELEPHONE ENCOUNTER
Left voice message for patient to return call to schedule appointment from referral to Podiatry.  Marie [phone]

## 2020-10-28 ENCOUNTER — OFFICE VISIT (OUTPATIENT)
Dept: FAMILY MEDICINE | Facility: CLINIC | Age: 55
End: 2020-10-28
Attending: FAMILY MEDICINE
Payer: COMMERCIAL

## 2020-10-28 ENCOUNTER — LAB VISIT (OUTPATIENT)
Dept: LAB | Facility: HOSPITAL | Age: 55
End: 2020-10-28
Attending: FAMILY MEDICINE
Payer: COMMERCIAL

## 2020-10-28 VITALS
HEIGHT: 70 IN | SYSTOLIC BLOOD PRESSURE: 119 MMHG | HEART RATE: 83 BPM | BODY MASS INDEX: 29.66 KG/M2 | RESPIRATION RATE: 16 BRPM | DIASTOLIC BLOOD PRESSURE: 77 MMHG | WEIGHT: 207.19 LBS

## 2020-10-28 DIAGNOSIS — Z01.84 ENCOUNTER FOR ANTIBODY RESPONSE EXAMINATION: ICD-10-CM

## 2020-10-28 DIAGNOSIS — I10 ESSENTIAL HYPERTENSION: ICD-10-CM

## 2020-10-28 DIAGNOSIS — Z00.00 ANNUAL PHYSICAL EXAM: Primary | ICD-10-CM

## 2020-10-28 DIAGNOSIS — Z11.59 NEED FOR HEPATITIS C SCREENING TEST: ICD-10-CM

## 2020-10-28 DIAGNOSIS — E11.9 CONTROLLED TYPE 2 DIABETES MELLITUS WITHOUT COMPLICATION, WITHOUT LONG-TERM CURRENT USE OF INSULIN: ICD-10-CM

## 2020-10-28 DIAGNOSIS — Z00.00 ANNUAL PHYSICAL EXAM: ICD-10-CM

## 2020-10-28 DIAGNOSIS — E03.9 HYPOTHYROIDISM (ACQUIRED): ICD-10-CM

## 2020-10-28 LAB
ALBUMIN SERPL BCP-MCNC: 4.3 G/DL (ref 3.5–5.2)
ALBUMIN/CREAT UR: ABNORMAL UG/MG (ref 0–30)
ALP SERPL-CCNC: 103 U/L (ref 55–135)
ALT SERPL W/O P-5'-P-CCNC: 51 U/L (ref 10–44)
ANION GAP SERPL CALC-SCNC: 13 MMOL/L (ref 8–16)
AST SERPL-CCNC: 31 U/L (ref 10–40)
BASOPHILS # BLD AUTO: 0.05 K/UL (ref 0–0.2)
BASOPHILS NFR BLD: 0.7 % (ref 0–1.9)
BILIRUB SERPL-MCNC: 0.6 MG/DL (ref 0.1–1)
BUN SERPL-MCNC: 12 MG/DL (ref 6–20)
CALCIUM SERPL-MCNC: 9.8 MG/DL (ref 8.7–10.5)
CHLORIDE SERPL-SCNC: 97 MMOL/L (ref 95–110)
CHOLEST SERPL-MCNC: 231 MG/DL (ref 120–199)
CHOLEST/HDLC SERPL: 5.5 {RATIO} (ref 2–5)
CO2 SERPL-SCNC: 26 MMOL/L (ref 23–29)
CREAT SERPL-MCNC: 1 MG/DL (ref 0.5–1.4)
CREAT UR-MCNC: >450 MG/DL (ref 23–375)
DIFFERENTIAL METHOD: NORMAL
EOSINOPHIL # BLD AUTO: 0.2 K/UL (ref 0–0.5)
EOSINOPHIL NFR BLD: 2.1 % (ref 0–8)
ERYTHROCYTE [DISTWIDTH] IN BLOOD BY AUTOMATED COUNT: 12.7 % (ref 11.5–14.5)
EST. GFR  (AFRICAN AMERICAN): >60 ML/MIN/1.73 M^2
EST. GFR  (NON AFRICAN AMERICAN): >60 ML/MIN/1.73 M^2
ESTIMATED AVG GLUCOSE: 275 MG/DL (ref 68–131)
GLUCOSE SERPL-MCNC: 293 MG/DL (ref 70–110)
HBA1C MFR BLD HPLC: 11.2 % (ref 4–5.6)
HCT VFR BLD AUTO: 47.4 % (ref 40–54)
HDLC SERPL-MCNC: 42 MG/DL (ref 40–75)
HDLC SERPL: 18.2 % (ref 20–50)
HGB BLD-MCNC: 15.4 G/DL (ref 14–18)
IMM GRANULOCYTES # BLD AUTO: 0.03 K/UL (ref 0–0.04)
IMM GRANULOCYTES NFR BLD AUTO: 0.4 % (ref 0–0.5)
LDLC SERPL CALC-MCNC: 135 MG/DL (ref 63–159)
LYMPHOCYTES # BLD AUTO: 2.2 K/UL (ref 1–4.8)
LYMPHOCYTES NFR BLD: 30.2 % (ref 18–48)
MCH RBC QN AUTO: 29.5 PG (ref 27–31)
MCHC RBC AUTO-ENTMCNC: 32.5 G/DL (ref 32–36)
MCV RBC AUTO: 91 FL (ref 82–98)
MICROALBUMIN UR DL<=1MG/L-MCNC: 308 UG/ML
MONOCYTES # BLD AUTO: 0.4 K/UL (ref 0.3–1)
MONOCYTES NFR BLD: 6.1 % (ref 4–15)
NEUTROPHILS # BLD AUTO: 4.4 K/UL (ref 1.8–7.7)
NEUTROPHILS NFR BLD: 60.5 % (ref 38–73)
NONHDLC SERPL-MCNC: 189 MG/DL
NRBC BLD-RTO: 0 /100 WBC
PLATELET # BLD AUTO: 300 K/UL (ref 150–350)
PMV BLD AUTO: 11.4 FL (ref 9.2–12.9)
POTASSIUM SERPL-SCNC: 4.1 MMOL/L (ref 3.5–5.1)
PROT SERPL-MCNC: 7.7 G/DL (ref 6–8.4)
RBC # BLD AUTO: 5.22 M/UL (ref 4.6–6.2)
SARS-COV-2 IGG SERPLBLD QL IA.RAPID: NEGATIVE
SODIUM SERPL-SCNC: 136 MMOL/L (ref 136–145)
TRIGL SERPL-MCNC: 270 MG/DL (ref 30–150)
TSH SERPL DL<=0.005 MIU/L-ACNC: 3.05 UIU/ML (ref 0.4–4)
WBC # BLD AUTO: 7.24 K/UL (ref 3.9–12.7)

## 2020-10-28 PROCEDURE — 90686 IIV4 VACC NO PRSV 0.5 ML IM: CPT | Mod: S$GLB,,, | Performed by: FAMILY MEDICINE

## 2020-10-28 PROCEDURE — 80061 LIPID PANEL: CPT

## 2020-10-28 PROCEDURE — 83036 HEMOGLOBIN GLYCOSYLATED A1C: CPT

## 2020-10-28 PROCEDURE — 99999 PR PBB SHADOW E&M-EST. PATIENT-LVL IV: ICD-10-PCS | Mod: PBBFAC,,, | Performed by: FAMILY MEDICINE

## 2020-10-28 PROCEDURE — 3078F DIAST BP <80 MM HG: CPT | Mod: CPTII,S$GLB,, | Performed by: FAMILY MEDICINE

## 2020-10-28 PROCEDURE — 90471 FLU VACCINE (QUAD) GREATER THAN OR EQUAL TO 3YO PRESERVATIVE FREE IM: ICD-10-PCS | Mod: S$GLB,,, | Performed by: FAMILY MEDICINE

## 2020-10-28 PROCEDURE — 36415 COLL VENOUS BLD VENIPUNCTURE: CPT | Mod: PO

## 2020-10-28 PROCEDURE — 3074F PR MOST RECENT SYSTOLIC BLOOD PRESSURE < 130 MM HG: ICD-10-PCS | Mod: CPTII,S$GLB,, | Performed by: FAMILY MEDICINE

## 2020-10-28 PROCEDURE — 3008F BODY MASS INDEX DOCD: CPT | Mod: CPTII,S$GLB,, | Performed by: FAMILY MEDICINE

## 2020-10-28 PROCEDURE — 86803 HEPATITIS C AB TEST: CPT

## 2020-10-28 PROCEDURE — 82043 UR ALBUMIN QUANTITATIVE: CPT

## 2020-10-28 PROCEDURE — 84443 ASSAY THYROID STIM HORMONE: CPT

## 2020-10-28 PROCEDURE — 3008F PR BODY MASS INDEX (BMI) DOCUMENTED: ICD-10-PCS | Mod: CPTII,S$GLB,, | Performed by: FAMILY MEDICINE

## 2020-10-28 PROCEDURE — 90471 IMMUNIZATION ADMIN: CPT | Mod: S$GLB,,, | Performed by: FAMILY MEDICINE

## 2020-10-28 PROCEDURE — 86769 SARS-COV-2 COVID-19 ANTIBODY: CPT

## 2020-10-28 PROCEDURE — 3074F SYST BP LT 130 MM HG: CPT | Mod: CPTII,S$GLB,, | Performed by: FAMILY MEDICINE

## 2020-10-28 PROCEDURE — 90686 FLU VACCINE (QUAD) GREATER THAN OR EQUAL TO 3YO PRESERVATIVE FREE IM: ICD-10-PCS | Mod: S$GLB,,, | Performed by: FAMILY MEDICINE

## 2020-10-28 PROCEDURE — 85025 COMPLETE CBC W/AUTO DIFF WBC: CPT

## 2020-10-28 PROCEDURE — 99999 PR PBB SHADOW E&M-EST. PATIENT-LVL IV: CPT | Mod: PBBFAC,,, | Performed by: FAMILY MEDICINE

## 2020-10-28 PROCEDURE — 99396 PREV VISIT EST AGE 40-64: CPT | Mod: 25,S$GLB,, | Performed by: FAMILY MEDICINE

## 2020-10-28 PROCEDURE — 3078F PR MOST RECENT DIASTOLIC BLOOD PRESSURE < 80 MM HG: ICD-10-PCS | Mod: CPTII,S$GLB,, | Performed by: FAMILY MEDICINE

## 2020-10-28 PROCEDURE — 80053 COMPREHEN METABOLIC PANEL: CPT

## 2020-10-28 PROCEDURE — 99396 PR PREVENTIVE VISIT,EST,40-64: ICD-10-PCS | Mod: 25,S$GLB,, | Performed by: FAMILY MEDICINE

## 2020-10-28 NOTE — PATIENT INSTRUCTIONS
Wily,     We are always striving for excellence. Should you receive a patient experience survey electronically or by mail, we would appreciate if you would take a few moments to give us your feedback. These surveys let us know our strengths as well as areas of opportunity for improvement to better serve you.    Thank you for your time,  Rebeca Daniel LPN    Your test results will be communicated to you via : My [facility], Telephone or Letter.   If you have not received your test results in one week, please contact the clinic at [phone].

## 2020-10-28 NOTE — PROGRESS NOTES
"Subjective:       Patient ID: Wily Rodrigues is a 55 y.o. male.    Chief Complaint: Annual Exam    HPI   Pt is here for annual exam pt is well no sob/cp no change in bowel habits no brbpr   Pt has diet controlled dm on ada diet no regular exercise but starting a program  Pt has htn stable on ace hctz no cough no muscle cramps bp fine today  Pt has hypothyroid no unexplained weight changes    Review of Systems   Constitutional: Negative for activity change, chills, fatigue and fever.   HENT: Negative for congestion, ear pain, hearing loss, postnasal drip, rhinorrhea, sinus pressure and sore throat.    Eyes: Negative for photophobia, pain, redness and visual disturbance.   Respiratory: Negative for cough, chest tightness and shortness of breath.    Cardiovascular: Negative for chest pain, palpitations and leg swelling.   Gastrointestinal: Negative for abdominal pain, blood in stool, constipation, diarrhea, nausea and vomiting.   Endocrine: Negative for polydipsia and polyuria.   Genitourinary: Negative for decreased urine volume, difficulty urinating, discharge, dysuria, frequency and urgency.   Musculoskeletal: Negative for back pain, joint swelling and neck pain.   Skin: Negative for color change, pallor and rash.   Neurological: Negative for dizziness, seizures, speech difficulty and numbness.   Hematological: Does not bruise/bleed easily.   Psychiatric/Behavioral: Negative for behavioral problems, confusion, decreased concentration and suicidal ideas. The patient is not nervous/anxious.        Objective:     /77 (BP Location: Left arm, Patient Position: Sitting, BP Method: Large (Automatic))   Pulse 83   Resp 16   Ht 5' 10" (1.778 m)   Wt 94 kg (207 lb 3.2 oz)   BMI 29.73 kg/m²     Physical Exam  Constitutional:       General: He is not in acute distress.     Appearance: Normal appearance. He is well-developed. He is not ill-appearing.   HENT:      Head: Normocephalic and atraumatic.      Nose: Nose " "normal.   Eyes:      Pupils: Pupils are equal, round, and reactive to light.   Neck:      Musculoskeletal: Normal range of motion and neck supple.      Thyroid: No thyromegaly.   Cardiovascular:      Rate and Rhythm: Normal rate and regular rhythm.      Heart sounds: Normal heart sounds. No murmur. No friction rub. No gallop.    Pulmonary:      Effort: Pulmonary effort is normal. No respiratory distress.      Breath sounds: Normal breath sounds.   Abdominal:      General: Bowel sounds are normal. There is no distension.      Palpations: Abdomen is soft.      Tenderness: There is no abdominal tenderness. There is no guarding or rebound.   Genitourinary:     Comments: declined  Musculoskeletal: Normal range of motion.         General: No tenderness.   Skin:     General: Skin is warm and dry.      Findings: No erythema.   Neurological:      General: No focal deficit present.      Mental Status: He is alert and oriented to person, place, and time.      Cranial Nerves: No cranial nerve deficit.      Coordination: Coordination normal.   Psychiatric:         Mood and Affect: Mood normal.         Behavior: Behavior normal.         Thought Content: Thought content normal.         Judgment: Judgment normal.         Assessment:       1. Annual physical exam    2. Controlled type 2 diabetes mellitus without complication, without long-term current use of insulin    3. Encounter for antibody response examination    4. Hypothyroidism (acquired)    5. Essential hypertension        Plan:     orders cmp cbc lipid tsh urine  Cont meds   low fat low salt diet  Graded exercise  rtc 6 months     Health  Maintenance  Lipid ordered  Flu discussed  Tetanus q 10 years  Colonoscopy discussed  Shingles at pharmacy      "This note will not be shared with the patient."     "

## 2020-10-29 LAB — HCV AB SERPL QL IA: NEGATIVE

## 2020-10-30 ENCOUNTER — PATIENT MESSAGE (OUTPATIENT)
Dept: ADMINISTRATIVE | Facility: HOSPITAL | Age: 55
End: 2020-10-30

## 2020-10-31 RX ORDER — METFORMIN HYDROCHLORIDE 500 MG/1
500 TABLET, EXTENDED RELEASE ORAL 2 TIMES DAILY WITH MEALS
Qty: 180 TABLET | Refills: 0 | Status: SHIPPED | OUTPATIENT
Start: 2020-10-31 | End: 2021-01-22

## 2020-11-05 ENCOUNTER — PATIENT MESSAGE (OUTPATIENT)
Dept: FAMILY MEDICINE | Facility: CLINIC | Age: 55
End: 2020-11-05

## 2020-11-05 ENCOUNTER — TELEPHONE (OUTPATIENT)
Dept: FAMILY MEDICINE | Facility: CLINIC | Age: 55
End: 2020-11-05

## 2020-11-05 NOTE — TELEPHONE ENCOUNTER
----- Message from Areli Kramer MD sent at 10/31/2020  9:38 AM CDT -----  Please help pt set up a virtual visit for dm follow up.  I sent metformin to his pharmacy please have pt take the evening dose for a week then add the am dose.  fbs log see me in 2 weeks virtual

## 2020-11-09 ENCOUNTER — TELEPHONE (OUTPATIENT)
Dept: FAMILY MEDICINE | Facility: CLINIC | Age: 55
End: 2020-11-09

## 2020-11-13 ENCOUNTER — PATIENT OUTREACH (OUTPATIENT)
Dept: FAMILY MEDICINE | Facility: CLINIC | Age: 55
End: 2020-11-13

## 2020-11-27 ENCOUNTER — OFFICE VISIT (OUTPATIENT)
Dept: FAMILY MEDICINE | Facility: CLINIC | Age: 55
End: 2020-11-27
Attending: FAMILY MEDICINE
Payer: COMMERCIAL

## 2020-11-27 VITALS — RESPIRATION RATE: 16 BRPM | HEIGHT: 70 IN | BODY MASS INDEX: 29.73 KG/M2 | TEMPERATURE: 99 F

## 2020-11-27 DIAGNOSIS — E78.2 MIXED HYPERLIPIDEMIA: ICD-10-CM

## 2020-11-27 DIAGNOSIS — I10 ESSENTIAL HYPERTENSION: ICD-10-CM

## 2020-11-27 PROCEDURE — 99212 OFFICE O/P EST SF 10 MIN: CPT | Mod: 95,,, | Performed by: FAMILY MEDICINE

## 2020-11-27 PROCEDURE — 3072F PR LOW RISK FOR RETINOPATHY: ICD-10-PCS | Mod: ,,, | Performed by: FAMILY MEDICINE

## 2020-11-27 PROCEDURE — 3046F HEMOGLOBIN A1C LEVEL >9.0%: CPT | Mod: CPTII,,, | Performed by: FAMILY MEDICINE

## 2020-11-27 PROCEDURE — 3008F BODY MASS INDEX DOCD: CPT | Mod: CPTII,,, | Performed by: FAMILY MEDICINE

## 2020-11-27 PROCEDURE — 1126F AMNT PAIN NOTED NONE PRSNT: CPT | Mod: ,,, | Performed by: FAMILY MEDICINE

## 2020-11-27 PROCEDURE — 1126F PR PAIN SEVERITY QUANTIFIED, NO PAIN PRESENT: ICD-10-PCS | Mod: ,,, | Performed by: FAMILY MEDICINE

## 2020-11-27 PROCEDURE — 3072F LOW RISK FOR RETINOPATHY: CPT | Mod: ,,, | Performed by: FAMILY MEDICINE

## 2020-11-27 PROCEDURE — 3046F PR MOST RECENT HEMOGLOBIN A1C LEVEL > 9.0%: ICD-10-PCS | Mod: CPTII,,, | Performed by: FAMILY MEDICINE

## 2020-11-27 PROCEDURE — 99212 PR OFFICE/OUTPT VISIT, EST, LEVL II, 10-19 MIN: ICD-10-PCS | Mod: 95,,, | Performed by: FAMILY MEDICINE

## 2020-11-27 PROCEDURE — 3008F PR BODY MASS INDEX (BMI) DOCUMENTED: ICD-10-PCS | Mod: CPTII,,, | Performed by: FAMILY MEDICINE

## 2020-11-27 NOTE — PROGRESS NOTES
Subjective:    The patient location is: home  The chief complaint leading to consultation is: dm    Visit type: TELE AUDIOVISUAL    Face to Face time with patient: 15 minutes of total time spent on the encounter, which includes face to face time and non-face to face time preparing to see the patient (eg, review of tests), Obtaining and/or reviewing separately obtained history, Documenting clinical information in the electronic or other health record, Independently interpreting results (not separately reported) and communicating results to the patient/family/caregiver, or Care coordination (not separately reported).         Each patient to whom he or she provides medical services by telemedicine is:  (1) informed of the relationship between the physician and patient and the respective role of any other health care provider with respect to management of the patient; and (2) notified that he or she may decline to receive medical services by telemedicine and may withdraw from such care at any time.    Notes:    Patient ID: Wily Rodrigues is a 55 y.o. male.    Chief Complaint: dm    HPI   Pt in virtual for follow up of dm pt is taking his metformin and tolerating it well no adverse gi side effects  He does not consistently get the evening dose in however.    Pt has htn on ace hctz no cough muscle cramps no sob/cp  Pt has hypercholesterolemia on statin no muscle aches  Pt denies covid symptoms no n/v/f/c/d/c no cough chest congestion sore throat  Pt denies covid depression anxiety no si/hi no panic attacks  Review of Systems   Constitutional: Negative for chills, fatigue and fever.   HENT: Negative for congestion, ear pain, sinus pressure and sore throat.    Eyes: Negative for redness.   Respiratory: Negative for cough, chest tightness and shortness of breath.    Cardiovascular: Negative for chest pain and palpitations.   Gastrointestinal: Negative for abdominal distention and abdominal pain.   Endocrine: Negative for  "polydipsia, polyphagia and polyuria.   Skin: Negative for pallor.   Psychiatric/Behavioral: Negative for confusion, dysphoric mood, sleep disturbance and suicidal ideas. The patient is not nervous/anxious.        Objective:      Physical Exam  Constitutional:       Appearance: Normal appearance. He is not ill-appearing.   HENT:      Head: Normocephalic and atraumatic.      Nose: Nose normal. No congestion.   Eyes:      General:         Right eye: No discharge.         Left eye: No discharge.      Extraocular Movements: Extraocular movements intact.   Pulmonary:      Effort: Pulmonary effort is normal. No respiratory distress.   Neurological:      General: No focal deficit present.      Mental Status: He is alert and oriented to person, place, and time.      Cranial Nerves: No cranial nerve deficit.   Psychiatric:         Mood and Affect: Mood normal.         Behavior: Behavior normal.         Thought Content: Thought content normal.         Judgment: Judgment normal.       hgb a1c over 11 10/28/2020  Assessment:       1. Uncontrolled type 2 diabetes mellitus with complication, without long-term current use of insulin    2. Essential hypertension    3. Mixed hyperlipidemia        Plan:        orders cmp lipid hgb a1c pta appt 3 months  Cont meds  Ada diet  Graded exercise  rtc quarterly     "This note will not be shared with the patient."     "

## 2021-01-04 ENCOUNTER — PATIENT MESSAGE (OUTPATIENT)
Dept: ADMINISTRATIVE | Facility: HOSPITAL | Age: 56
End: 2021-01-04

## 2021-02-21 ENCOUNTER — PATIENT MESSAGE (OUTPATIENT)
Dept: FAMILY MEDICINE | Facility: CLINIC | Age: 56
End: 2021-02-21

## 2021-03-18 ENCOUNTER — IMMUNIZATION (OUTPATIENT)
Dept: INTERNAL MEDICINE | Facility: CLINIC | Age: 56
End: 2021-03-18
Payer: COMMERCIAL

## 2021-03-18 DIAGNOSIS — Z23 NEED FOR VACCINATION: Primary | ICD-10-CM

## 2021-03-18 PROCEDURE — 91300 COVID-19, MRNA, LNP-S, PF, 30 MCG/0.3 ML DOSE VACCINE: CPT | Mod: PBBFAC | Performed by: INTERNAL MEDICINE

## 2021-03-19 DIAGNOSIS — E11.9 TYPE 2 DIABETES MELLITUS WITHOUT COMPLICATION, UNSPECIFIED WHETHER LONG TERM INSULIN USE: ICD-10-CM

## 2021-04-05 ENCOUNTER — PATIENT MESSAGE (OUTPATIENT)
Dept: ADMINISTRATIVE | Facility: HOSPITAL | Age: 56
End: 2021-04-05

## 2021-04-08 ENCOUNTER — IMMUNIZATION (OUTPATIENT)
Dept: INTERNAL MEDICINE | Facility: CLINIC | Age: 56
End: 2021-04-08

## 2021-04-08 DIAGNOSIS — Z23 NEED FOR VACCINATION: Primary | ICD-10-CM

## 2021-04-08 PROCEDURE — 0002A COVID-19, MRNA, LNP-S, PF, 30 MCG/0.3 ML DOSE VACCINE: CPT | Mod: CV19,S$GLB,, | Performed by: INTERNAL MEDICINE

## 2021-04-08 PROCEDURE — 91300 COVID-19, MRNA, LNP-S, PF, 30 MCG/0.3 ML DOSE VACCINE: ICD-10-PCS | Mod: S$GLB,,, | Performed by: INTERNAL MEDICINE

## 2021-04-08 PROCEDURE — 91300 COVID-19, MRNA, LNP-S, PF, 30 MCG/0.3 ML DOSE VACCINE: CPT | Mod: S$GLB,,, | Performed by: INTERNAL MEDICINE

## 2021-04-08 PROCEDURE — 0002A COVID-19, MRNA, LNP-S, PF, 30 MCG/0.3 ML DOSE VACCINE: ICD-10-PCS | Mod: CV19,S$GLB,, | Performed by: INTERNAL MEDICINE

## 2021-04-26 ENCOUNTER — PATIENT MESSAGE (OUTPATIENT)
Dept: FAMILY MEDICINE | Facility: CLINIC | Age: 56
End: 2021-04-26

## 2021-04-26 ENCOUNTER — PATIENT MESSAGE (OUTPATIENT)
Dept: ADMINISTRATIVE | Facility: HOSPITAL | Age: 56
End: 2021-04-26

## 2021-04-26 ENCOUNTER — PATIENT OUTREACH (OUTPATIENT)
Dept: ADMINISTRATIVE | Facility: HOSPITAL | Age: 56
End: 2021-04-26

## 2021-04-26 ENCOUNTER — PATIENT MESSAGE (OUTPATIENT)
Dept: PRIMARY CARE CLINIC | Facility: CLINIC | Age: 56
End: 2021-04-26

## 2021-04-26 DIAGNOSIS — J18.9 PNEUMONIA DUE TO INFECTIOUS ORGANISM, UNSPECIFIED LATERALITY, UNSPECIFIED PART OF LUNG: ICD-10-CM

## 2021-04-28 ENCOUNTER — PATIENT MESSAGE (OUTPATIENT)
Dept: FAMILY MEDICINE | Facility: CLINIC | Age: 56
End: 2021-04-28

## 2021-04-30 ENCOUNTER — OFFICE VISIT (OUTPATIENT)
Dept: FAMILY MEDICINE | Facility: CLINIC | Age: 56
End: 2021-04-30

## 2021-04-30 VITALS
HEART RATE: 75 BPM | HEIGHT: 70 IN | BODY MASS INDEX: 29.2 KG/M2 | OXYGEN SATURATION: 98 % | WEIGHT: 204 LBS | SYSTOLIC BLOOD PRESSURE: 139 MMHG | DIASTOLIC BLOOD PRESSURE: 79 MMHG

## 2021-04-30 DIAGNOSIS — J45.901 EXACERBATION OF ASTHMA, UNSPECIFIED ASTHMA SEVERITY, UNSPECIFIED WHETHER PERSISTENT: Primary | ICD-10-CM

## 2021-04-30 PROCEDURE — 99213 PR OFFICE/OUTPT VISIT, EST, LEVL III, 20-29 MIN: ICD-10-PCS | Mod: S$PBB,,, | Performed by: NURSE PRACTITIONER

## 2021-04-30 PROCEDURE — 96372 THER/PROPH/DIAG INJ SC/IM: CPT | Mod: PBBFAC,PO

## 2021-04-30 PROCEDURE — 99999 PR PBB SHADOW E&M-EST. PATIENT-LVL IV: ICD-10-PCS | Mod: PBBFAC,,, | Performed by: NURSE PRACTITIONER

## 2021-04-30 PROCEDURE — 99213 OFFICE O/P EST LOW 20 MIN: CPT | Mod: S$PBB,,, | Performed by: NURSE PRACTITIONER

## 2021-04-30 PROCEDURE — 99999 PR PBB SHADOW E&M-EST. PATIENT-LVL IV: CPT | Mod: PBBFAC,,, | Performed by: NURSE PRACTITIONER

## 2021-04-30 PROCEDURE — 99214 OFFICE O/P EST MOD 30 MIN: CPT | Mod: PBBFAC,PO | Performed by: NURSE PRACTITIONER

## 2021-04-30 RX ORDER — AZITHROMYCIN 250 MG/1
TABLET, FILM COATED ORAL
Qty: 6 TABLET | Refills: 0 | Status: SHIPPED | OUTPATIENT
Start: 2021-04-30 | End: 2021-05-05

## 2021-04-30 RX ORDER — BENZONATATE 100 MG/1
100 CAPSULE ORAL 3 TIMES DAILY PRN
Qty: 30 CAPSULE | Refills: 0 | Status: SHIPPED | OUTPATIENT
Start: 2021-04-30 | End: 2021-09-29

## 2021-04-30 RX ORDER — METHYLPREDNISOLONE ACETATE 40 MG/ML
40 INJECTION, SUSPENSION INTRA-ARTICULAR; INTRALESIONAL; INTRAMUSCULAR; SOFT TISSUE
Status: COMPLETED | OUTPATIENT
Start: 2021-04-30 | End: 2021-04-30

## 2021-04-30 RX ADMIN — METHYLPREDNISOLONE ACETATE 40 MG: 40 INJECTION, SUSPENSION INTRA-ARTICULAR; INTRALESIONAL; INTRAMUSCULAR; SOFT TISSUE at 10:04

## 2021-06-16 ENCOUNTER — PATIENT MESSAGE (OUTPATIENT)
Dept: OPTOMETRY | Facility: CLINIC | Age: 56
End: 2021-06-16

## 2021-06-16 ENCOUNTER — PATIENT MESSAGE (OUTPATIENT)
Dept: SLEEP MEDICINE | Facility: CLINIC | Age: 56
End: 2021-06-16

## 2021-06-17 ENCOUNTER — PATIENT MESSAGE (OUTPATIENT)
Dept: OPTOMETRY | Facility: CLINIC | Age: 56
End: 2021-06-17

## 2021-06-23 ENCOUNTER — TELEPHONE (OUTPATIENT)
Dept: SLEEP MEDICINE | Facility: CLINIC | Age: 56
End: 2021-06-23

## 2021-06-30 ENCOUNTER — OFFICE VISIT (OUTPATIENT)
Dept: NEUROLOGY | Facility: CLINIC | Age: 56
End: 2021-06-30
Payer: COMMERCIAL

## 2021-06-30 ENCOUNTER — PATIENT OUTREACH (OUTPATIENT)
Dept: ADMINISTRATIVE | Facility: OTHER | Age: 56
End: 2021-06-30

## 2021-06-30 DIAGNOSIS — J44.9 CHRONIC OBSTRUCTIVE PULMONARY DISEASE, UNSPECIFIED COPD TYPE: ICD-10-CM

## 2021-06-30 DIAGNOSIS — G47.33 OBSTRUCTIVE SLEEP APNEA: ICD-10-CM

## 2021-06-30 DIAGNOSIS — J45.909 SEVERE ASTHMA, UNSPECIFIED WHETHER COMPLICATED, UNSPECIFIED WHETHER PERSISTENT: Primary | ICD-10-CM

## 2021-06-30 PROCEDURE — 99214 OFFICE O/P EST MOD 30 MIN: CPT | Mod: 95,CR,, | Performed by: NURSE PRACTITIONER

## 2021-06-30 PROCEDURE — 99214 PR OFFICE/OUTPT VISIT, EST, LEVL IV, 30-39 MIN: ICD-10-PCS | Mod: 95,CR,, | Performed by: NURSE PRACTITIONER

## 2021-06-30 PROCEDURE — 3072F PR LOW RISK FOR RETINOPATHY: ICD-10-PCS | Mod: 95,,, | Performed by: NURSE PRACTITIONER

## 2021-06-30 PROCEDURE — 3072F LOW RISK FOR RETINOPATHY: CPT | Mod: 95,,, | Performed by: NURSE PRACTITIONER

## 2021-07-07 ENCOUNTER — PATIENT MESSAGE (OUTPATIENT)
Dept: ADMINISTRATIVE | Facility: HOSPITAL | Age: 56
End: 2021-07-07

## 2021-07-12 ENCOUNTER — TELEPHONE (OUTPATIENT)
Dept: SLEEP MEDICINE | Facility: OTHER | Age: 56
End: 2021-07-12

## 2021-07-20 ENCOUNTER — PATIENT MESSAGE (OUTPATIENT)
Dept: FAMILY MEDICINE | Facility: CLINIC | Age: 56
End: 2021-07-20

## 2021-07-20 ENCOUNTER — PATIENT MESSAGE (OUTPATIENT)
Dept: ADMINISTRATIVE | Facility: HOSPITAL | Age: 56
End: 2021-07-20

## 2021-07-20 ENCOUNTER — TELEPHONE (OUTPATIENT)
Dept: SLEEP MEDICINE | Facility: OTHER | Age: 56
End: 2021-07-20

## 2021-07-20 DIAGNOSIS — I10 ESSENTIAL HYPERTENSION: ICD-10-CM

## 2021-07-20 DIAGNOSIS — Z00.00 ANNUAL PHYSICAL EXAM: Primary | ICD-10-CM

## 2021-07-20 DIAGNOSIS — Z12.5 SCREENING FOR PROSTATE CANCER: ICD-10-CM

## 2021-07-21 ENCOUNTER — OFFICE VISIT (OUTPATIENT)
Dept: FAMILY MEDICINE | Facility: CLINIC | Age: 56
End: 2021-07-21
Attending: FAMILY MEDICINE
Payer: COMMERCIAL

## 2021-07-21 ENCOUNTER — LAB VISIT (OUTPATIENT)
Dept: LAB | Facility: HOSPITAL | Age: 56
End: 2021-07-21
Attending: FAMILY MEDICINE
Payer: COMMERCIAL

## 2021-07-21 VITALS
SYSTOLIC BLOOD PRESSURE: 130 MMHG | BODY MASS INDEX: 30.56 KG/M2 | RESPIRATION RATE: 16 BRPM | WEIGHT: 213.5 LBS | HEIGHT: 70 IN | DIASTOLIC BLOOD PRESSURE: 77 MMHG | HEART RATE: 65 BPM

## 2021-07-21 DIAGNOSIS — Z12.5 SCREENING FOR PROSTATE CANCER: ICD-10-CM

## 2021-07-21 DIAGNOSIS — Z00.00 ANNUAL PHYSICAL EXAM: Primary | ICD-10-CM

## 2021-07-21 DIAGNOSIS — I10 ESSENTIAL HYPERTENSION: ICD-10-CM

## 2021-07-21 DIAGNOSIS — E11.69 DIABETES MELLITUS TYPE 2 IN OBESE: ICD-10-CM

## 2021-07-21 DIAGNOSIS — E03.9 ACQUIRED HYPOTHYROIDISM: ICD-10-CM

## 2021-07-21 DIAGNOSIS — E66.9 DIABETES MELLITUS TYPE 2 IN OBESE: ICD-10-CM

## 2021-07-21 DIAGNOSIS — Z00.00 ANNUAL PHYSICAL EXAM: ICD-10-CM

## 2021-07-21 DIAGNOSIS — E78.2 MIXED HYPERLIPIDEMIA: ICD-10-CM

## 2021-07-21 LAB
ALBUMIN SERPL BCP-MCNC: 3.9 G/DL (ref 3.5–5.2)
ALBUMIN/CREAT UR: 56.2 UG/MG (ref 0–30)
ALP SERPL-CCNC: 98 U/L (ref 55–135)
ALT SERPL W/O P-5'-P-CCNC: 31 U/L (ref 10–44)
ANION GAP SERPL CALC-SCNC: 10 MMOL/L (ref 8–16)
AST SERPL-CCNC: 22 U/L (ref 10–40)
BASOPHILS # BLD AUTO: 0.03 K/UL (ref 0–0.2)
BASOPHILS NFR BLD: 0.5 % (ref 0–1.9)
BILIRUB SERPL-MCNC: 0.4 MG/DL (ref 0.1–1)
BUN SERPL-MCNC: 15 MG/DL (ref 6–20)
CALCIUM SERPL-MCNC: 9.2 MG/DL (ref 8.7–10.5)
CHLORIDE SERPL-SCNC: 102 MMOL/L (ref 95–110)
CHOLEST SERPL-MCNC: 200 MG/DL (ref 120–199)
CHOLEST/HDLC SERPL: 5.9 {RATIO} (ref 2–5)
CO2 SERPL-SCNC: 25 MMOL/L (ref 23–29)
COMPLEXED PSA SERPL-MCNC: 1.7 NG/ML (ref 0–4)
CREAT SERPL-MCNC: 0.8 MG/DL (ref 0.5–1.4)
CREAT UR-MCNC: 146 MG/DL (ref 23–375)
DIFFERENTIAL METHOD: ABNORMAL
EOSINOPHIL # BLD AUTO: 0.2 K/UL (ref 0–0.5)
EOSINOPHIL NFR BLD: 2.6 % (ref 0–8)
ERYTHROCYTE [DISTWIDTH] IN BLOOD BY AUTOMATED COUNT: 12.5 % (ref 11.5–14.5)
EST. GFR  (AFRICAN AMERICAN): >60 ML/MIN/1.73 M^2
EST. GFR  (NON AFRICAN AMERICAN): >60 ML/MIN/1.73 M^2
ESTIMATED AVG GLUCOSE: 258 MG/DL (ref 68–131)
GLUCOSE SERPL-MCNC: 306 MG/DL (ref 70–110)
HBA1C MFR BLD: 10.6 % (ref 4–5.6)
HCT VFR BLD AUTO: 42.4 % (ref 40–54)
HDLC SERPL-MCNC: 34 MG/DL (ref 40–75)
HDLC SERPL: 17 % (ref 20–50)
HGB BLD-MCNC: 14.6 G/DL (ref 14–18)
IMM GRANULOCYTES # BLD AUTO: 0.04 K/UL (ref 0–0.04)
IMM GRANULOCYTES NFR BLD AUTO: 0.6 % (ref 0–0.5)
LDLC SERPL CALC-MCNC: 104.4 MG/DL (ref 63–159)
LYMPHOCYTES # BLD AUTO: 1.9 K/UL (ref 1–4.8)
LYMPHOCYTES NFR BLD: 29.6 % (ref 18–48)
MCH RBC QN AUTO: 30.3 PG (ref 27–31)
MCHC RBC AUTO-ENTMCNC: 34.4 G/DL (ref 32–36)
MCV RBC AUTO: 88 FL (ref 82–98)
MICROALBUMIN UR DL<=1MG/L-MCNC: 82 UG/ML
MONOCYTES # BLD AUTO: 0.4 K/UL (ref 0.3–1)
MONOCYTES NFR BLD: 6.1 % (ref 4–15)
NEUTROPHILS # BLD AUTO: 3.9 K/UL (ref 1.8–7.7)
NEUTROPHILS NFR BLD: 60.6 % (ref 38–73)
NONHDLC SERPL-MCNC: 166 MG/DL
NRBC BLD-RTO: 0 /100 WBC
PLATELET # BLD AUTO: 240 K/UL (ref 150–450)
PMV BLD AUTO: 11.2 FL (ref 9.2–12.9)
POTASSIUM SERPL-SCNC: 4.1 MMOL/L (ref 3.5–5.1)
PROT SERPL-MCNC: 6.9 G/DL (ref 6–8.4)
RBC # BLD AUTO: 4.82 M/UL (ref 4.6–6.2)
SODIUM SERPL-SCNC: 137 MMOL/L (ref 136–145)
TRIGL SERPL-MCNC: 308 MG/DL (ref 30–150)
TSH SERPL DL<=0.005 MIU/L-ACNC: 2.44 UIU/ML (ref 0.4–4)
WBC # BLD AUTO: 6.42 K/UL (ref 3.9–12.7)

## 2021-07-21 PROCEDURE — 3072F PR LOW RISK FOR RETINOPATHY: ICD-10-PCS | Mod: CPTII,S$GLB,, | Performed by: FAMILY MEDICINE

## 2021-07-21 PROCEDURE — 80053 COMPREHEN METABOLIC PANEL: CPT | Performed by: FAMILY MEDICINE

## 2021-07-21 PROCEDURE — 3078F DIAST BP <80 MM HG: CPT | Mod: CPTII,S$GLB,, | Performed by: FAMILY MEDICINE

## 2021-07-21 PROCEDURE — 84153 ASSAY OF PSA TOTAL: CPT | Performed by: FAMILY MEDICINE

## 2021-07-21 PROCEDURE — 84443 ASSAY THYROID STIM HORMONE: CPT | Performed by: FAMILY MEDICINE

## 2021-07-21 PROCEDURE — 80061 LIPID PANEL: CPT | Performed by: FAMILY MEDICINE

## 2021-07-21 PROCEDURE — 99999 PR PBB SHADOW E&M-EST. PATIENT-LVL V: ICD-10-PCS | Mod: PBBFAC,,, | Performed by: FAMILY MEDICINE

## 2021-07-21 PROCEDURE — 85025 COMPLETE CBC W/AUTO DIFF WBC: CPT | Performed by: FAMILY MEDICINE

## 2021-07-21 PROCEDURE — 3008F PR BODY MASS INDEX (BMI) DOCUMENTED: ICD-10-PCS | Mod: CPTII,S$GLB,, | Performed by: FAMILY MEDICINE

## 2021-07-21 PROCEDURE — 1126F AMNT PAIN NOTED NONE PRSNT: CPT | Mod: CPTII,S$GLB,, | Performed by: FAMILY MEDICINE

## 2021-07-21 PROCEDURE — 3046F HEMOGLOBIN A1C LEVEL >9.0%: CPT | Mod: CPTII,S$GLB,, | Performed by: FAMILY MEDICINE

## 2021-07-21 PROCEDURE — 83036 HEMOGLOBIN GLYCOSYLATED A1C: CPT | Performed by: FAMILY MEDICINE

## 2021-07-21 PROCEDURE — 99396 PR PREVENTIVE VISIT,EST,40-64: ICD-10-PCS | Mod: S$GLB,,, | Performed by: FAMILY MEDICINE

## 2021-07-21 PROCEDURE — 3075F PR MOST RECENT SYSTOLIC BLOOD PRESS GE 130-139MM HG: ICD-10-PCS | Mod: CPTII,S$GLB,, | Performed by: FAMILY MEDICINE

## 2021-07-21 PROCEDURE — 3008F BODY MASS INDEX DOCD: CPT | Mod: CPTII,S$GLB,, | Performed by: FAMILY MEDICINE

## 2021-07-21 PROCEDURE — 82043 UR ALBUMIN QUANTITATIVE: CPT | Performed by: FAMILY MEDICINE

## 2021-07-21 PROCEDURE — 1126F PR PAIN SEVERITY QUANTIFIED, NO PAIN PRESENT: ICD-10-PCS | Mod: CPTII,S$GLB,, | Performed by: FAMILY MEDICINE

## 2021-07-21 PROCEDURE — 3075F SYST BP GE 130 - 139MM HG: CPT | Mod: CPTII,S$GLB,, | Performed by: FAMILY MEDICINE

## 2021-07-21 PROCEDURE — 82570 ASSAY OF URINE CREATININE: CPT | Performed by: FAMILY MEDICINE

## 2021-07-21 PROCEDURE — 36415 COLL VENOUS BLD VENIPUNCTURE: CPT | Mod: PO | Performed by: FAMILY MEDICINE

## 2021-07-21 PROCEDURE — 3072F LOW RISK FOR RETINOPATHY: CPT | Mod: CPTII,S$GLB,, | Performed by: FAMILY MEDICINE

## 2021-07-21 PROCEDURE — 99999 PR PBB SHADOW E&M-EST. PATIENT-LVL V: CPT | Mod: PBBFAC,,, | Performed by: FAMILY MEDICINE

## 2021-07-21 PROCEDURE — 3046F PR MOST RECENT HEMOGLOBIN A1C LEVEL > 9.0%: ICD-10-PCS | Mod: CPTII,S$GLB,, | Performed by: FAMILY MEDICINE

## 2021-07-21 PROCEDURE — 3078F PR MOST RECENT DIASTOLIC BLOOD PRESSURE < 80 MM HG: ICD-10-PCS | Mod: CPTII,S$GLB,, | Performed by: FAMILY MEDICINE

## 2021-07-21 PROCEDURE — 99396 PREV VISIT EST AGE 40-64: CPT | Mod: S$GLB,,, | Performed by: FAMILY MEDICINE

## 2021-07-21 RX ORDER — SULFACETAMIDE SODIUM 100 MG/ML
1 SOLUTION/ DROPS OPHTHALMIC 4 TIMES DAILY
Qty: 5 ML | Refills: 0 | Status: SHIPPED | OUTPATIENT
Start: 2021-07-21 | End: 2021-12-04

## 2021-07-21 RX ORDER — CETIRIZINE HYDROCHLORIDE 10 MG/1
10 TABLET ORAL DAILY
Qty: 90 TABLET | Refills: 0 | Status: SHIPPED | OUTPATIENT
Start: 2021-07-21 | End: 2021-08-21

## 2021-07-21 RX ORDER — METHYLPHENIDATE HYDROCHLORIDE 20 MG/1
20 TABLET ORAL DAILY
COMMUNITY
Start: 2021-07-06

## 2021-07-23 ENCOUNTER — PATIENT OUTREACH (OUTPATIENT)
Dept: ADMINISTRATIVE | Facility: HOSPITAL | Age: 56
End: 2021-07-23

## 2021-07-23 ENCOUNTER — PATIENT MESSAGE (OUTPATIENT)
Dept: ADMINISTRATIVE | Facility: HOSPITAL | Age: 56
End: 2021-07-23

## 2021-07-25 RX ORDER — GLIMEPIRIDE 2 MG/1
2 TABLET ORAL
Qty: 90 TABLET | Refills: 3 | Status: SHIPPED | OUTPATIENT
Start: 2021-07-25 | End: 2021-09-10 | Stop reason: SDUPTHER

## 2021-07-26 ENCOUNTER — TELEPHONE (OUTPATIENT)
Dept: SLEEP MEDICINE | Facility: OTHER | Age: 56
End: 2021-07-26

## 2021-08-13 ENCOUNTER — OFFICE VISIT (OUTPATIENT)
Dept: FAMILY MEDICINE | Facility: CLINIC | Age: 56
End: 2021-08-13
Attending: FAMILY MEDICINE
Payer: COMMERCIAL

## 2021-08-13 ENCOUNTER — CLINICAL SUPPORT (OUTPATIENT)
Dept: INTERNAL MEDICINE | Facility: CLINIC | Age: 56
End: 2021-08-13
Payer: COMMERCIAL

## 2021-08-13 VITALS — WEIGHT: 216.25 LBS | BODY MASS INDEX: 31.03 KG/M2

## 2021-08-13 DIAGNOSIS — I10 ESSENTIAL HYPERTENSION: ICD-10-CM

## 2021-08-13 DIAGNOSIS — E11.69 DIABETES MELLITUS TYPE 2 IN OBESE: Primary | ICD-10-CM

## 2021-08-13 DIAGNOSIS — E66.9 DIABETES MELLITUS TYPE 2 IN OBESE: Primary | ICD-10-CM

## 2021-08-13 PROCEDURE — 3072F LOW RISK FOR RETINOPATHY: CPT | Mod: CPTII,95,, | Performed by: FAMILY MEDICINE

## 2021-08-13 PROCEDURE — 3072F PR LOW RISK FOR RETINOPATHY: ICD-10-PCS | Mod: CPTII,95,, | Performed by: FAMILY MEDICINE

## 2021-08-13 PROCEDURE — 1160F RVW MEDS BY RX/DR IN RCRD: CPT | Mod: CPTII,95,, | Performed by: FAMILY MEDICINE

## 2021-08-13 PROCEDURE — 99214 OFFICE O/P EST MOD 30 MIN: CPT | Mod: 95,,, | Performed by: FAMILY MEDICINE

## 2021-08-13 PROCEDURE — 99214 PR OFFICE/OUTPT VISIT, EST, LEVL IV, 30-39 MIN: ICD-10-PCS | Mod: 95,,, | Performed by: FAMILY MEDICINE

## 2021-08-13 PROCEDURE — 1159F MED LIST DOCD IN RCRD: CPT | Mod: CPTII,95,, | Performed by: FAMILY MEDICINE

## 2021-08-13 PROCEDURE — 3046F HEMOGLOBIN A1C LEVEL >9.0%: CPT | Mod: CPTII,95,, | Performed by: FAMILY MEDICINE

## 2021-08-13 PROCEDURE — 3046F PR MOST RECENT HEMOGLOBIN A1C LEVEL > 9.0%: ICD-10-PCS | Mod: CPTII,95,, | Performed by: FAMILY MEDICINE

## 2021-08-13 PROCEDURE — 1160F PR REVIEW ALL MEDS BY PRESCRIBER/CLIN PHARMACIST DOCUMENTED: ICD-10-PCS | Mod: CPTII,95,, | Performed by: FAMILY MEDICINE

## 2021-08-13 PROCEDURE — 1159F PR MEDICATION LIST DOCUMENTED IN MEDICAL RECORD: ICD-10-PCS | Mod: CPTII,95,, | Performed by: FAMILY MEDICINE

## 2021-09-07 ENCOUNTER — TELEPHONE (OUTPATIENT)
Dept: FAMILY MEDICINE | Facility: CLINIC | Age: 56
End: 2021-09-07

## 2021-09-10 ENCOUNTER — PATIENT MESSAGE (OUTPATIENT)
Dept: FAMILY MEDICINE | Facility: CLINIC | Age: 56
End: 2021-09-10

## 2021-09-10 ENCOUNTER — OFFICE VISIT (OUTPATIENT)
Dept: FAMILY MEDICINE | Facility: CLINIC | Age: 56
End: 2021-09-10
Attending: FAMILY MEDICINE
Payer: COMMERCIAL

## 2021-09-10 DIAGNOSIS — E66.9 DIABETES MELLITUS TYPE 2 IN OBESE: Primary | ICD-10-CM

## 2021-09-10 DIAGNOSIS — E66.9 OBESITY WITH BODY MASS INDEX GREATER THAN 30: ICD-10-CM

## 2021-09-10 DIAGNOSIS — E11.69 DIABETES MELLITUS TYPE 2 IN OBESE: Primary | ICD-10-CM

## 2021-09-10 DIAGNOSIS — I10 ESSENTIAL HYPERTENSION: ICD-10-CM

## 2021-09-10 PROCEDURE — 1160F PR REVIEW ALL MEDS BY PRESCRIBER/CLIN PHARMACIST DOCUMENTED: ICD-10-PCS | Mod: CPTII,95,, | Performed by: FAMILY MEDICINE

## 2021-09-10 PROCEDURE — 3066F NEPHROPATHY DOC TX: CPT | Mod: CPTII,95,, | Performed by: FAMILY MEDICINE

## 2021-09-10 PROCEDURE — 1159F PR MEDICATION LIST DOCUMENTED IN MEDICAL RECORD: ICD-10-PCS | Mod: CPTII,95,, | Performed by: FAMILY MEDICINE

## 2021-09-10 PROCEDURE — 4010F PR ACE/ARB THEARPY RXD/TAKEN: ICD-10-PCS | Mod: CPTII,95,, | Performed by: FAMILY MEDICINE

## 2021-09-10 PROCEDURE — 4010F ACE/ARB THERAPY RXD/TAKEN: CPT | Mod: CPTII,95,, | Performed by: FAMILY MEDICINE

## 2021-09-10 PROCEDURE — 1160F RVW MEDS BY RX/DR IN RCRD: CPT | Mod: CPTII,95,, | Performed by: FAMILY MEDICINE

## 2021-09-10 PROCEDURE — 3046F PR MOST RECENT HEMOGLOBIN A1C LEVEL > 9.0%: ICD-10-PCS | Mod: CPTII,95,, | Performed by: FAMILY MEDICINE

## 2021-09-10 PROCEDURE — 99214 OFFICE O/P EST MOD 30 MIN: CPT | Mod: 95,,, | Performed by: FAMILY MEDICINE

## 2021-09-10 PROCEDURE — 3066F PR DOCUMENTATION OF TREATMENT FOR NEPHROPATHY: ICD-10-PCS | Mod: CPTII,95,, | Performed by: FAMILY MEDICINE

## 2021-09-10 PROCEDURE — 3072F LOW RISK FOR RETINOPATHY: CPT | Mod: CPTII,95,, | Performed by: FAMILY MEDICINE

## 2021-09-10 PROCEDURE — 1159F MED LIST DOCD IN RCRD: CPT | Mod: CPTII,95,, | Performed by: FAMILY MEDICINE

## 2021-09-10 PROCEDURE — 99214 PR OFFICE/OUTPT VISIT, EST, LEVL IV, 30-39 MIN: ICD-10-PCS | Mod: 95,,, | Performed by: FAMILY MEDICINE

## 2021-09-10 PROCEDURE — 3072F PR LOW RISK FOR RETINOPATHY: ICD-10-PCS | Mod: CPTII,95,, | Performed by: FAMILY MEDICINE

## 2021-09-10 PROCEDURE — 3060F POS MICROALBUMINURIA REV: CPT | Mod: CPTII,95,, | Performed by: FAMILY MEDICINE

## 2021-09-10 PROCEDURE — 3046F HEMOGLOBIN A1C LEVEL >9.0%: CPT | Mod: CPTII,95,, | Performed by: FAMILY MEDICINE

## 2021-09-10 PROCEDURE — 3060F PR POS MICROALBUMINURIA RESULT DOCUMENTED/REVIEW: ICD-10-PCS | Mod: CPTII,95,, | Performed by: FAMILY MEDICINE

## 2021-09-10 RX ORDER — GLIMEPIRIDE 4 MG/1
4 TABLET ORAL
Qty: 90 TABLET | Refills: 3 | Status: SHIPPED | OUTPATIENT
Start: 2021-09-10 | End: 2021-12-01 | Stop reason: SDUPTHER

## 2021-09-13 ENCOUNTER — PATIENT MESSAGE (OUTPATIENT)
Dept: FAMILY MEDICINE | Facility: CLINIC | Age: 56
End: 2021-09-13

## 2021-09-29 ENCOUNTER — OFFICE VISIT (OUTPATIENT)
Dept: FAMILY MEDICINE | Facility: CLINIC | Age: 56
End: 2021-09-29
Attending: FAMILY MEDICINE
Payer: COMMERCIAL

## 2021-09-29 ENCOUNTER — LAB VISIT (OUTPATIENT)
Dept: LAB | Facility: HOSPITAL | Age: 56
End: 2021-09-29
Attending: FAMILY MEDICINE
Payer: COMMERCIAL

## 2021-09-29 VITALS
OXYGEN SATURATION: 98 % | HEIGHT: 70 IN | BODY MASS INDEX: 31.07 KG/M2 | SYSTOLIC BLOOD PRESSURE: 124 MMHG | DIASTOLIC BLOOD PRESSURE: 74 MMHG | HEART RATE: 69 BPM | WEIGHT: 217 LBS

## 2021-09-29 DIAGNOSIS — E66.9 DIABETES MELLITUS TYPE 2 IN OBESE: ICD-10-CM

## 2021-09-29 DIAGNOSIS — E78.2 MIXED HYPERLIPIDEMIA: ICD-10-CM

## 2021-09-29 DIAGNOSIS — E11.69 DIABETES MELLITUS TYPE 2 IN OBESE: Primary | ICD-10-CM

## 2021-09-29 DIAGNOSIS — I10 ESSENTIAL HYPERTENSION: ICD-10-CM

## 2021-09-29 DIAGNOSIS — E66.9 DIABETES MELLITUS TYPE 2 IN OBESE: Primary | ICD-10-CM

## 2021-09-29 DIAGNOSIS — E11.69 DIABETES MELLITUS TYPE 2 IN OBESE: ICD-10-CM

## 2021-09-29 LAB
ALBUMIN SERPL BCP-MCNC: 3.9 G/DL (ref 3.5–5.2)
ALP SERPL-CCNC: 88 U/L (ref 55–135)
ALT SERPL W/O P-5'-P-CCNC: 59 U/L (ref 10–44)
ANION GAP SERPL CALC-SCNC: 10 MMOL/L (ref 8–16)
AST SERPL-CCNC: 28 U/L (ref 10–40)
BILIRUB SERPL-MCNC: 0.3 MG/DL (ref 0.1–1)
BUN SERPL-MCNC: 18 MG/DL (ref 6–20)
CALCIUM SERPL-MCNC: 9.7 MG/DL (ref 8.7–10.5)
CHLORIDE SERPL-SCNC: 99 MMOL/L (ref 95–110)
CHOLEST SERPL-MCNC: 193 MG/DL (ref 120–199)
CHOLEST/HDLC SERPL: 5.4 {RATIO} (ref 2–5)
CO2 SERPL-SCNC: 26 MMOL/L (ref 23–29)
CREAT SERPL-MCNC: 0.8 MG/DL (ref 0.5–1.4)
EST. GFR  (AFRICAN AMERICAN): >60 ML/MIN/1.73 M^2
EST. GFR  (NON AFRICAN AMERICAN): >60 ML/MIN/1.73 M^2
ESTIMATED AVG GLUCOSE: 209 MG/DL (ref 68–131)
GLUCOSE SERPL-MCNC: 225 MG/DL (ref 70–110)
HBA1C MFR BLD: 8.9 % (ref 4–5.6)
HDLC SERPL-MCNC: 36 MG/DL (ref 40–75)
HDLC SERPL: 18.7 % (ref 20–50)
LDLC SERPL CALC-MCNC: 102.4 MG/DL (ref 63–159)
NONHDLC SERPL-MCNC: 157 MG/DL
POTASSIUM SERPL-SCNC: 4.4 MMOL/L (ref 3.5–5.1)
PROT SERPL-MCNC: 6.4 G/DL (ref 6–8.4)
SODIUM SERPL-SCNC: 135 MMOL/L (ref 136–145)
TRIGL SERPL-MCNC: 273 MG/DL (ref 30–150)

## 2021-09-29 PROCEDURE — 3078F PR MOST RECENT DIASTOLIC BLOOD PRESSURE < 80 MM HG: ICD-10-PCS | Mod: CPTII,S$GLB,, | Performed by: FAMILY MEDICINE

## 2021-09-29 PROCEDURE — 3060F PR POS MICROALBUMINURIA RESULT DOCUMENTED/REVIEW: ICD-10-PCS | Mod: CPTII,S$GLB,, | Performed by: FAMILY MEDICINE

## 2021-09-29 PROCEDURE — 1160F PR REVIEW ALL MEDS BY PRESCRIBER/CLIN PHARMACIST DOCUMENTED: ICD-10-PCS | Mod: CPTII,S$GLB,, | Performed by: FAMILY MEDICINE

## 2021-09-29 PROCEDURE — 3078F DIAST BP <80 MM HG: CPT | Mod: CPTII,S$GLB,, | Performed by: FAMILY MEDICINE

## 2021-09-29 PROCEDURE — 3008F PR BODY MASS INDEX (BMI) DOCUMENTED: ICD-10-PCS | Mod: CPTII,S$GLB,, | Performed by: FAMILY MEDICINE

## 2021-09-29 PROCEDURE — 3074F PR MOST RECENT SYSTOLIC BLOOD PRESSURE < 130 MM HG: ICD-10-PCS | Mod: CPTII,S$GLB,, | Performed by: FAMILY MEDICINE

## 2021-09-29 PROCEDURE — 3066F PR DOCUMENTATION OF TREATMENT FOR NEPHROPATHY: ICD-10-PCS | Mod: CPTII,S$GLB,, | Performed by: FAMILY MEDICINE

## 2021-09-29 PROCEDURE — 3046F PR MOST RECENT HEMOGLOBIN A1C LEVEL > 9.0%: ICD-10-PCS | Mod: CPTII,S$GLB,, | Performed by: FAMILY MEDICINE

## 2021-09-29 PROCEDURE — 1159F PR MEDICATION LIST DOCUMENTED IN MEDICAL RECORD: ICD-10-PCS | Mod: CPTII,S$GLB,, | Performed by: FAMILY MEDICINE

## 2021-09-29 PROCEDURE — 99999 PR PBB SHADOW E&M-EST. PATIENT-LVL IV: ICD-10-PCS | Mod: PBBFAC,,, | Performed by: FAMILY MEDICINE

## 2021-09-29 PROCEDURE — 3060F POS MICROALBUMINURIA REV: CPT | Mod: CPTII,S$GLB,, | Performed by: FAMILY MEDICINE

## 2021-09-29 PROCEDURE — 3072F PR LOW RISK FOR RETINOPATHY: ICD-10-PCS | Mod: CPTII,S$GLB,, | Performed by: FAMILY MEDICINE

## 2021-09-29 PROCEDURE — 3046F HEMOGLOBIN A1C LEVEL >9.0%: CPT | Mod: CPTII,S$GLB,, | Performed by: FAMILY MEDICINE

## 2021-09-29 PROCEDURE — 99214 PR OFFICE/OUTPT VISIT, EST, LEVL IV, 30-39 MIN: ICD-10-PCS | Mod: S$GLB,,, | Performed by: FAMILY MEDICINE

## 2021-09-29 PROCEDURE — 80061 LIPID PANEL: CPT | Performed by: FAMILY MEDICINE

## 2021-09-29 PROCEDURE — 4010F PR ACE/ARB THEARPY RXD/TAKEN: ICD-10-PCS | Mod: CPTII,S$GLB,, | Performed by: FAMILY MEDICINE

## 2021-09-29 PROCEDURE — 36415 COLL VENOUS BLD VENIPUNCTURE: CPT | Mod: PO | Performed by: FAMILY MEDICINE

## 2021-09-29 PROCEDURE — 99214 OFFICE O/P EST MOD 30 MIN: CPT | Mod: S$GLB,,, | Performed by: FAMILY MEDICINE

## 2021-09-29 PROCEDURE — 3066F NEPHROPATHY DOC TX: CPT | Mod: CPTII,S$GLB,, | Performed by: FAMILY MEDICINE

## 2021-09-29 PROCEDURE — 3074F SYST BP LT 130 MM HG: CPT | Mod: CPTII,S$GLB,, | Performed by: FAMILY MEDICINE

## 2021-09-29 PROCEDURE — 1160F RVW MEDS BY RX/DR IN RCRD: CPT | Mod: CPTII,S$GLB,, | Performed by: FAMILY MEDICINE

## 2021-09-29 PROCEDURE — 99999 PR PBB SHADOW E&M-EST. PATIENT-LVL IV: CPT | Mod: PBBFAC,,, | Performed by: FAMILY MEDICINE

## 2021-09-29 PROCEDURE — 3008F BODY MASS INDEX DOCD: CPT | Mod: CPTII,S$GLB,, | Performed by: FAMILY MEDICINE

## 2021-09-29 PROCEDURE — 1159F MED LIST DOCD IN RCRD: CPT | Mod: CPTII,S$GLB,, | Performed by: FAMILY MEDICINE

## 2021-09-29 PROCEDURE — 4010F ACE/ARB THERAPY RXD/TAKEN: CPT | Mod: CPTII,S$GLB,, | Performed by: FAMILY MEDICINE

## 2021-09-29 PROCEDURE — 80053 COMPREHEN METABOLIC PANEL: CPT | Performed by: FAMILY MEDICINE

## 2021-09-29 PROCEDURE — 83036 HEMOGLOBIN GLYCOSYLATED A1C: CPT | Performed by: FAMILY MEDICINE

## 2021-09-29 PROCEDURE — 3072F LOW RISK FOR RETINOPATHY: CPT | Mod: CPTII,S$GLB,, | Performed by: FAMILY MEDICINE

## 2021-09-29 RX ORDER — METFORMIN HYDROCHLORIDE 500 MG/1
500 TABLET, EXTENDED RELEASE ORAL 2 TIMES DAILY WITH MEALS
Qty: 180 TABLET | Refills: 3 | Status: SHIPPED | OUTPATIENT
Start: 2021-09-29 | End: 2021-12-01 | Stop reason: SDUPTHER

## 2021-09-30 ENCOUNTER — IMMUNIZATION (OUTPATIENT)
Dept: INTERNAL MEDICINE | Facility: CLINIC | Age: 56
End: 2021-09-30
Payer: COMMERCIAL

## 2021-09-30 DIAGNOSIS — Z23 NEED FOR VACCINATION: Primary | ICD-10-CM

## 2021-09-30 PROCEDURE — 0003A COVID-19, MRNA, LNP-S, PF, 30 MCG/0.3 ML DOSE VACCINE: CPT | Mod: PBBFAC | Performed by: INTERNAL MEDICINE

## 2021-09-30 PROCEDURE — 91300 COVID-19, MRNA, LNP-S, PF, 30 MCG/0.3 ML DOSE VACCINE: CPT | Mod: PBBFAC | Performed by: INTERNAL MEDICINE

## 2021-10-01 ENCOUNTER — CLINICAL SUPPORT (OUTPATIENT)
Dept: INTERNAL MEDICINE | Facility: CLINIC | Age: 56
End: 2021-10-01
Payer: COMMERCIAL

## 2021-10-01 VITALS — BODY MASS INDEX: 31.28 KG/M2 | WEIGHT: 218.06 LBS

## 2021-10-04 ENCOUNTER — PATIENT MESSAGE (OUTPATIENT)
Dept: ADMINISTRATIVE | Facility: HOSPITAL | Age: 56
End: 2021-10-04

## 2021-10-22 DIAGNOSIS — E66.9 DIABETES MELLITUS TYPE 2 IN OBESE: Primary | ICD-10-CM

## 2021-10-22 DIAGNOSIS — E11.69 DIABETES MELLITUS TYPE 2 IN OBESE: Primary | ICD-10-CM

## 2021-10-25 ENCOUNTER — TELEPHONE (OUTPATIENT)
Dept: FAMILY MEDICINE | Facility: CLINIC | Age: 56
End: 2021-10-25
Payer: COMMERCIAL

## 2021-11-05 ENCOUNTER — CLINICAL SUPPORT (OUTPATIENT)
Dept: INTERNAL MEDICINE | Facility: CLINIC | Age: 56
End: 2021-11-05
Payer: COMMERCIAL

## 2021-11-05 VITALS — BODY MASS INDEX: 31 KG/M2 | WEIGHT: 216.06 LBS

## 2021-12-01 ENCOUNTER — LAB VISIT (OUTPATIENT)
Dept: LAB | Facility: HOSPITAL | Age: 56
End: 2021-12-01
Attending: FAMILY MEDICINE
Payer: COMMERCIAL

## 2021-12-01 ENCOUNTER — DOCUMENTATION ONLY (OUTPATIENT)
Dept: FAMILY MEDICINE | Facility: CLINIC | Age: 56
End: 2021-12-01
Payer: COMMERCIAL

## 2021-12-01 ENCOUNTER — OFFICE VISIT (OUTPATIENT)
Dept: FAMILY MEDICINE | Facility: CLINIC | Age: 56
End: 2021-12-01
Attending: FAMILY MEDICINE
Payer: COMMERCIAL

## 2021-12-01 ENCOUNTER — TELEPHONE (OUTPATIENT)
Dept: FAMILY MEDICINE | Facility: CLINIC | Age: 56
End: 2021-12-01
Payer: COMMERCIAL

## 2021-12-01 VITALS
SYSTOLIC BLOOD PRESSURE: 124 MMHG | DIASTOLIC BLOOD PRESSURE: 78 MMHG | HEART RATE: 63 BPM | WEIGHT: 219 LBS | BODY MASS INDEX: 31.35 KG/M2 | OXYGEN SATURATION: 99 % | HEIGHT: 70 IN

## 2021-12-01 DIAGNOSIS — Z12.11 SCREEN FOR COLON CANCER: ICD-10-CM

## 2021-12-01 DIAGNOSIS — E66.9 DIABETES MELLITUS TYPE 2 IN OBESE: ICD-10-CM

## 2021-12-01 DIAGNOSIS — Z00.00 ANNUAL PHYSICAL EXAM: ICD-10-CM

## 2021-12-01 DIAGNOSIS — E03.9 ACQUIRED HYPOTHYROIDISM: ICD-10-CM

## 2021-12-01 DIAGNOSIS — I10 ESSENTIAL HYPERTENSION: ICD-10-CM

## 2021-12-01 DIAGNOSIS — E11.69 DIABETES MELLITUS TYPE 2 IN OBESE: Primary | ICD-10-CM

## 2021-12-01 DIAGNOSIS — E11.69 DIABETES MELLITUS TYPE 2 IN OBESE: ICD-10-CM

## 2021-12-01 DIAGNOSIS — E66.9 DIABETES MELLITUS TYPE 2 IN OBESE: Primary | ICD-10-CM

## 2021-12-01 LAB
ALBUMIN SERPL BCP-MCNC: 4 G/DL (ref 3.5–5.2)
ALP SERPL-CCNC: 85 U/L (ref 55–135)
ALT SERPL W/O P-5'-P-CCNC: 52 U/L (ref 10–44)
ANION GAP SERPL CALC-SCNC: 14 MMOL/L (ref 8–16)
AST SERPL-CCNC: 27 U/L (ref 10–40)
BASOPHILS # BLD AUTO: 0.04 K/UL (ref 0–0.2)
BASOPHILS NFR BLD: 0.5 % (ref 0–1.9)
BILIRUB SERPL-MCNC: 0.4 MG/DL (ref 0.1–1)
BUN SERPL-MCNC: 13 MG/DL (ref 6–20)
CALCIUM SERPL-MCNC: 9.6 MG/DL (ref 8.7–10.5)
CHLORIDE SERPL-SCNC: 102 MMOL/L (ref 95–110)
CHOLEST SERPL-MCNC: 262 MG/DL (ref 120–199)
CHOLEST/HDLC SERPL: 6.9 {RATIO} (ref 2–5)
CO2 SERPL-SCNC: 24 MMOL/L (ref 23–29)
CREAT SERPL-MCNC: 0.8 MG/DL (ref 0.5–1.4)
DIFFERENTIAL METHOD: ABNORMAL
EOSINOPHIL # BLD AUTO: 0.2 K/UL (ref 0–0.5)
EOSINOPHIL NFR BLD: 1.7 % (ref 0–8)
ERYTHROCYTE [DISTWIDTH] IN BLOOD BY AUTOMATED COUNT: 13.2 % (ref 11.5–14.5)
EST. GFR  (AFRICAN AMERICAN): >60 ML/MIN/1.73 M^2
EST. GFR  (NON AFRICAN AMERICAN): >60 ML/MIN/1.73 M^2
ESTIMATED AVG GLUCOSE: 148 MG/DL (ref 68–131)
ESTIMATED AVG GLUCOSE: 148 MG/DL (ref 68–131)
GLUCOSE SERPL-MCNC: 137 MG/DL (ref 70–110)
HBA1C MFR BLD: 6.8 % (ref 4–5.6)
HBA1C MFR BLD: 6.8 % (ref 4–5.6)
HCT VFR BLD AUTO: 43.4 % (ref 40–54)
HDLC SERPL-MCNC: 38 MG/DL (ref 40–75)
HDLC SERPL: 14.5 % (ref 20–50)
HGB BLD-MCNC: 14.4 G/DL (ref 14–18)
IMM GRANULOCYTES # BLD AUTO: 0.05 K/UL (ref 0–0.04)
IMM GRANULOCYTES NFR BLD AUTO: 0.6 % (ref 0–0.5)
LDLC SERPL CALC-MCNC: 159.6 MG/DL (ref 63–159)
LYMPHOCYTES # BLD AUTO: 1.9 K/UL (ref 1–4.8)
LYMPHOCYTES NFR BLD: 22.3 % (ref 18–48)
MCH RBC QN AUTO: 31 PG (ref 27–31)
MCHC RBC AUTO-ENTMCNC: 33.2 G/DL (ref 32–36)
MCV RBC AUTO: 94 FL (ref 82–98)
MONOCYTES # BLD AUTO: 0.5 K/UL (ref 0.3–1)
MONOCYTES NFR BLD: 5.5 % (ref 4–15)
NEUTROPHILS # BLD AUTO: 6 K/UL (ref 1.8–7.7)
NEUTROPHILS NFR BLD: 69.4 % (ref 38–73)
NONHDLC SERPL-MCNC: 224 MG/DL
NRBC BLD-RTO: 0 /100 WBC
PLATELET # BLD AUTO: 270 K/UL (ref 150–450)
PMV BLD AUTO: 10.7 FL (ref 9.2–12.9)
POTASSIUM SERPL-SCNC: 4.7 MMOL/L (ref 3.5–5.1)
PROT SERPL-MCNC: 7 G/DL (ref 6–8.4)
RBC # BLD AUTO: 4.64 M/UL (ref 4.6–6.2)
SODIUM SERPL-SCNC: 140 MMOL/L (ref 136–145)
TRIGL SERPL-MCNC: 322 MG/DL (ref 30–150)
TSH SERPL DL<=0.005 MIU/L-ACNC: 2.2 UIU/ML (ref 0.4–4)
TSH SERPL DL<=0.005 MIU/L-ACNC: 2.2 UIU/ML (ref 0.4–4)
WBC # BLD AUTO: 8.66 K/UL (ref 3.9–12.7)

## 2021-12-01 PROCEDURE — 3060F PR POS MICROALBUMINURIA RESULT DOCUMENTED/REVIEW: ICD-10-PCS | Mod: CPTII,S$GLB,, | Performed by: FAMILY MEDICINE

## 2021-12-01 PROCEDURE — 80053 COMPREHEN METABOLIC PANEL: CPT | Performed by: FAMILY MEDICINE

## 2021-12-01 PROCEDURE — 99214 PR OFFICE/OUTPT VISIT, EST, LEVL IV, 30-39 MIN: ICD-10-PCS | Mod: S$GLB,,, | Performed by: FAMILY MEDICINE

## 2021-12-01 PROCEDURE — 85025 COMPLETE CBC W/AUTO DIFF WBC: CPT | Performed by: FAMILY MEDICINE

## 2021-12-01 PROCEDURE — 3072F LOW RISK FOR RETINOPATHY: CPT | Mod: CPTII,S$GLB,, | Performed by: FAMILY MEDICINE

## 2021-12-01 PROCEDURE — 83036 HEMOGLOBIN GLYCOSYLATED A1C: CPT | Performed by: FAMILY MEDICINE

## 2021-12-01 PROCEDURE — 99999 PR PBB SHADOW E&M-EST. PATIENT-LVL IV: CPT | Mod: PBBFAC,,, | Performed by: FAMILY MEDICINE

## 2021-12-01 PROCEDURE — 84443 ASSAY THYROID STIM HORMONE: CPT | Performed by: FAMILY MEDICINE

## 2021-12-01 PROCEDURE — 80061 LIPID PANEL: CPT | Performed by: FAMILY MEDICINE

## 2021-12-01 PROCEDURE — 3066F PR DOCUMENTATION OF TREATMENT FOR NEPHROPATHY: ICD-10-PCS | Mod: CPTII,S$GLB,, | Performed by: FAMILY MEDICINE

## 2021-12-01 PROCEDURE — 4010F PR ACE/ARB THEARPY RXD/TAKEN: ICD-10-PCS | Mod: CPTII,S$GLB,, | Performed by: FAMILY MEDICINE

## 2021-12-01 PROCEDURE — 36415 COLL VENOUS BLD VENIPUNCTURE: CPT | Mod: PO | Performed by: FAMILY MEDICINE

## 2021-12-01 PROCEDURE — 4010F ACE/ARB THERAPY RXD/TAKEN: CPT | Mod: CPTII,S$GLB,, | Performed by: FAMILY MEDICINE

## 2021-12-01 PROCEDURE — 3072F PR LOW RISK FOR RETINOPATHY: ICD-10-PCS | Mod: CPTII,S$GLB,, | Performed by: FAMILY MEDICINE

## 2021-12-01 PROCEDURE — 3066F NEPHROPATHY DOC TX: CPT | Mod: CPTII,S$GLB,, | Performed by: FAMILY MEDICINE

## 2021-12-01 PROCEDURE — 99214 OFFICE O/P EST MOD 30 MIN: CPT | Mod: S$GLB,,, | Performed by: FAMILY MEDICINE

## 2021-12-01 PROCEDURE — 99999 PR PBB SHADOW E&M-EST. PATIENT-LVL IV: ICD-10-PCS | Mod: PBBFAC,,, | Performed by: FAMILY MEDICINE

## 2021-12-01 PROCEDURE — 3060F POS MICROALBUMINURIA REV: CPT | Mod: CPTII,S$GLB,, | Performed by: FAMILY MEDICINE

## 2021-12-01 RX ORDER — GLIMEPIRIDE 4 MG/1
4 TABLET ORAL
Qty: 90 TABLET | Refills: 3 | Status: SHIPPED | OUTPATIENT
Start: 2021-12-01 | End: 2022-09-08 | Stop reason: SDUPTHER

## 2021-12-01 RX ORDER — METFORMIN HYDROCHLORIDE 500 MG/1
500 TABLET, EXTENDED RELEASE ORAL DAILY
Qty: 180 TABLET | Refills: 3 | Status: SHIPPED | OUTPATIENT
Start: 2021-12-01 | End: 2022-09-08 | Stop reason: SDUPTHER

## 2021-12-01 RX ORDER — GLIMEPIRIDE 2 MG/1
2 TABLET ORAL DAILY
COMMUNITY
Start: 2021-11-20 | End: 2021-12-01 | Stop reason: DRUGHIGH

## 2021-12-07 ENCOUNTER — PATIENT MESSAGE (OUTPATIENT)
Dept: FAMILY MEDICINE | Facility: CLINIC | Age: 56
End: 2021-12-07
Payer: COMMERCIAL

## 2021-12-09 RX ORDER — METHYLPREDNISOLONE 4 MG/1
TABLET ORAL
Qty: 1 EACH | Refills: 0 | Status: SHIPPED | OUTPATIENT
Start: 2021-12-09 | End: 2021-12-30

## 2022-01-21 ENCOUNTER — PATIENT MESSAGE (OUTPATIENT)
Dept: FAMILY MEDICINE | Facility: CLINIC | Age: 57
End: 2022-01-21
Payer: COMMERCIAL

## 2022-01-25 DIAGNOSIS — R21 RASH: Primary | ICD-10-CM

## 2022-01-29 ENCOUNTER — PATIENT MESSAGE (OUTPATIENT)
Dept: FAMILY MEDICINE | Facility: CLINIC | Age: 57
End: 2022-01-29
Payer: COMMERCIAL

## 2022-01-31 ENCOUNTER — OFFICE VISIT (OUTPATIENT)
Dept: URGENT CARE | Facility: CLINIC | Age: 57
End: 2022-01-31
Payer: COMMERCIAL

## 2022-01-31 VITALS
BODY MASS INDEX: 31.5 KG/M2 | RESPIRATION RATE: 16 BRPM | HEIGHT: 70 IN | OXYGEN SATURATION: 98 % | DIASTOLIC BLOOD PRESSURE: 87 MMHG | HEART RATE: 92 BPM | WEIGHT: 220 LBS | TEMPERATURE: 99 F | SYSTOLIC BLOOD PRESSURE: 149 MMHG

## 2022-01-31 DIAGNOSIS — B02.9 HERPES ZOSTER WITHOUT COMPLICATION: Primary | ICD-10-CM

## 2022-01-31 DIAGNOSIS — I10 PRIMARY HYPERTENSION: ICD-10-CM

## 2022-01-31 PROCEDURE — 99213 OFFICE O/P EST LOW 20 MIN: CPT | Mod: S$GLB,,, | Performed by: FAMILY MEDICINE

## 2022-01-31 PROCEDURE — 3077F SYST BP >= 140 MM HG: CPT | Mod: CPTII,S$GLB,, | Performed by: FAMILY MEDICINE

## 2022-01-31 PROCEDURE — 3008F BODY MASS INDEX DOCD: CPT | Mod: CPTII,S$GLB,, | Performed by: FAMILY MEDICINE

## 2022-01-31 PROCEDURE — 3079F DIAST BP 80-89 MM HG: CPT | Mod: CPTII,S$GLB,, | Performed by: FAMILY MEDICINE

## 2022-01-31 PROCEDURE — 3077F PR MOST RECENT SYSTOLIC BLOOD PRESSURE >= 140 MM HG: ICD-10-PCS | Mod: CPTII,S$GLB,, | Performed by: FAMILY MEDICINE

## 2022-01-31 PROCEDURE — 3008F PR BODY MASS INDEX (BMI) DOCUMENTED: ICD-10-PCS | Mod: CPTII,S$GLB,, | Performed by: FAMILY MEDICINE

## 2022-01-31 PROCEDURE — 3079F PR MOST RECENT DIASTOLIC BLOOD PRESSURE 80-89 MM HG: ICD-10-PCS | Mod: CPTII,S$GLB,, | Performed by: FAMILY MEDICINE

## 2022-01-31 PROCEDURE — 1159F PR MEDICATION LIST DOCUMENTED IN MEDICAL RECORD: ICD-10-PCS | Mod: CPTII,S$GLB,, | Performed by: FAMILY MEDICINE

## 2022-01-31 PROCEDURE — 99213 PR OFFICE/OUTPT VISIT, EST, LEVL III, 20-29 MIN: ICD-10-PCS | Mod: S$GLB,,, | Performed by: FAMILY MEDICINE

## 2022-01-31 PROCEDURE — 1159F MED LIST DOCD IN RCRD: CPT | Mod: CPTII,S$GLB,, | Performed by: FAMILY MEDICINE

## 2022-01-31 RX ORDER — GLIMEPIRIDE 2 MG/1
2 TABLET ORAL DAILY
COMMUNITY
Start: 2022-01-29 | End: 2022-06-08

## 2022-01-31 NOTE — PATIENT INSTRUCTIONS
Patient Education       High Blood Pressure Discharge Instructions   About this topic   The medical name for high blood pressure is hypertension. Your blood pressure is measured with 2 numbers. For example, you may hear the staff say your blood pressure is 130 over 80. High blood pressure cannot be cured. You must control it with drugs and lifestyle changes. High blood pressure puts you at risk for heart attack, stroke, and kidney disease.         What care is needed at home?   · Ask your doctor what you need to do when you go home. Make sure you ask questions if you do not understand what the doctor says. This way you will know what you need to do.  · Take all your medicines as ordered. Do not stop taking any of your regular medicines without talking to your doctor.  · Learn how to check your blood pressure at home, if your doctor suggests you do this.  What follow-up care is needed?   Your doctor may ask you to make visits to the office to check on your progress. Be sure to keep these visits.  What drugs may be needed?   The doctor may order drugs to help control your high blood pressure. Take your drugs as ordered. Do not take other prescription drugs or over-the-counter (OTC) drugs without talking to your doctor first.  Will physical activity be limited?   Talk to your doctor about the right amount of activity for you. Exercise may help you lose weight and lower your blood pressure.  What changes to diet are needed?   · Do not use salt on your food. Use herbs and spices to improve the taste.  · Eat less than 1,500 mg of sodium a day. Read food labels to see how much sodium is in a food.  · Limit coffee, tea, and soda to 2 cups (480 mL) a day.  · Limit beer, wine, and mixed drinks (alcohol) to 1 drink a day for women and 2 drinks a day for men.  · Eat lots of fruits, vegetables, and low-fat dairy products.  · Avoid fatty foods like fried foods or chips.  · Talk with your dietitian about the diet changes you need  and the number of calories you should eat each day.  What problems could happen?   · Heart attack, heart failure, or other heart problems  · Stroke  · Swelling of blood vessels  · Kidney failure  · Loss of eyesight  · Memory problems  · Fluid in the lungs  · Death  What can be done to prevent this health problem?   · Exercise regularly. Try to get at least 30 minutes of exercise most days of the week.  · Avoid weight gain.  · Stop smoking. Your doctor can tell you about stop smoking programs.  · Learn to lower stress.  When do I need to call the doctor?   · You have signs of a heart attack, which may include:  ? Severe chest pain, pressure, or discomfort with:  § Breathing trouble, sweating, upset stomach, or cold, clammy skin  § Pain in your arms, back, or jaw  § Worse pain with activity like walking up stairs  ? Fast or irregular heartbeat  ? Feeling dizzy, faint, or weak  · You have signs of stroke like sudden:  ? Numbness or weakness of the face, arm, or leg, especially on one side of the body  ? Confusion, trouble speaking, or understanding  ? Trouble seeing in one or both eyes  ? Trouble walking, dizziness, loss of balance, or coordination  ? Severe headache with no known cause  · You have a seizure or pass out.  · You have a severe headache with an upset stomach or throwing up.  · You have sudden, severe back pain.  · You have 2 home blood pressure readings higher than 180/120.  · Your urine is brown or bloody.  Teach Back: Helping You Understand   The Teach Back Method helps you understand the information we are giving you. After you talk with the staff, tell them in your own words what you learned. This helps to make sure the staff has described each thing clearly. It also helps to explain things that may have been confusing. Before going home, make sure you can do these:  · I can tell you about my condition.  · I can tell you what numbers are too high for my blood pressure.  · I can tell you what I will do  if I have signs of a heart attack or stroke.  Where can I learn more?   American Academy of Family Physicians  https://familydoctor.org/condition/high-blood-pressure/   American Academy of Family Physicians  https://familydoctor.org/the-dash-diet-healthy-eating-to-control-your-blood-pressure/   American Heart Association  http://www.heart.org/HEARTORG/Conditions/HighBloodPressure/AboutHighBloodPressure/About-High-Blood-Pressure_UC_002050_Article.jsp#.[email or web address]   NHS Choices  http://www.nhs.uk/conditions/blood-pressure-(high)/pages/introduction.aspx   [employer]  https://www.uptodate.com/contents/high-blood-pressure-in-adults-beyond-the-basics?source=see_link   Last Reviewed Date   2021-06-08  Consumer Information Use and Disclaimer   This information is not specific medical advice and does not replace information you receive from your health care provider. This is only a brief summary of general information. It does NOT include all information about conditions, illnesses, injuries, tests, procedures, treatments, therapies, discharge instructions or life-style choices that may apply to you. You must talk with your health care provider for complete information about your health and treatment options. This information should not be used to decide whether or not to accept your health care providers advice, instructions or recommendations. Only your health care provider has the knowledge and training to provide advice that is right for you.  Copyright   Copyright © 2021 [employer] and its affiliates and/or licensors. All rights reserved.  Patient Education       Shingles Discharge Instructions   About this topic   Shingles or herpes zoster is a painful skin rash. It is caused by a germ called varicella zoster virus.  Shingles infects a nerve and the skin that the nerve supplies. It most often affects only one side of the body. The face, around the eyes, and the forehead are often involved.  The pain, itching, and  rash can make people feel uncomfortable. First, you feel pain and then a rash appears as fluid-filled blisters. The fluid in the blisters can infect other people who have never had chicken pox or a vaccine. Those people will develop chicken pox, not shingles. It is important to keep the blisters covered until they dry, heal, and scabs form. Once the blisters dry, you cannot pass the infection to anyone. It may take 3 to 4 weeks for the scabs to fully heal.     What care is needed at home?   · Ask your doctor what you need to do when you go home. Make sure you ask questions if you do not understand what the doctor says.  · Take your drugs and apply creams and ointments as ordered by your doctor.  · Keep your blisters covered. Do not touch your dressing if you have not washed your hands. Change the dressing every day or if it gets soaked. Wash hands right away after you change your dressing.  · Do not touch or scratch the rash.  · Ask your doctor when it is safe to take a bath or shower.  · You may use mild soap and water to wash your blisters. Pat your skin dry with a clean, dry towel. Do not rub. Make sure not to scratch your blisters when drying yourself.  · Avoid cleaning your ears if your blisters are near the ear. You may scratch a blister inside your ear and worsen your condition.  What follow-up care is needed?   · Your doctor may ask you to make visits to the office to check on your progress. Be sure to keep these visits.  · You doctor may send you to a special doctor for nerves, eyes, or ears. Who you see depends on the part of your body the illness affects.  What drugs may be needed?   The doctor may order drugs to:  · Help with pain  · Relieve itchiness  · Fight the virus  · Keep your eyes moist  · Numb the affected area  Will physical activity be limited?   Your activity may be limited for a few days. You may not feel like going out if the rash can be seen by other people.  What problems could happen?    · Very bad pain that lasts after the rash goes away  · Some other infection  · Eye problems  · Hearing problems  When do I need to call the doctor?   · Very bad pain that is not helped by the drugs you are taking  · Eyesight changes  · Hearing problems  · Another infection in the lungs or brain  Helpful tips   · Wear loose-fitting clothes.  · Use nonstick bandages.  · Wear sunglasses when you go out to cover the affected eye and to keep it moist.  · Stay away from pregnant women, infants, cancer patients, and people with low immune defenses.  · If you see a rash or think that a painful part on your body may be caused by shingles, see you doctor right away. Early treatment may shorten the length and severity of the illness.  Teach Back: Helping You Understand   The Teach Back Method helps you understand the information we are giving you. After you talk with the staff, tell them in your own words what you learned. This helps to make sure the staff has described each thing clearly. It also helps to explain things that may have been confusing. Before going home, make sure you can do these:  · I can tell you about my condition.  · I can tell you how I will take care of my blisters.  · I can tell you what I will do if I have very bad pain or another infection.  Where can I learn more?   American Academy [employer] Dermatology  https://www.aad.org/public/diseases/a-z/shingles-overview   Center for Disease Control and Prevention  http://www.cdc.gov/shingles/about/overview.html   FamilyDoctor.org  http://familydoctor.org/familydoctor/en/diseases-conditions/shingles/treatment.html   Last Reviewed Date   2020-10-13  Consumer Information Use and Disclaimer   This information is not specific medical advice and does not replace information you receive from your health care provider. This is only a brief summary of general information. It does NOT include all information about conditions, illnesses, injuries, tests, procedures,  treatments, therapies, discharge instructions or life-style choices that may apply to you. You must talk with your health care provider for complete information about your health and treatment options. This information should not be used to decide whether or not to accept your health care providers advice, instructions or recommendations. Only your health care provider has the knowledge and training to provide advice that is right for you.  Copyright   Copyright © 2021 [employer] and its affiliates and/or licensors. All rights reserved.      Elevated Blood Pressure  Your blood pressure was elevated during your visit to the urgent care.  It was not so high that immediate care was needed but it is recommended that you monitor your blood pressure over the next week or two to make sure that it is not staying elevated.  Please have your blood pressure taken 2-3 times daily at different times of the day.  Write all of those blood pressures down and record the time that they were taken.  Keep all that information and take it with you to see your Primary Care Physician.  If your blood pressure is consistently above 140/90 you will need to follow up with your PCP more quickly.   It is important to minimize intake of salt, fried foods and caffeinated drinks. Also daily cardio(walking, running, treadmill, swimming, cycling) helps control blood pressure.     If you develop any chest pain, shortness of breadth, headache or weakness, immediately contact your doctor or go to ER.

## 2022-01-31 NOTE — PROGRESS NOTES
"Subjective:       Patient ID: Wily Rodrigues is a 56 y.o. male.    Vitals:  height is 5' 10" (1.778 m) and weight is 99.8 kg (220 lb). His temperature is 98.7 °F (37.1 °C). His blood pressure is 149/87 (abnormal) and his pulse is 92. His respiration is 16 and oxygen saturation is 98%.     Chief Complaint: Rash    Pt presents with possible shingles rash over R flank for last 3 days and has already been called Valtrex from his PCP, wants the condition documented. Denies abd pan, fever, weakness. Reports hx of chicken pox as child. BP is noted to be elevated. PT has hx of HTN. Denies CP,SOB, HA, weakness.     Rash  This is a new problem. The current episode started 1 to 4 weeks ago (1w). The problem has been gradually improving since onset. The affected locations include the torso (R). Pertinent negatives include no anorexia, congestion, cough, diarrhea, eye pain, facial edema, fatigue, fever, joint pain, nail changes, rhinorrhea, shortness of breath, sore throat or vomiting. Past treatments include topical steroids (Valtrex). His past medical history is significant for varicella.       Constitution: Negative for fatigue and fever.   HENT: Negative for congestion and sore throat.    Eyes: Negative for eye pain.   Respiratory: Negative for cough and shortness of breath.    Gastrointestinal: Negative for vomiting and diarrhea.   Skin: Positive for rash.   Neurological: Positive for tingling.       Objective:      Physical Exam   Constitutional: He is oriented to person, place, and time. He appears well-developed and well-nourished. He is cooperative.  Non-toxic appearance. He does not appear ill. No distress.   HENT:   Head: Normocephalic and atraumatic.   Ears:   Right Ear: Hearing normal.   Left Ear: Hearing normal.   Nose: Nose normal. No mucosal edema, rhinorrhea or nasal deformity. No epistaxis. Right sinus exhibits no maxillary sinus tenderness and no frontal sinus tenderness. Left sinus exhibits no maxillary " sinus tenderness and no frontal sinus tenderness.   Mouth/Throat: Uvula is midline, oropharynx is clear and moist and mucous membranes are normal. No trismus in the jaw. Normal dentition. No uvula swelling. No posterior oropharyngeal erythema.   Eyes: Conjunctivae and lids are normal. Right eye exhibits no discharge. Left eye exhibits no discharge. No scleral icterus.   Neck: Trachea normal and phonation normal. Neck supple.   Cardiovascular: Normal rate, regular rhythm, normal heart sounds, intact distal pulses and normal pulses.   No murmur heard.  Pulmonary/Chest: Effort normal and breath sounds normal. No stridor. No respiratory distress. He has no wheezes. He has no rhonchi. He has no rales.   Abdominal: Normal appearance and bowel sounds are normal. He exhibits no distension and no mass. Soft. There is no abdominal tenderness.   Musculoskeletal: Normal range of motion.         General: No deformity or edema. Normal range of motion.   Neurological: He is alert and oriented to person, place, and time. He exhibits normal muscle tone. Coordination normal.   Skin: Skin is warm, dry, intact, not diaphoretic, not pale and rash.         Comments: erythematous vesicular rash in a cluster over R falnk.   Psychiatric: He has a normal mood and affect. His speech is normal and behavior is normal. Judgment and thought content normal.   Nursing note and vitals reviewed.        Assessment:       1. Herpes zoster without complication    2. Primary hypertension          Plan:         Herpes zoster without complication    Primary hypertension        Patient Education       High Blood Pressure Discharge Instructions   About this topic   The medical name for high blood pressure is hypertension. Your blood pressure is measured with 2 numbers. For example, you may hear the staff say your blood pressure is 130 over 80. High blood pressure cannot be cured. You must control it with drugs and lifestyle changes. High blood pressure puts  you at risk for heart attack, stroke, and kidney disease.         What care is needed at home?   · Ask your doctor what you need to do when you go home. Make sure you ask questions if you do not understand what the doctor says. This way you will know what you need to do.  · Take all your medicines as ordered. Do not stop taking any of your regular medicines without talking to your doctor.  · Learn how to check your blood pressure at home, if your doctor suggests you do this.  What follow-up care is needed?   Your doctor may ask you to make visits to the office to check on your progress. Be sure to keep these visits.  What drugs may be needed?   The doctor may order drugs to help control your high blood pressure. Take your drugs as ordered. Do not take other prescription drugs or over-the-counter (OTC) drugs without talking to your doctor first.  Will physical activity be limited?   Talk to your doctor about the right amount of activity for you. Exercise may help you lose weight and lower your blood pressure.  What changes to diet are needed?   · Do not use salt on your food. Use herbs and spices to improve the taste.  · Eat less than 1,500 mg of sodium a day. Read food labels to see how much sodium is in a food.  · Limit coffee, tea, and soda to 2 cups (480 mL) a day.  · Limit beer, wine, and mixed drinks (alcohol) to 1 drink a day for women and 2 drinks a day for men.  · Eat lots of fruits, vegetables, and low-fat dairy products.  · Avoid fatty foods like fried foods or chips.  · Talk with your dietitian about the diet changes you need and the number of calories you should eat each day.  What problems could happen?   · Heart attack, heart failure, or other heart problems  · Stroke  · Swelling of blood vessels  · Kidney failure  · Loss of eyesight  · Memory problems  · Fluid in the lungs  · Death  What can be done to prevent this health problem?   · Exercise regularly. Try to get at least 30 minutes of exercise most  days of the week.  · Avoid weight gain.  · Stop smoking. Your doctor can tell you about stop smoking programs.  · Learn to lower stress.  When do I need to call the doctor?   · You have signs of a heart attack, which may include:  ? Severe chest pain, pressure, or discomfort with:  § Breathing trouble, sweating, upset stomach, or cold, clammy skin  § Pain in your arms, back, or jaw  § Worse pain with activity like walking up stairs  ? Fast or irregular heartbeat  ? Feeling dizzy, faint, or weak  · You have signs of stroke like sudden:  ? Numbness or weakness of the face, arm, or leg, especially on one side of the body  ? Confusion, trouble speaking, or understanding  ? Trouble seeing in one or both eyes  ? Trouble walking, dizziness, loss of balance, or coordination  ? Severe headache with no known cause  · You have a seizure or pass out.  · You have a severe headache with an upset stomach or throwing up.  · You have sudden, severe back pain.  · You have 2 home blood pressure readings higher than 180/120.  · Your urine is brown or bloody.  Teach Back: Helping You Understand   The Teach Back Method helps you understand the information we are giving you. After you talk with the staff, tell them in your own words what you learned. This helps to make sure the staff has described each thing clearly. It also helps to explain things that may have been confusing. Before going home, make sure you can do these:  · I can tell you about my condition.  · I can tell you what numbers are too high for my blood pressure.  · I can tell you what I will do if I have signs of a heart attack or stroke.  Where can I learn more?   American Academy of Family Physicians  https://familydoctor.org/condition/high-blood-pressure/   American Academy of Family Physicians  https://familydoctor.org/the-dash-diet-healthy-eating-to-control-your-blood-pressure/   American Heart  Association  http://www.heart.org/HEARTORG/Conditions/HighBloodPressure/AboutHighBloodPressure/About-High-Blood-Pressure_[facility]_002050_Article.jsp#.D1qP1GkqR3l   NHS Choices  http://www.nhs.uk/conditions/blood-pressure-(high)/pages/introduction.aspx   [employer]  https://www.uptodate.com/contents/high-blood-pressure-in-adults-beyond-the-basics?source=see_link   Last Reviewed Date   2021-06-08  Consumer Information Use and Disclaimer   This information is not specific medical advice and does not replace information you receive from your health care provider. This is only a brief summary of general information. It does NOT include all information about conditions, illnesses, injuries, tests, procedures, treatments, therapies, discharge instructions or life-style choices that may apply to you. You must talk with your health care provider for complete information about your health and treatment options. This information should not be used to decide whether or not to accept your health care providers advice, instructions or recommendations. Only your health care provider has the knowledge and training to provide advice that is right for you.  Copyright   Copyright © 2021 UpToDate, Inc. and its affiliates and/or licensors. All rights reserved.  Patient Education       Shingles Discharge Instructions   About this topic   Shingles or herpes zoster is a painful skin rash. It is caused by a germ called varicella zoster virus.  Shingles infects a nerve and the skin that the nerve supplies. It most often affects only one side of the body. The face, around the eyes, and the forehead are often involved.  The pain, itching, and rash can make people feel uncomfortable. First, you feel pain and then a rash appears as fluid-filled blisters. The fluid in the blisters can infect other people who have never had chicken pox or a vaccine. Those people will develop chicken pox, not shingles. It is important to keep the blisters covered until  they dry, heal, and scabs form. Once the blisters dry, you cannot pass the infection to anyone. It may take 3 to 4 weeks for the scabs to fully heal.     What care is needed at home?   · Ask your doctor what you need to do when you go home. Make sure you ask questions if you do not understand what the doctor says.  · Take your drugs and apply creams and ointments as ordered by your doctor.  · Keep your blisters covered. Do not touch your dressing if you have not washed your hands. Change the dressing every day or if it gets soaked. Wash hands right away after you change your dressing.  · Do not touch or scratch the rash.  · Ask your doctor when it is safe to take a bath or shower.  · You may use mild soap and water to wash your blisters. Pat your skin dry with a clean, dry towel. Do not rub. Make sure not to scratch your blisters when drying yourself.  · Avoid cleaning your ears if your blisters are near the ear. You may scratch a blister inside your ear and worsen your condition.  What follow-up care is needed?   · Your doctor may ask you to make visits to the office to check on your progress. Be sure to keep these visits.  · You doctor may send you to a special doctor for nerves, eyes, or ears. Who you see depends on the part of your body the illness affects.  What drugs may be needed?   The doctor may order drugs to:  · Help with pain  · Relieve itchiness  · Fight the virus  · Keep your eyes moist  · Numb the affected area  Will physical activity be limited?   Your activity may be limited for a few days. You may not feel like going out if the rash can be seen by other people.  What problems could happen?   · Very bad pain that lasts after the rash goes away  · Some other infection  · Eye problems  · Hearing problems  When do I need to call the doctor?   · Very bad pain that is not helped by the drugs you are taking  · Eyesight changes  · Hearing problems  · Another infection in the lungs or brain  Helpful tips    · Wear loose-fitting clothes.  · Use nonstick bandages.  · Wear sunglasses when you go out to cover the affected eye and to keep it moist.  · Stay away from pregnant women, infants, cancer patients, and people with low immune defenses.  · If you see a rash or think that a painful part on your body may be caused by shingles, see you doctor right away. Early treatment may shorten the length and severity of the illness.  Teach Back: Helping You Understand   The Teach Back Method helps you understand the information we are giving you. After you talk with the staff, tell them in your own words what you learned. This helps to make sure the staff has described each thing clearly. It also helps to explain things that may have been confusing. Before going home, make sure you can do these:  · I can tell you about my condition.  · I can tell you how I will take care of my blisters.  · I can tell you what I will do if I have very bad pain or another infection.  Where can I learn more?   American Academy of Dermatology  https://www.aad.org/public/diseases/a-z/shingles-overview   Center for Disease Control and Prevention  http://www.cdc.gov/shingles/about/overview.html   FamilyDoctor.org  http://familydoctor.org/familydoctor/en/diseases-conditions/shingles/treatment.html   Last Reviewed Date   2020-10-13  Consumer Information Use and Disclaimer   This information is not specific medical advice and does not replace information you receive from your health care provider. This is only a brief summary of general information. It does NOT include all information about conditions, illnesses, injuries, tests, procedures, treatments, therapies, discharge instructions or life-style choices that may apply to you. You must talk with your health care provider for complete information about your health and treatment options. This information should not be used to decide whether or not to accept your health care providers advice,  instructions or recommendations. Only your health care provider has the knowledge and training to provide advice that is right for you.  Copyright   Copyright © 2021 UpToDate, Inc. and its affiliates and/or licensors. All rights reserved.      Elevated Blood Pressure  Your blood pressure was elevated during your visit to the urgent care.  It was not so high that immediate care was needed but it is recommended that you monitor your blood pressure over the next week or two to make sure that it is not staying elevated.  Please have your blood pressure taken 2-3 times daily at different times of the day.  Write all of those blood pressures down and record the time that they were taken.  Keep all that information and take it with you to see your Primary Care Physician.  If your blood pressure is consistently above 140/90 you will need to follow up with your PCP more quickly.   It is important to minimize intake of salt, fried foods and caffeinated drinks. Also daily cardio(walking, running, treadmill, swimming, cycling) helps control blood pressure.     If you develop any chest pain, shortness of breadth, headache or weakness, immediately contact your doctor or go to ER.

## 2022-02-16 ENCOUNTER — PATIENT OUTREACH (OUTPATIENT)
Dept: ADMINISTRATIVE | Facility: HOSPITAL | Age: 57
End: 2022-02-16
Payer: COMMERCIAL

## 2022-02-16 ENCOUNTER — PATIENT MESSAGE (OUTPATIENT)
Dept: ADMINISTRATIVE | Facility: HOSPITAL | Age: 57
End: 2022-02-16
Payer: COMMERCIAL

## 2022-02-25 NOTE — TELEPHONE ENCOUNTER
No new care gaps identified.  Powered by [employer]. Reference number: [id number].   2/25/2022 2:10:16 AM CST

## 2022-03-01 RX ORDER — CETIRIZINE HYDROCHLORIDE 10 MG/1
10 TABLET ORAL DAILY
Qty: 90 TABLET | Refills: 3 | Status: SHIPPED | OUTPATIENT
Start: 2022-03-01 | End: 2022-12-26

## 2022-03-01 NOTE — TELEPHONE ENCOUNTER
Refill Authorization Note   Wily Rodrigues  is requesting a refill authorization.  Brief Assessment and Rationale for Refill:  Approve     Medication Therapy Plan:       Medication Reconciliation Completed: No   Comments:   --->Care Gap information included below if applicable.   Orders Placed This Encounter    cetirizine (ZYRTEC) 10 MG tablet      Requested Prescriptions   Signed Prescriptions Disp Refills    cetirizine (ZYRTEC) 10 MG tablet 90 tablet 3     Sig: Take 1 tablet (10 mg total) by mouth once daily.       Ear, Nose, and Throat:  Antihistamines Passed - 2/25/2022  2:09 AM        Passed - Patient is at least 18 years old        Passed - Valid encounter within last 15 months     Recent Visits  Date Type Provider Dept   12/01/21 Office Visit Areli Kramer MD [facility]   09/29/21 Office Visit Areli Kramer MD [facility]   09/10/21 Office Visit Areli Kramer MD [facility]   07/21/21 Office Visit Areli Kramer MD [facility]   11/27/20 Office Visit Areli Kramer MD [facility]   10/28/20 Office Visit Areli Kramer MD [facility]   05/01/20 Office Visit Areli Kramer MD [facility]   Showing recent visits within past 720 days and meeting all other requirements  Future Appointments  No visits were found meeting these conditions.  Showing future appointments within next 150 days and meeting all other requirements      Future Appointments              [facility]                    Appointments  past 12m or future 3m with PCP    Date Provider   Last Visit   12/1/2021 Areli Kramer MD   Next Visit   3/8/2022 Areli Kramer MD   ED visits in past 90 days: 0     Note composed:10:37 AM 03/01/2022

## 2022-03-08 ENCOUNTER — LAB VISIT (OUTPATIENT)
Dept: LAB | Facility: HOSPITAL | Age: 57
End: 2022-03-08
Attending: FAMILY MEDICINE
Payer: COMMERCIAL

## 2022-03-08 ENCOUNTER — OFFICE VISIT (OUTPATIENT)
Dept: FAMILY MEDICINE | Facility: CLINIC | Age: 57
End: 2022-03-08
Attending: FAMILY MEDICINE
Payer: COMMERCIAL

## 2022-03-08 ENCOUNTER — PATIENT MESSAGE (OUTPATIENT)
Dept: FAMILY MEDICINE | Facility: CLINIC | Age: 57
End: 2022-03-08

## 2022-03-08 VITALS
HEART RATE: 64 BPM | DIASTOLIC BLOOD PRESSURE: 70 MMHG | WEIGHT: 219 LBS | HEIGHT: 70 IN | SYSTOLIC BLOOD PRESSURE: 130 MMHG | OXYGEN SATURATION: 97 % | BODY MASS INDEX: 31.35 KG/M2

## 2022-03-08 DIAGNOSIS — I10 ESSENTIAL HYPERTENSION: ICD-10-CM

## 2022-03-08 DIAGNOSIS — E78.2 MIXED HYPERLIPIDEMIA: ICD-10-CM

## 2022-03-08 LAB
ALBUMIN SERPL BCP-MCNC: 4.2 G/DL (ref 3.5–5.2)
ALP SERPL-CCNC: 81 U/L (ref 55–135)
ALT SERPL W/O P-5'-P-CCNC: 73 U/L (ref 10–44)
ANION GAP SERPL CALC-SCNC: 11 MMOL/L (ref 8–16)
AST SERPL-CCNC: 36 U/L (ref 10–40)
BILIRUB SERPL-MCNC: 0.4 MG/DL (ref 0.1–1)
BUN SERPL-MCNC: 15 MG/DL (ref 6–20)
CALCIUM SERPL-MCNC: 10 MG/DL (ref 8.7–10.5)
CHLORIDE SERPL-SCNC: 99 MMOL/L (ref 95–110)
CO2 SERPL-SCNC: 26 MMOL/L (ref 23–29)
CREAT SERPL-MCNC: 0.7 MG/DL (ref 0.5–1.4)
EST. GFR  (AFRICAN AMERICAN): >60 ML/MIN/1.73 M^2
EST. GFR  (NON AFRICAN AMERICAN): >60 ML/MIN/1.73 M^2
ESTIMATED AVG GLUCOSE: 171 MG/DL (ref 68–131)
GLUCOSE SERPL-MCNC: 187 MG/DL (ref 70–110)
HBA1C MFR BLD: 7.6 % (ref 4–5.6)
POTASSIUM SERPL-SCNC: 4.8 MMOL/L (ref 3.5–5.1)
PROT SERPL-MCNC: 7.1 G/DL (ref 6–8.4)
SODIUM SERPL-SCNC: 136 MMOL/L (ref 136–145)

## 2022-03-08 PROCEDURE — 83036 HEMOGLOBIN GLYCOSYLATED A1C: CPT | Performed by: FAMILY MEDICINE

## 2022-03-08 PROCEDURE — 99999 PR PBB SHADOW E&M-EST. PATIENT-LVL IV: CPT | Mod: PBBFAC,,, | Performed by: FAMILY MEDICINE

## 2022-03-08 PROCEDURE — 99214 OFFICE O/P EST MOD 30 MIN: CPT | Mod: S$GLB,,, | Performed by: FAMILY MEDICINE

## 2022-03-08 PROCEDURE — 3078F PR MOST RECENT DIASTOLIC BLOOD PRESSURE < 80 MM HG: ICD-10-PCS | Mod: CPTII,S$GLB,, | Performed by: FAMILY MEDICINE

## 2022-03-08 PROCEDURE — 3075F PR MOST RECENT SYSTOLIC BLOOD PRESS GE 130-139MM HG: ICD-10-PCS | Mod: CPTII,S$GLB,, | Performed by: FAMILY MEDICINE

## 2022-03-08 PROCEDURE — 4010F PR ACE/ARB THEARPY RXD/TAKEN: ICD-10-PCS | Mod: CPTII,S$GLB,, | Performed by: FAMILY MEDICINE

## 2022-03-08 PROCEDURE — 99214 PR OFFICE/OUTPT VISIT, EST, LEVL IV, 30-39 MIN: ICD-10-PCS | Mod: S$GLB,,, | Performed by: FAMILY MEDICINE

## 2022-03-08 PROCEDURE — 4010F ACE/ARB THERAPY RXD/TAKEN: CPT | Mod: CPTII,S$GLB,, | Performed by: FAMILY MEDICINE

## 2022-03-08 PROCEDURE — 1159F PR MEDICATION LIST DOCUMENTED IN MEDICAL RECORD: ICD-10-PCS | Mod: CPTII,S$GLB,, | Performed by: FAMILY MEDICINE

## 2022-03-08 PROCEDURE — 1159F MED LIST DOCD IN RCRD: CPT | Mod: CPTII,S$GLB,, | Performed by: FAMILY MEDICINE

## 2022-03-08 PROCEDURE — 36415 COLL VENOUS BLD VENIPUNCTURE: CPT | Mod: PO | Performed by: FAMILY MEDICINE

## 2022-03-08 PROCEDURE — 3075F SYST BP GE 130 - 139MM HG: CPT | Mod: CPTII,S$GLB,, | Performed by: FAMILY MEDICINE

## 2022-03-08 PROCEDURE — 99999 PR PBB SHADOW E&M-EST. PATIENT-LVL IV: ICD-10-PCS | Mod: PBBFAC,,, | Performed by: FAMILY MEDICINE

## 2022-03-08 PROCEDURE — 3078F DIAST BP <80 MM HG: CPT | Mod: CPTII,S$GLB,, | Performed by: FAMILY MEDICINE

## 2022-03-08 PROCEDURE — 3008F PR BODY MASS INDEX (BMI) DOCUMENTED: ICD-10-PCS | Mod: CPTII,S$GLB,, | Performed by: FAMILY MEDICINE

## 2022-03-08 PROCEDURE — 80053 COMPREHEN METABOLIC PANEL: CPT | Performed by: FAMILY MEDICINE

## 2022-03-08 PROCEDURE — 3008F BODY MASS INDEX DOCD: CPT | Mod: CPTII,S$GLB,, | Performed by: FAMILY MEDICINE

## 2022-03-08 NOTE — PROGRESS NOTES
"Subjective:       Patient ID: Wily Rodrigues is a 57 y.o. male.    Chief Complaint: Diabetes    HPI   Pt is here for dm follow up he has not been on an ada diet he is taking his medications on amaryl metformin he does not know his fbs   Pt has htn bp fine today on ace no cough no sob/cp  Pt has hypercholesterolemia on statin no muscle aches     Review of Systems   Constitutional: Negative for chills, fatigue and fever.   Respiratory: Negative for cough, chest tightness and shortness of breath.    Cardiovascular: Negative for chest pain and palpitations.   Gastrointestinal: Negative for abdominal distention, abdominal pain and blood in stool.   Endocrine: Negative for polydipsia and polyuria.       Objective:     /70   Pulse 64   Ht 5' 10" (1.778 m)   Wt 99.3 kg (219 lb)   SpO2 97%   BMI 31.42 kg/m²     Physical Exam  Constitutional:       Appearance: Normal appearance. He is not ill-appearing.   Cardiovascular:      Rate and Rhythm: Normal rate and regular rhythm.      Heart sounds:     No gallop.   Pulmonary:      Effort: Pulmonary effort is normal.      Breath sounds: Normal breath sounds. No rales.   Abdominal:      General: There is no distension.      Palpations: Abdomen is soft.      Tenderness: There is no abdominal tenderness.   Neurological:      General: No focal deficit present.      Mental Status: He is alert and oriented to person, place, and time.      Cranial Nerves: No cranial nerve deficit.      Coordination: Coordination normal.       hgb a1c 6.8 on 12/01/2021  Assessment:       1. Uncontrolled type 2 diabetes mellitus with complication, without long-term current use of insulin    2. Essential hypertension      3. Mixed hyperlipidemia   Plan:     orders cmp hgb a1c  Cont meds  Ada diet  Graded exercise  rtc quarterly       "This note will not be shared with the patient."     "

## 2022-05-09 ENCOUNTER — PATIENT MESSAGE (OUTPATIENT)
Dept: FAMILY MEDICINE | Facility: CLINIC | Age: 57
End: 2022-05-09
Payer: COMMERCIAL

## 2022-05-10 ENCOUNTER — PATIENT MESSAGE (OUTPATIENT)
Dept: FAMILY MEDICINE | Facility: CLINIC | Age: 57
End: 2022-05-10
Payer: COMMERCIAL

## 2022-05-11 DIAGNOSIS — E11.9 TYPE 2 DIABETES MELLITUS WITHOUT COMPLICATION, UNSPECIFIED WHETHER LONG TERM INSULIN USE: ICD-10-CM

## 2022-05-12 ENCOUNTER — PATIENT MESSAGE (OUTPATIENT)
Dept: FAMILY MEDICINE | Facility: CLINIC | Age: 57
End: 2022-05-12
Payer: COMMERCIAL

## 2022-05-17 ENCOUNTER — OFFICE VISIT (OUTPATIENT)
Dept: URGENT CARE | Facility: CLINIC | Age: 57
End: 2022-05-17
Payer: COMMERCIAL

## 2022-05-17 VITALS
OXYGEN SATURATION: 98 % | BODY MASS INDEX: 31.35 KG/M2 | WEIGHT: 219 LBS | HEIGHT: 70 IN | TEMPERATURE: 98 F | SYSTOLIC BLOOD PRESSURE: 174 MMHG | HEART RATE: 107 BPM | RESPIRATION RATE: 18 BRPM | DIASTOLIC BLOOD PRESSURE: 97 MMHG

## 2022-05-17 DIAGNOSIS — E11.9 TYPE 2 DIABETES MELLITUS WITHOUT COMPLICATION, WITHOUT LONG-TERM CURRENT USE OF INSULIN: ICD-10-CM

## 2022-05-17 DIAGNOSIS — J44.9 CHRONIC OBSTRUCTIVE PULMONARY DISEASE, UNSPECIFIED COPD TYPE: ICD-10-CM

## 2022-05-17 DIAGNOSIS — J30.9 ALLERGIC RHINITIS, UNSPECIFIED SEASONALITY, UNSPECIFIED TRIGGER: ICD-10-CM

## 2022-05-17 DIAGNOSIS — J06.9 VIRAL URI: Primary | ICD-10-CM

## 2022-05-17 DIAGNOSIS — J02.9 SORE THROAT: ICD-10-CM

## 2022-05-17 LAB
CTP QC/QA: YES
GLUCOSE SERPL-MCNC: 302 MG/DL (ref 70–110)
SARS-COV-2 RDRP RESP QL NAA+PROBE: NEGATIVE

## 2022-05-17 PROCEDURE — 3080F PR MOST RECENT DIASTOLIC BLOOD PRESSURE >= 90 MM HG: ICD-10-PCS | Mod: CPTII,S$GLB,, | Performed by: FAMILY MEDICINE

## 2022-05-17 PROCEDURE — 3051F HG A1C>EQUAL 7.0%<8.0%: CPT | Mod: CPTII,S$GLB,, | Performed by: FAMILY MEDICINE

## 2022-05-17 PROCEDURE — U0002: ICD-10-PCS | Mod: QW,S$GLB,, | Performed by: FAMILY MEDICINE

## 2022-05-17 PROCEDURE — 1159F PR MEDICATION LIST DOCUMENTED IN MEDICAL RECORD: ICD-10-PCS | Mod: CPTII,S$GLB,, | Performed by: FAMILY MEDICINE

## 2022-05-17 PROCEDURE — 3077F SYST BP >= 140 MM HG: CPT | Mod: CPTII,S$GLB,, | Performed by: FAMILY MEDICINE

## 2022-05-17 PROCEDURE — 1159F MED LIST DOCD IN RCRD: CPT | Mod: CPTII,S$GLB,, | Performed by: FAMILY MEDICINE

## 2022-05-17 PROCEDURE — 99214 PR OFFICE/OUTPT VISIT, EST, LEVL IV, 30-39 MIN: ICD-10-PCS | Mod: S$GLB,,, | Performed by: FAMILY MEDICINE

## 2022-05-17 PROCEDURE — 82962 POCT GLUCOSE, HAND-HELD DEVICE: ICD-10-PCS | Mod: S$GLB,,, | Performed by: FAMILY MEDICINE

## 2022-05-17 PROCEDURE — 4010F PR ACE/ARB THEARPY RXD/TAKEN: ICD-10-PCS | Mod: CPTII,S$GLB,, | Performed by: FAMILY MEDICINE

## 2022-05-17 PROCEDURE — 1160F RVW MEDS BY RX/DR IN RCRD: CPT | Mod: CPTII,S$GLB,, | Performed by: FAMILY MEDICINE

## 2022-05-17 PROCEDURE — 99214 OFFICE O/P EST MOD 30 MIN: CPT | Mod: S$GLB,,, | Performed by: FAMILY MEDICINE

## 2022-05-17 PROCEDURE — 3051F PR MOST RECENT HEMOGLOBIN A1C LEVEL 7.0 - < 8.0%: ICD-10-PCS | Mod: CPTII,S$GLB,, | Performed by: FAMILY MEDICINE

## 2022-05-17 PROCEDURE — 4010F ACE/ARB THERAPY RXD/TAKEN: CPT | Mod: CPTII,S$GLB,, | Performed by: FAMILY MEDICINE

## 2022-05-17 PROCEDURE — 82962 GLUCOSE BLOOD TEST: CPT | Mod: S$GLB,,, | Performed by: FAMILY MEDICINE

## 2022-05-17 PROCEDURE — 3008F BODY MASS INDEX DOCD: CPT | Mod: CPTII,S$GLB,, | Performed by: FAMILY MEDICINE

## 2022-05-17 PROCEDURE — 3008F PR BODY MASS INDEX (BMI) DOCUMENTED: ICD-10-PCS | Mod: CPTII,S$GLB,, | Performed by: FAMILY MEDICINE

## 2022-05-17 PROCEDURE — 3077F PR MOST RECENT SYSTOLIC BLOOD PRESSURE >= 140 MM HG: ICD-10-PCS | Mod: CPTII,S$GLB,, | Performed by: FAMILY MEDICINE

## 2022-05-17 PROCEDURE — 1160F PR REVIEW ALL MEDS BY PRESCRIBER/CLIN PHARMACIST DOCUMENTED: ICD-10-PCS | Mod: CPTII,S$GLB,, | Performed by: FAMILY MEDICINE

## 2022-05-17 PROCEDURE — U0002 COVID-19 LAB TEST NON-CDC: HCPCS | Mod: QW,S$GLB,, | Performed by: FAMILY MEDICINE

## 2022-05-17 PROCEDURE — 3080F DIAST BP >= 90 MM HG: CPT | Mod: CPTII,S$GLB,, | Performed by: FAMILY MEDICINE

## 2022-05-17 RX ORDER — BENZONATATE 200 MG/1
200 CAPSULE ORAL 3 TIMES DAILY PRN
Qty: 30 CAPSULE | Refills: 0 | Status: SHIPPED | OUTPATIENT
Start: 2022-05-17 | End: 2022-11-05

## 2022-05-17 RX ORDER — ALBUTEROL SULFATE 90 UG/1
2 AEROSOL, METERED RESPIRATORY (INHALATION) EVERY 4 HOURS PRN
Qty: 18 G | Refills: 1 | Status: SHIPPED | OUTPATIENT
Start: 2022-05-17

## 2022-05-17 RX ORDER — FLUTICASONE PROPIONATE 50 MCG
SPRAY, SUSPENSION (ML) NASAL
Qty: 16 G | Refills: 8 | Status: SHIPPED | OUTPATIENT
Start: 2022-05-17

## 2022-05-17 NOTE — PATIENT INSTRUCTIONS
TRY A NONSEDATING ANTIHISTAMINE SUCH AS ZYRTEC OR CLARITAN OR ALLEGRA OR XYZAL (OR GENERICS FOR THESE) DAILY AS NEEDED.     TRY USING FLONASE, 2 INHALATIONS EACH NOSTRIL 1-2 TIMES DAILY, AND ON A REGULAR BASIS DURING DIFFICULT TIMES TO ADDRESS YOUR ALLERGY SYMPTOMS.       DO NOT HESITATE TO USE YOUR ALBUTEROL INHALER, 2 PUFFS EVERY 4 HOURS AS NEEDED FOR WHEEZING.    IS IMPORTANT FOR YOU TO CHECK YOUR BLOOD SUGAR DAILY, DIARY THIS RESULT, AND FOLLOW UP WITH YOUR PRIMARY CARE PROVIDER REGARDING THESE RESULTS AS SHE IS REQUESTING    Make sure that you follow up with your primary care doctor in the next 2-5 days if needed .  Return to the Urgent Care if signs or symptoms change and certainly if you have worsening symptoms go to the nearest emergency department for further evaluation.

## 2022-05-17 NOTE — PROGRESS NOTES
"Subjective:       Patient ID: Wily Rodrigues is a 57 y.o. male.    Vitals:  height is 5' 10" (1.778 m) and weight is 99.3 kg (219 lb). His temperature is 98.3 °F (36.8 °C). His blood pressure is 174/97 (abnormal) and his pulse is 107. His respiration is 18 and oxygen saturation is 98%.     Chief Complaint: Chest Congestion and Sore Throat    57-year-old male with hypertension, diabetes and COPD.  Presents with five-day history of irritated throat and chest congestion.  He has an albuterol inhaler that he used for the first time today.  No fever.  No known close contact COVID exposures.  He is requesting a steroid shot and a Medrol Dosepak.  Not checking his blood sugars regularly at this time.  Blood sugar today found to be 302. No Chest pain or palpitations.  Cough is nonproductive.  No colored nasal drainage or sputum.  History of allergic rhinitis, currently on generic Zyrtec.    Cough  This is a new problem. The current episode started in the past 7 days. The problem has been unchanged. The problem occurs constantly. The cough is non-productive. Associated symptoms include chills, nasal congestion, postnasal drip, a sore throat and wheezing. Pertinent negatives include no chest pain, ear congestion, ear pain, fever, headaches, heartburn, hemoptysis, myalgias, rash, rhinorrhea, shortness of breath, sweats or weight loss. Exacerbated by: Pt states aggravated by deep breathing.  Treatments tried: Albuterol Inhaler  The treatment provided mild relief. His past medical history is significant for asthma. There is no history of bronchiectasis, bronchitis, COPD, emphysema, environmental allergies or pneumonia.       Constitution: Positive for chills and fatigue. Negative for fever.   HENT: Positive for postnasal drip and sore throat. Negative for ear pain.    Cardiovascular: Negative for chest pain.   Respiratory: Positive for cough and wheezing. Negative for bloody sputum and shortness of breath.  "   Gastrointestinal: Negative for heartburn.   Musculoskeletal: Negative for muscle ache.   Skin: Negative for rash.   Allergic/Immunologic: Negative for environmental allergies.   Neurological: Negative for headaches.       Objective:      Physical Exam   Constitutional: He is oriented to person, place, and time. He appears well-developed.  Non-toxic appearance. No distress.      Comments:Appears fatigued     HENT:   Head: Normocephalic and atraumatic.   Ears:   Right Ear: External ear normal.   Left Ear: Tympanic membrane and external ear normal.   Mouth/Throat: Oropharynx is clear and moist. No oropharyngeal exudate or posterior oropharyngeal erythema.      Comments: Posterior pharynx with cobblestoning, otherwise negative  Eyes: Conjunctivae and EOM are normal. Pupils are equal, round, and reactive to light.   Cardiovascular: Normal rate, regular rhythm and normal heart sounds.   Pulmonary/Chest: Effort normal and breath sounds normal. No stridor. No respiratory distress. He has no wheezes. He has no rhonchi. He has no rales.         Comments: Clear to auscultation bilaterally.  Forced exhalation does trigger cough.  No rales     Abdominal: He exhibits no distension. Soft. There is no abdominal tenderness. There is no rebound and no guarding.   Lymphadenopathy:     He has no cervical adenopathy.   Neurological: He is alert and oriented to person, place, and time.   Skin: Skin is warm, dry and not diaphoretic.   Psychiatric: His behavior is normal. Judgment and thought content normal.   Vitals reviewed.        Results for orders placed or performed in visit on 05/17/22   POCT COVID-19 Rapid Screening   Result Value Ref Range    POC Rapid COVID Negative Negative     Acceptable Yes    POCT Glucose, Hand-Held Device   Result Value Ref Range    POC Glucose 302 (A) 70 - 110 MG/DL     Assessment:       1. Viral URI    2. Sore throat    3. Type 2 diabetes mellitus without complication, without long-term  current use of insulin    4. Allergic rhinitis, unspecified seasonality, unspecified trigger    5. Chronic obstructive pulmonary disease, unspecified COPD type          Plan:     -- discussed problems steroid shot/pills at this time, especially in regards to blood sugars, and not advised    Viral URI  -     benzonatate (TESSALON) 200 MG capsule; Take 1 capsule (200 mg total) by mouth 3 (three) times daily as needed for Cough.  Dispense: 30 capsule; Refill: 0    Sore throat  -     POCT COVID-19 Rapid Screening    Type 2 diabetes mellitus without complication, without long-term current use of insulin  -     POCT Glucose, Hand-Held Device    Allergic rhinitis, unspecified seasonality, unspecified trigger  -     fluticasone propionate (FLONASE) 50 mcg/actuation nasal spray; Two sprays each nostril twice daily  Dispense: 16 g; Refill: 8    Chronic obstructive pulmonary disease, unspecified COPD type  -     albuterol (PROVENTIL/VENTOLIN HFA) 90 mcg/actuation inhaler; Inhale 2 puffs into the lungs every 4 (four) hours as needed for Wheezing.  Dispense: 18 g; Refill: 1    TRY A NONSEDATING ANTIHISTAMINE SUCH AS ZYRTEC OR CLARITAN OR ALLEGRA OR XYZAL (OR GENERICS FOR THESE) DAILY AS NEEDED.     TRY USING FLONASE, 2 INHALATIONS EACH NOSTRIL 1-2 TIMES DAILY, AND ON A REGULAR BASIS DURING DIFFICULT TIMES TO ADDRESS YOUR ALLERGY SYMPTOMS.       DO NOT HESITATE TO USE YOUR ALBUTEROL INHALER, 2 PUFFS EVERY 4 HOURS AS NEEDED FOR WHEEZING.    IS IMPORTANT FOR YOU TO CHECK YOUR BLOOD SUGAR DAILY, DIARY THIS RESULT, AND FOLLOW UP WITH YOUR PRIMARY CARE PROVIDER REGARDING THESE RESULTS AS SHE IS REQUESTING    Make sure that you follow up with your primary care doctor in the next 2-5 days if needed .  Return to the Urgent Care if signs or symptoms change and certainly if you have worsening symptoms go to the nearest emergency department for further evaluation.

## 2022-05-20 RX ORDER — VALACYCLOVIR HYDROCHLORIDE 1 G/1
TABLET, FILM COATED ORAL
Qty: 30 TABLET | Refills: 2 | OUTPATIENT
Start: 2022-05-20

## 2022-05-20 NOTE — TELEPHONE ENCOUNTER
Chris DC. Request already responded to by other means (e.g. phone or fax)   Refill Authorization Note   Wily Lilia  is requesting a refill authorization.  Brief Assessment and Rationale for Refill:  Quick Discontinue  Medication Therapy Plan:  Request responded by PCP    Medication Reconciliation Completed:  No      Comments:     Note composed:4:34 PM 05/20/2022

## 2022-05-22 ENCOUNTER — PATIENT MESSAGE (OUTPATIENT)
Dept: FAMILY MEDICINE | Facility: CLINIC | Age: 57
End: 2022-05-22
Payer: COMMERCIAL

## 2022-06-08 ENCOUNTER — OFFICE VISIT (OUTPATIENT)
Dept: FAMILY MEDICINE | Facility: CLINIC | Age: 57
End: 2022-06-08
Attending: FAMILY MEDICINE
Payer: COMMERCIAL

## 2022-06-08 ENCOUNTER — LAB VISIT (OUTPATIENT)
Dept: LAB | Facility: HOSPITAL | Age: 57
End: 2022-06-08
Attending: FAMILY MEDICINE
Payer: COMMERCIAL

## 2022-06-08 VITALS
DIASTOLIC BLOOD PRESSURE: 83 MMHG | WEIGHT: 216 LBS | BODY MASS INDEX: 30.92 KG/M2 | HEIGHT: 70 IN | SYSTOLIC BLOOD PRESSURE: 152 MMHG | HEART RATE: 79 BPM

## 2022-06-08 DIAGNOSIS — E78.2 MIXED HYPERLIPIDEMIA: ICD-10-CM

## 2022-06-08 DIAGNOSIS — I10 ESSENTIAL HYPERTENSION: ICD-10-CM

## 2022-06-08 LAB
ALBUMIN SERPL BCP-MCNC: 4.3 G/DL (ref 3.5–5.2)
ALP SERPL-CCNC: 91 U/L (ref 55–135)
ALT SERPL W/O P-5'-P-CCNC: 79 U/L (ref 10–44)
ANION GAP SERPL CALC-SCNC: 12 MMOL/L (ref 8–16)
AST SERPL-CCNC: 40 U/L (ref 10–40)
BILIRUB SERPL-MCNC: 0.7 MG/DL (ref 0.1–1)
BUN SERPL-MCNC: 15 MG/DL (ref 6–20)
CALCIUM SERPL-MCNC: 9.6 MG/DL (ref 8.7–10.5)
CHLORIDE SERPL-SCNC: 101 MMOL/L (ref 95–110)
CHOLEST SERPL-MCNC: 218 MG/DL (ref 120–199)
CHOLEST/HDLC SERPL: 6.4 {RATIO} (ref 2–5)
CO2 SERPL-SCNC: 22 MMOL/L (ref 23–29)
CREAT SERPL-MCNC: 0.9 MG/DL (ref 0.5–1.4)
EST. GFR  (AFRICAN AMERICAN): >60 ML/MIN/1.73 M^2
EST. GFR  (NON AFRICAN AMERICAN): >60 ML/MIN/1.73 M^2
ESTIMATED AVG GLUCOSE: 220 MG/DL (ref 68–131)
GLUCOSE SERPL-MCNC: 306 MG/DL (ref 70–110)
HBA1C MFR BLD: 9.3 % (ref 4–5.6)
HDLC SERPL-MCNC: 34 MG/DL (ref 40–75)
HDLC SERPL: 15.6 % (ref 20–50)
LDLC SERPL CALC-MCNC: ABNORMAL MG/DL (ref 63–159)
NONHDLC SERPL-MCNC: 184 MG/DL
POTASSIUM SERPL-SCNC: 4.4 MMOL/L (ref 3.5–5.1)
PROT SERPL-MCNC: 7.5 G/DL (ref 6–8.4)
SODIUM SERPL-SCNC: 135 MMOL/L (ref 136–145)
TRIGL SERPL-MCNC: 447 MG/DL (ref 30–150)

## 2022-06-08 PROCEDURE — 3079F PR MOST RECENT DIASTOLIC BLOOD PRESSURE 80-89 MM HG: ICD-10-PCS | Mod: CPTII,S$GLB,, | Performed by: FAMILY MEDICINE

## 2022-06-08 PROCEDURE — 99214 PR OFFICE/OUTPT VISIT, EST, LEVL IV, 30-39 MIN: ICD-10-PCS | Mod: S$GLB,,, | Performed by: FAMILY MEDICINE

## 2022-06-08 PROCEDURE — 3008F BODY MASS INDEX DOCD: CPT | Mod: CPTII,S$GLB,, | Performed by: FAMILY MEDICINE

## 2022-06-08 PROCEDURE — 1159F PR MEDICATION LIST DOCUMENTED IN MEDICAL RECORD: ICD-10-PCS | Mod: CPTII,S$GLB,, | Performed by: FAMILY MEDICINE

## 2022-06-08 PROCEDURE — 3051F PR MOST RECENT HEMOGLOBIN A1C LEVEL 7.0 - < 8.0%: ICD-10-PCS | Mod: CPTII,S$GLB,, | Performed by: FAMILY MEDICINE

## 2022-06-08 PROCEDURE — 80061 LIPID PANEL: CPT | Performed by: FAMILY MEDICINE

## 2022-06-08 PROCEDURE — 99214 OFFICE O/P EST MOD 30 MIN: CPT | Mod: S$GLB,,, | Performed by: FAMILY MEDICINE

## 2022-06-08 PROCEDURE — 36415 COLL VENOUS BLD VENIPUNCTURE: CPT | Mod: PO | Performed by: FAMILY MEDICINE

## 2022-06-08 PROCEDURE — 1159F MED LIST DOCD IN RCRD: CPT | Mod: CPTII,S$GLB,, | Performed by: FAMILY MEDICINE

## 2022-06-08 PROCEDURE — 83036 HEMOGLOBIN GLYCOSYLATED A1C: CPT | Performed by: FAMILY MEDICINE

## 2022-06-08 PROCEDURE — 3051F HG A1C>EQUAL 7.0%<8.0%: CPT | Mod: CPTII,S$GLB,, | Performed by: FAMILY MEDICINE

## 2022-06-08 PROCEDURE — 3077F PR MOST RECENT SYSTOLIC BLOOD PRESSURE >= 140 MM HG: ICD-10-PCS | Mod: CPTII,S$GLB,, | Performed by: FAMILY MEDICINE

## 2022-06-08 PROCEDURE — 3008F PR BODY MASS INDEX (BMI) DOCUMENTED: ICD-10-PCS | Mod: CPTII,S$GLB,, | Performed by: FAMILY MEDICINE

## 2022-06-08 PROCEDURE — 99999 PR PBB SHADOW E&M-EST. PATIENT-LVL V: ICD-10-PCS | Mod: PBBFAC,,, | Performed by: FAMILY MEDICINE

## 2022-06-08 PROCEDURE — 4010F PR ACE/ARB THEARPY RXD/TAKEN: ICD-10-PCS | Mod: CPTII,S$GLB,, | Performed by: FAMILY MEDICINE

## 2022-06-08 PROCEDURE — 3079F DIAST BP 80-89 MM HG: CPT | Mod: CPTII,S$GLB,, | Performed by: FAMILY MEDICINE

## 2022-06-08 PROCEDURE — 4010F ACE/ARB THERAPY RXD/TAKEN: CPT | Mod: CPTII,S$GLB,, | Performed by: FAMILY MEDICINE

## 2022-06-08 PROCEDURE — 3077F SYST BP >= 140 MM HG: CPT | Mod: CPTII,S$GLB,, | Performed by: FAMILY MEDICINE

## 2022-06-08 PROCEDURE — 99999 PR PBB SHADOW E&M-EST. PATIENT-LVL V: CPT | Mod: PBBFAC,,, | Performed by: FAMILY MEDICINE

## 2022-06-08 PROCEDURE — 80053 COMPREHEN METABOLIC PANEL: CPT | Performed by: FAMILY MEDICINE

## 2022-06-08 NOTE — PROGRESS NOTES
"Subjective:       Patient ID: Wily Rodrigues is a 57 y.o. male.    Chief Complaint: Diabetes    HPI   Pt is here for follow up of dm stable on metformin amaryl no adverse gi side effects no hypoglycemia   Pt has htn stable on ace no cough bp fine on repeat in line with at home no sob/cp   Pt has hypercholesterolemia on statin no muscle aches   Review of Systems   Constitutional: Negative for chills, fatigue and fever.   Respiratory: Negative for cough, chest tightness and shortness of breath.    Cardiovascular: Negative for chest pain and palpitations.   Gastrointestinal: Negative for abdominal distention, abdominal pain and blood in stool.   Endocrine: Negative for polydipsia and polyuria.       Objective:     BP (!) 152/83 (BP Location: Left arm, Patient Position: Sitting, BP Method: Large (Automatic))   Pulse 79   Ht 5' 10" (1.778 m)   Wt 98 kg (216 lb)   BMI 30.99 kg/m²     Physical Exam  Constitutional:       Appearance: Normal appearance. He is not ill-appearing.   Cardiovascular:      Rate and Rhythm: Normal rate and regular rhythm.      Heart sounds:     No gallop.   Pulmonary:      Effort: Pulmonary effort is normal.      Breath sounds: Normal breath sounds. No rales.   Abdominal:      General: There is no distension.      Palpations: Abdomen is soft.      Tenderness: There is no abdominal tenderness.   Neurological:      General: No focal deficit present.      Mental Status: He is alert and oriented to person, place, and time.      Cranial Nerves: No cranial nerve deficit.      Coordination: Coordination normal.       hgb a1c 7.6 in 3/2022  Assessment:       1. Uncontrolled type 2 diabetes mellitus with complication, without long-term current use of insulin    2. Essential hypertension    3. Mixed hyperlipidemia        Plan:     orders hgb a1c cmp  Cont meds  Ada diet  Graded exercise  rtc quarterly        "This note will not be shared with the patient."     "

## 2022-06-08 NOTE — PATIENT INSTRUCTIONS
Wily,     We are always striving for excellence. Should you receive a patient experience survey via text message, electronically, or by mail, we would appreciate if you would take a few moments to give us your feedback. These surveys let us know our strengths as well as areas of opportunity for improvement to better serve you.    Thank you for your time,  Rebeca Daniel LPN      Your test results will be communicated to you via : My [facility], Telephone or Letter.   If you have not received your test results in one week, please contact the clinic at [phone].

## 2022-06-27 ENCOUNTER — PATIENT OUTREACH (OUTPATIENT)
Dept: ADMINISTRATIVE | Facility: HOSPITAL | Age: 57
End: 2022-06-27
Payer: COMMERCIAL

## 2022-06-27 NOTE — PROGRESS NOTES
Chart review for overdue colon cancer screening. Reminder sent to portal to have pt return fit kit.

## 2022-06-28 ENCOUNTER — OFFICE VISIT (OUTPATIENT)
Dept: URGENT CARE | Facility: CLINIC | Age: 57
End: 2022-06-28
Payer: COMMERCIAL

## 2022-06-28 VITALS
WEIGHT: 216 LBS | TEMPERATURE: 98 F | HEART RATE: 82 BPM | DIASTOLIC BLOOD PRESSURE: 102 MMHG | HEIGHT: 70 IN | BODY MASS INDEX: 30.92 KG/M2 | RESPIRATION RATE: 18 BRPM | SYSTOLIC BLOOD PRESSURE: 184 MMHG | OXYGEN SATURATION: 99 %

## 2022-06-28 DIAGNOSIS — L25.9 CONTACT DERMATITIS, UNSPECIFIED CONTACT DERMATITIS TYPE, UNSPECIFIED TRIGGER: Primary | ICD-10-CM

## 2022-06-28 PROCEDURE — 3077F SYST BP >= 140 MM HG: CPT | Mod: CPTII,S$GLB,, | Performed by: PHYSICIAN ASSISTANT

## 2022-06-28 PROCEDURE — 4010F PR ACE/ARB THEARPY RXD/TAKEN: ICD-10-PCS | Mod: CPTII,S$GLB,, | Performed by: PHYSICIAN ASSISTANT

## 2022-06-28 PROCEDURE — 3080F DIAST BP >= 90 MM HG: CPT | Mod: CPTII,S$GLB,, | Performed by: PHYSICIAN ASSISTANT

## 2022-06-28 PROCEDURE — 99213 OFFICE O/P EST LOW 20 MIN: CPT | Mod: S$GLB,,, | Performed by: PHYSICIAN ASSISTANT

## 2022-06-28 PROCEDURE — 3008F PR BODY MASS INDEX (BMI) DOCUMENTED: ICD-10-PCS | Mod: CPTII,S$GLB,, | Performed by: PHYSICIAN ASSISTANT

## 2022-06-28 PROCEDURE — 1159F MED LIST DOCD IN RCRD: CPT | Mod: CPTII,S$GLB,, | Performed by: PHYSICIAN ASSISTANT

## 2022-06-28 PROCEDURE — 4010F ACE/ARB THERAPY RXD/TAKEN: CPT | Mod: CPTII,S$GLB,, | Performed by: PHYSICIAN ASSISTANT

## 2022-06-28 PROCEDURE — 99213 PR OFFICE/OUTPT VISIT, EST, LEVL III, 20-29 MIN: ICD-10-PCS | Mod: S$GLB,,, | Performed by: PHYSICIAN ASSISTANT

## 2022-06-28 PROCEDURE — 3080F PR MOST RECENT DIASTOLIC BLOOD PRESSURE >= 90 MM HG: ICD-10-PCS | Mod: CPTII,S$GLB,, | Performed by: PHYSICIAN ASSISTANT

## 2022-06-28 PROCEDURE — 3077F PR MOST RECENT SYSTOLIC BLOOD PRESSURE >= 140 MM HG: ICD-10-PCS | Mod: CPTII,S$GLB,, | Performed by: PHYSICIAN ASSISTANT

## 2022-06-28 PROCEDURE — 3046F HEMOGLOBIN A1C LEVEL >9.0%: CPT | Mod: CPTII,S$GLB,, | Performed by: PHYSICIAN ASSISTANT

## 2022-06-28 PROCEDURE — 1160F RVW MEDS BY RX/DR IN RCRD: CPT | Mod: CPTII,S$GLB,, | Performed by: PHYSICIAN ASSISTANT

## 2022-06-28 PROCEDURE — 3046F PR MOST RECENT HEMOGLOBIN A1C LEVEL > 9.0%: ICD-10-PCS | Mod: CPTII,S$GLB,, | Performed by: PHYSICIAN ASSISTANT

## 2022-06-28 PROCEDURE — 1159F PR MEDICATION LIST DOCUMENTED IN MEDICAL RECORD: ICD-10-PCS | Mod: CPTII,S$GLB,, | Performed by: PHYSICIAN ASSISTANT

## 2022-06-28 PROCEDURE — 1160F PR REVIEW ALL MEDS BY PRESCRIBER/CLIN PHARMACIST DOCUMENTED: ICD-10-PCS | Mod: CPTII,S$GLB,, | Performed by: PHYSICIAN ASSISTANT

## 2022-06-28 PROCEDURE — 3008F BODY MASS INDEX DOCD: CPT | Mod: CPTII,S$GLB,, | Performed by: PHYSICIAN ASSISTANT

## 2022-06-28 RX ORDER — TRIAMCINOLONE ACETONIDE 1 MG/G
CREAM TOPICAL 2 TIMES DAILY
Qty: 45 G | Refills: 0 | Status: SHIPPED | OUTPATIENT
Start: 2022-06-28 | End: 2024-02-28

## 2022-06-28 NOTE — PROGRESS NOTES
"Subjective:       Patient ID: Wily Rodrigues is a 57 y.o. male.    Vitals:  height is 5' 10" (1.778 m) and weight is 98 kg (216 lb). His temperature is 98 °F (36.7 °C). His blood pressure is 184/102 (abnormal) and his pulse is 82. His respiration is 18 and oxygen saturation is 99%.     Chief Complaint: Rash    Mr. Rodrigues presents for evaluation of rash to the left calf.  He reports it started a couple of days ago.  He reports the rash is itchy.  He denies any pain, paresthesias, fevers, chills.  He does report that he just had his pants dry cleaned with a new detergent.  He has tried a non itch spray for symptoms with some relief.    Rash  This is a new problem. The current episode started in the past 7 days. The problem is unchanged. The affected locations include the left lower leg. The rash is characterized by itchiness. It is unknown if there was an exposure to a precipitant. Pertinent negatives include no anorexia, congestion, cough, diarrhea, eye pain, facial edema, fatigue, fever, joint pain, nail changes, rhinorrhea, shortness of breath, sore throat or vomiting. Treatments tried: non-itch spray. There is no history of allergies, asthma, eczema or varicella.       Constitution: Negative for appetite change, chills, sweating, fatigue and fever.   HENT: Negative for ear pain, ear discharge, congestion, postnasal drip, sinus pain, sinus pressure and sore throat.    Neck: Negative for neck pain and neck stiffness.   Cardiovascular: Negative for chest trauma, chest pain, leg swelling, palpitations, sob on exertion and passing out.   Eyes: Negative for eye pain and blurred vision.   Respiratory: Negative for cough and shortness of breath.    Gastrointestinal: Negative for abdominal pain, nausea, vomiting and diarrhea.   Genitourinary: Negative for dysuria, frequency and urgency.   Musculoskeletal: Negative for pain.   Skin: Positive for rash. Negative for erythema.   Neurological: Negative for dizziness, " history of vertigo, light-headedness, passing out, facial drooping, speech difficulty, coordination disturbances, loss of balance and headaches.   Hematologic/Lymphatic: Negative for easy bruising/bleeding. Does not bruise/bleed easily.   Psychiatric/Behavioral: Negative for confusion.       Objective:      Physical Exam   Constitutional: He is oriented to person, place, and time. He appears well-developed.   HENT:   Head: Normocephalic and atraumatic. Head is without abrasion, without contusion and without laceration.   Ears:   Right Ear: External ear normal.   Left Ear: External ear normal.   Nose: Nose normal.   Mouth/Throat: Oropharynx is clear and moist and mucous membranes are normal.   Eyes: Conjunctivae, EOM and lids are normal. Pupils are equal, round, and reactive to light.   Neck: Trachea normal and phonation normal. Neck supple.   Cardiovascular: Normal rate, regular rhythm and normal heart sounds.   Pulmonary/Chest: Effort normal and breath sounds normal. No stridor. No respiratory distress.   Musculoskeletal: Normal range of motion.         General: Normal range of motion.   Neurological: He is alert and oriented to person, place, and time.   Skin: Skin is warm, dry, intact and no rash. Capillary refill takes less than 2 seconds. No abrasion, No burn, No bruising, No erythema and No ecchymosis        Psychiatric: His speech is normal and behavior is normal. Judgment and thought content normal.   Nursing note and vitals reviewed.        Assessment:       1. Contact dermatitis, unspecified contact dermatitis type, unspecified trigger          Plan:         Contact dermatitis, unspecified contact dermatitis type, unspecified trigger    Other orders  -     triamcinolone acetonide 0.1% (KENALOG) 0.1 % cream; Apply topically 2 (two) times daily. for 10 days  Dispense: 45 g; Refill: 0    Diagnoses and plan discussed with the patient, as well as the expected course and duration of his symptoms.  All questions  and concerns were addressed prior to discharge.  He was advised to follow up with his PCP within 1 week if symptoms do not improve.  Emergency department precautions were given.  Patient verbalized understanding and was happy with the plan of care.   Note dictated with voice recognition software, please excuse any grammatical errors.    Patient Instructions   PLEASE READ YOUR DISCHARGE INSTRUCTIONS ENTIRELY AS IT CONTAINS IMPORTANT INFORMATION.  - Rest.    - Drink plenty of fluids.    - Tylenol or Ibuprofen as directed as needed for fever/pain.    - If you were prescribed antibiotics, please take them to completion.  - If you are female and on birth control pills - please use additional methods of contraception to prevent pregnancy while on antibiotics and for one cycle after.   - If you were prescribed a narcotic medication, a cough syrup, or a muscle relaxer, do not drive or operate heavy equipment or machinery while taking these medications, as they can cause drowsiness.   - If a referral to a specialty was made today, you should receive a phone call in the next few days to schedule an appt.  Please call [phone] to schedule an appt if have not gotten a phone call in the next few days.  - If you smoke, please stop smoking.  -You must understand that you've received an Urgent Care treatment only and that you may be released before all your medical problems are known or treated. You, the patient, will arrange for follow up care as instructed. Please arrange follow up with your primary medical clinic as soon as possible.   - Follow up with your PCP or specialty clinic as directed in the next 1-2 weeks if not improved or as needed.  You can call [phone] to schedule an appointment with the appropriate provider.    - Please return to Urgent Care or to the Emergency Department if your symptoms worsen.    Patient aware and verbalized understanding.

## 2022-06-28 NOTE — PATIENT INSTRUCTIONS
PLEASE READ YOUR DISCHARGE INSTRUCTIONS ENTIRELY AS IT CONTAINS IMPORTANT INFORMATION.  - Rest.    - Drink plenty of fluids.    - Tylenol or Ibuprofen as directed as needed for fever/pain.    - If you were prescribed antibiotics, please take them to completion.  - If you are female and on birth control pills - please use additional methods of contraception to prevent pregnancy while on antibiotics and for one cycle after.   - If you were prescribed a narcotic medication, a cough syrup, or a muscle relaxer, do not drive or operate heavy equipment or machinery while taking these medications, as they can cause drowsiness.   - If a referral to a specialty was made today, you should receive a phone call in the next few days to schedule an appt.  Please call [phone] to schedule an appt if have not gotten a phone call in the next few days.  - If you smoke, please stop smoking.  -You must understand that you've received an Urgent Care treatment only and that you may be released before all your medical problems are known or treated. You, the patient, will arrange for follow up care as instructed. Please arrange follow up with your primary medical clinic as soon as possible.   - Follow up with your PCP or specialty clinic as directed in the next 1-2 weeks if not improved or as needed.  You can call [phone] to schedule an appointment with the appropriate provider.    - Please return to Urgent Care or to the Emergency Department if your symptoms worsen.    Patient aware and verbalized understanding.

## 2022-07-08 ENCOUNTER — TELEPHONE (OUTPATIENT)
Dept: FAMILY MEDICINE | Facility: CLINIC | Age: 57
End: 2022-07-08
Payer: COMMERCIAL

## 2022-07-08 RX ORDER — ATORVASTATIN CALCIUM 20 MG/1
TABLET, FILM COATED ORAL
Qty: 90 TABLET | Refills: 3 | Status: SHIPPED | OUTPATIENT
Start: 2022-07-08 | End: 2023-07-18

## 2022-07-08 RX ORDER — GLIMEPIRIDE 2 MG/1
2 TABLET ORAL
Qty: 90 TABLET | Refills: 3 | OUTPATIENT
Start: 2022-07-08 | End: 2023-07-08

## 2022-07-08 NOTE — TELEPHONE ENCOUNTER
No new care gaps identified.  [facility] Embedded Care Gaps. Reference number: [id number]. 7/08/2022   7:38:41 AM TRUDIT

## 2022-07-08 NOTE — TELEPHONE ENCOUNTER
Refill Decision Note   Wily Lilia  is requesting a refill authorization.  Brief Assessment and Rationale for Refill:  Quick Discontinue  Approve     Medication Therapy Plan:  QDC amaryl - dose increased 12/01/21. Approve lipitor.    Medication Reconciliation Completed: No   Comments:     No Care Gaps recommended.     Note composed:1:40 PM 07/08/2022

## 2022-07-08 NOTE — TELEPHONE ENCOUNTER
----- Message from Areli Kramer MD sent at 7/8/2022  9:46 AM CDT -----  Please help pt make an appt to go over his labs audio virtual is fine

## 2022-07-11 ENCOUNTER — TELEPHONE (OUTPATIENT)
Dept: FAMILY MEDICINE | Facility: CLINIC | Age: 57
End: 2022-07-11
Payer: COMMERCIAL

## 2022-07-18 ENCOUNTER — PATIENT MESSAGE (OUTPATIENT)
Dept: FAMILY MEDICINE | Facility: CLINIC | Age: 57
End: 2022-07-18
Payer: COMMERCIAL

## 2022-08-02 ENCOUNTER — PATIENT MESSAGE (OUTPATIENT)
Dept: ADMINISTRATIVE | Facility: HOSPITAL | Age: 57
End: 2022-08-02
Payer: COMMERCIAL

## 2022-08-02 ENCOUNTER — PATIENT OUTREACH (OUTPATIENT)
Dept: ADMINISTRATIVE | Facility: HOSPITAL | Age: 57
End: 2022-08-02
Payer: COMMERCIAL

## 2022-08-14 NOTE — TELEPHONE ENCOUNTER
No new care gaps identified.  [facility] Embedded Care Gaps. Reference number: [id number]. 8/14/2022   1:36:57 PM CDT

## 2022-08-16 RX ORDER — GLIMEPIRIDE 2 MG/1
2 TABLET ORAL
Qty: 90 TABLET | Refills: 3 | OUTPATIENT
Start: 2022-08-16 | End: 2023-08-16

## 2022-08-16 NOTE — TELEPHONE ENCOUNTER
Refill Decision Note   Wily Lilia  is requesting a refill authorization.  Brief Assessment and Rationale for Refill:  Quick Discontinue     Medication Therapy Plan:  Patient on glimepiride 4 mg since 9/10/21. Refusing request for 2 mg dose.    Medication Reconciliation Completed: No   Comments:     No Care Gaps recommended.     Note composed:3:00 PM 08/16/2022

## 2022-08-26 ENCOUNTER — PATIENT MESSAGE (OUTPATIENT)
Dept: INTERNAL MEDICINE | Facility: CLINIC | Age: 57
End: 2022-08-26
Payer: COMMERCIAL

## 2022-08-26 ENCOUNTER — PATIENT OUTREACH (OUTPATIENT)
Dept: INTERNAL MEDICINE | Facility: CLINIC | Age: 57
End: 2022-08-26
Payer: COMMERCIAL

## 2022-08-26 DIAGNOSIS — E11.9 TYPE 2 DIABETES MELLITUS WITHOUT COMPLICATION, WITHOUT LONG-TERM CURRENT USE OF INSULIN: Primary | ICD-10-CM

## 2022-08-30 ENCOUNTER — PATIENT MESSAGE (OUTPATIENT)
Dept: INTERNAL MEDICINE | Facility: CLINIC | Age: 57
End: 2022-08-30
Payer: COMMERCIAL

## 2022-09-08 ENCOUNTER — OFFICE VISIT (OUTPATIENT)
Dept: FAMILY MEDICINE | Facility: CLINIC | Age: 57
End: 2022-09-08
Attending: FAMILY MEDICINE
Payer: COMMERCIAL

## 2022-09-08 ENCOUNTER — LAB VISIT (OUTPATIENT)
Dept: LAB | Facility: HOSPITAL | Age: 57
End: 2022-09-08
Attending: FAMILY MEDICINE
Payer: COMMERCIAL

## 2022-09-08 ENCOUNTER — DOCUMENTATION ONLY (OUTPATIENT)
Dept: FAMILY MEDICINE | Facility: CLINIC | Age: 57
End: 2022-09-08
Payer: COMMERCIAL

## 2022-09-08 VITALS
BODY MASS INDEX: 31.95 KG/M2 | HEIGHT: 70 IN | HEART RATE: 62 BPM | SYSTOLIC BLOOD PRESSURE: 132 MMHG | DIASTOLIC BLOOD PRESSURE: 75 MMHG | RESPIRATION RATE: 16 BRPM | WEIGHT: 223.19 LBS

## 2022-09-08 DIAGNOSIS — Z12.11 SCREEN FOR COLON CANCER: ICD-10-CM

## 2022-09-08 DIAGNOSIS — E78.2 MIXED HYPERLIPIDEMIA: ICD-10-CM

## 2022-09-08 DIAGNOSIS — E03.9 ACQUIRED HYPOTHYROIDISM: ICD-10-CM

## 2022-09-08 DIAGNOSIS — Z12.5 SCREENING FOR PROSTATE CANCER: ICD-10-CM

## 2022-09-08 DIAGNOSIS — I10 ESSENTIAL HYPERTENSION: ICD-10-CM

## 2022-09-08 DIAGNOSIS — Z00.00 ANNUAL PHYSICAL EXAM: Primary | ICD-10-CM

## 2022-09-08 LAB
ALBUMIN SERPL BCP-MCNC: 4.1 G/DL (ref 3.5–5.2)
ALBUMIN/CREAT UR: 78.8 UG/MG (ref 0–30)
ALP SERPL-CCNC: 94 U/L (ref 55–135)
ALT SERPL W/O P-5'-P-CCNC: 49 U/L (ref 10–44)
ANION GAP SERPL CALC-SCNC: 10 MMOL/L (ref 8–16)
AST SERPL-CCNC: 32 U/L (ref 10–40)
BASOPHILS # BLD AUTO: 0.04 K/UL (ref 0–0.2)
BASOPHILS NFR BLD: 0.6 % (ref 0–1.9)
BILIRUB SERPL-MCNC: 0.4 MG/DL (ref 0.1–1)
BUN SERPL-MCNC: 11 MG/DL (ref 6–20)
CALCIUM SERPL-MCNC: 9 MG/DL (ref 8.7–10.5)
CHLORIDE SERPL-SCNC: 97 MMOL/L (ref 95–110)
CO2 SERPL-SCNC: 24 MMOL/L (ref 23–29)
COMPLEXED PSA SERPL-MCNC: 2.1 NG/ML (ref 0–4)
CREAT SERPL-MCNC: 0.9 MG/DL (ref 0.5–1.4)
CREAT UR-MCNC: 146 MG/DL (ref 23–375)
DIFFERENTIAL METHOD: ABNORMAL
EOSINOPHIL # BLD AUTO: 0.1 K/UL (ref 0–0.5)
EOSINOPHIL NFR BLD: 1.3 % (ref 0–8)
ERYTHROCYTE [DISTWIDTH] IN BLOOD BY AUTOMATED COUNT: 12.3 % (ref 11.5–14.5)
EST. GFR  (NO RACE VARIABLE): >60 ML/MIN/1.73 M^2
ESTIMATED AVG GLUCOSE: 246 MG/DL (ref 68–131)
GLUCOSE SERPL-MCNC: 274 MG/DL (ref 70–110)
GLUCOSE SERPL-MCNC: 288 MG/DL (ref 70–110)
HBA1C MFR BLD: 10.2 % (ref 4–5.6)
HCT VFR BLD AUTO: 43.6 % (ref 40–54)
HGB BLD-MCNC: 15 G/DL (ref 14–18)
IMM GRANULOCYTES # BLD AUTO: 0.06 K/UL (ref 0–0.04)
IMM GRANULOCYTES NFR BLD AUTO: 0.9 % (ref 0–0.5)
LYMPHOCYTES # BLD AUTO: 2.1 K/UL (ref 1–4.8)
LYMPHOCYTES NFR BLD: 31.9 % (ref 18–48)
MCH RBC QN AUTO: 31.7 PG (ref 27–31)
MCHC RBC AUTO-ENTMCNC: 34.4 G/DL (ref 32–36)
MCV RBC AUTO: 92 FL (ref 82–98)
MICROALBUMIN UR DL<=1MG/L-MCNC: 115 UG/ML
MONOCYTES # BLD AUTO: 0.5 K/UL (ref 0.3–1)
MONOCYTES NFR BLD: 6.7 % (ref 4–15)
NEUTROPHILS # BLD AUTO: 3.9 K/UL (ref 1.8–7.7)
NEUTROPHILS NFR BLD: 58.6 % (ref 38–73)
NRBC BLD-RTO: 0 /100 WBC
PLATELET # BLD AUTO: 244 K/UL (ref 150–450)
PMV BLD AUTO: 11.1 FL (ref 9.2–12.9)
POTASSIUM SERPL-SCNC: 4.1 MMOL/L (ref 3.5–5.1)
PROT SERPL-MCNC: 7 G/DL (ref 6–8.4)
RBC # BLD AUTO: 4.73 M/UL (ref 4.6–6.2)
SODIUM SERPL-SCNC: 131 MMOL/L (ref 136–145)
TSH SERPL DL<=0.005 MIU/L-ACNC: 2.51 UIU/ML (ref 0.4–4)
WBC # BLD AUTO: 6.7 K/UL (ref 3.9–12.7)

## 2022-09-08 PROCEDURE — 3046F HEMOGLOBIN A1C LEVEL >9.0%: CPT | Mod: CPTII,S$GLB,, | Performed by: FAMILY MEDICINE

## 2022-09-08 PROCEDURE — 82043 UR ALBUMIN QUANTITATIVE: CPT | Performed by: FAMILY MEDICINE

## 2022-09-08 PROCEDURE — 99396 PREV VISIT EST AGE 40-64: CPT | Mod: S$GLB,,, | Performed by: FAMILY MEDICINE

## 2022-09-08 PROCEDURE — 3060F PR POS MICROALBUMINURIA RESULT DOCUMENTED/REVIEW: ICD-10-PCS | Mod: CPTII,S$GLB,, | Performed by: FAMILY MEDICINE

## 2022-09-08 PROCEDURE — 3078F PR MOST RECENT DIASTOLIC BLOOD PRESSURE < 80 MM HG: ICD-10-PCS | Mod: CPTII,S$GLB,, | Performed by: FAMILY MEDICINE

## 2022-09-08 PROCEDURE — 3060F POS MICROALBUMINURIA REV: CPT | Mod: CPTII,S$GLB,, | Performed by: FAMILY MEDICINE

## 2022-09-08 PROCEDURE — 36415 COLL VENOUS BLD VENIPUNCTURE: CPT | Mod: PO | Performed by: FAMILY MEDICINE

## 2022-09-08 PROCEDURE — 83036 HEMOGLOBIN GLYCOSYLATED A1C: CPT | Performed by: FAMILY MEDICINE

## 2022-09-08 PROCEDURE — 1159F MED LIST DOCD IN RCRD: CPT | Mod: CPTII,S$GLB,, | Performed by: FAMILY MEDICINE

## 2022-09-08 PROCEDURE — 82962 POCT GLUCOSE, HAND-HELD DEVICE: ICD-10-PCS | Mod: S$GLB,,, | Performed by: FAMILY MEDICINE

## 2022-09-08 PROCEDURE — 3078F DIAST BP <80 MM HG: CPT | Mod: CPTII,S$GLB,, | Performed by: FAMILY MEDICINE

## 2022-09-08 PROCEDURE — 4010F ACE/ARB THERAPY RXD/TAKEN: CPT | Mod: CPTII,S$GLB,, | Performed by: FAMILY MEDICINE

## 2022-09-08 PROCEDURE — 84153 ASSAY OF PSA TOTAL: CPT | Performed by: FAMILY MEDICINE

## 2022-09-08 PROCEDURE — 3066F PR DOCUMENTATION OF TREATMENT FOR NEPHROPATHY: ICD-10-PCS | Mod: CPTII,S$GLB,, | Performed by: FAMILY MEDICINE

## 2022-09-08 PROCEDURE — 4010F PR ACE/ARB THEARPY RXD/TAKEN: ICD-10-PCS | Mod: CPTII,S$GLB,, | Performed by: FAMILY MEDICINE

## 2022-09-08 PROCEDURE — 82570 ASSAY OF URINE CREATININE: CPT | Performed by: FAMILY MEDICINE

## 2022-09-08 PROCEDURE — 80053 COMPREHEN METABOLIC PANEL: CPT | Performed by: FAMILY MEDICINE

## 2022-09-08 PROCEDURE — 3008F PR BODY MASS INDEX (BMI) DOCUMENTED: ICD-10-PCS | Mod: CPTII,S$GLB,, | Performed by: FAMILY MEDICINE

## 2022-09-08 PROCEDURE — 3066F NEPHROPATHY DOC TX: CPT | Mod: CPTII,S$GLB,, | Performed by: FAMILY MEDICINE

## 2022-09-08 PROCEDURE — 82962 GLUCOSE BLOOD TEST: CPT | Mod: S$GLB,,, | Performed by: FAMILY MEDICINE

## 2022-09-08 PROCEDURE — 3008F BODY MASS INDEX DOCD: CPT | Mod: CPTII,S$GLB,, | Performed by: FAMILY MEDICINE

## 2022-09-08 PROCEDURE — 99999 PR PBB SHADOW E&M-EST. PATIENT-LVL V: ICD-10-PCS | Mod: PBBFAC,,, | Performed by: FAMILY MEDICINE

## 2022-09-08 PROCEDURE — 3046F PR MOST RECENT HEMOGLOBIN A1C LEVEL > 9.0%: ICD-10-PCS | Mod: CPTII,S$GLB,, | Performed by: FAMILY MEDICINE

## 2022-09-08 PROCEDURE — 99396 PR PREVENTIVE VISIT,EST,40-64: ICD-10-PCS | Mod: S$GLB,,, | Performed by: FAMILY MEDICINE

## 2022-09-08 PROCEDURE — 1159F PR MEDICATION LIST DOCUMENTED IN MEDICAL RECORD: ICD-10-PCS | Mod: CPTII,S$GLB,, | Performed by: FAMILY MEDICINE

## 2022-09-08 PROCEDURE — 84443 ASSAY THYROID STIM HORMONE: CPT | Performed by: FAMILY MEDICINE

## 2022-09-08 PROCEDURE — 85025 COMPLETE CBC W/AUTO DIFF WBC: CPT | Performed by: FAMILY MEDICINE

## 2022-09-08 PROCEDURE — 3075F SYST BP GE 130 - 139MM HG: CPT | Mod: CPTII,S$GLB,, | Performed by: FAMILY MEDICINE

## 2022-09-08 PROCEDURE — 99999 PR PBB SHADOW E&M-EST. PATIENT-LVL V: CPT | Mod: PBBFAC,,, | Performed by: FAMILY MEDICINE

## 2022-09-08 PROCEDURE — 3075F PR MOST RECENT SYSTOLIC BLOOD PRESS GE 130-139MM HG: ICD-10-PCS | Mod: CPTII,S$GLB,, | Performed by: FAMILY MEDICINE

## 2022-09-08 RX ORDER — METFORMIN HYDROCHLORIDE 500 MG/1
1500 TABLET, EXTENDED RELEASE ORAL DAILY
Qty: 270 TABLET | Refills: 3 | Status: SHIPPED | OUTPATIENT
Start: 2022-09-08 | End: 2022-12-08 | Stop reason: SDUPTHER

## 2022-09-08 RX ORDER — GLIMEPIRIDE 4 MG/1
4 TABLET ORAL
Qty: 90 TABLET | Refills: 3 | Status: SHIPPED | OUTPATIENT
Start: 2022-09-08 | End: 2023-09-28

## 2022-09-08 RX ORDER — PIOGLITAZONEHYDROCHLORIDE 30 MG/1
30 TABLET ORAL DAILY
Qty: 90 TABLET | Refills: 3 | Status: SHIPPED | OUTPATIENT
Start: 2022-09-08 | End: 2023-07-18

## 2022-09-08 NOTE — PROGRESS NOTES
"Subjective:       Patient ID: Wily Rodrigues is a 57 y.o. male.    Chief Complaint: Diabetes and Annual Exam    HPI  Pt is here for annual exam pt is well no sob/cp no change in bowel habits no brbpr  Pt denies n/v/f/c/d/c no cough chest congestion no sore throat   Pt denies dysuria hematuria no acute urinary complaints  Pt has dm he is on sulfon and metformin he takes 3 metformin daily at times he does not know his fbs  Pt has htn bp fine today on ace no cough     Review of Systems   Constitutional:  Negative for activity change, chills, fatigue and fever.   HENT:  Negative for congestion, ear pain, hearing loss, postnasal drip, rhinorrhea, sinus pressure and sore throat.    Eyes:  Negative for photophobia, pain, redness and visual disturbance.   Respiratory:  Negative for cough, chest tightness and shortness of breath.    Cardiovascular:  Negative for chest pain, palpitations and leg swelling.   Gastrointestinal:  Negative for abdominal pain, blood in stool, constipation, diarrhea, nausea and vomiting.   Endocrine: Negative for polydipsia and polyuria.   Genitourinary:  Negative for decreased urine volume, difficulty urinating, dysuria, frequency, penile discharge and urgency.   Musculoskeletal:  Negative for back pain, joint swelling and neck pain.   Skin:  Negative for color change, pallor and rash.   Neurological:  Negative for dizziness, seizures, speech difficulty and numbness.   Hematological:  Does not bruise/bleed easily.   Psychiatric/Behavioral:  Negative for behavioral problems, confusion, decreased concentration and suicidal ideas.      Objective:    /75   Pulse 62   Resp 16   Ht 5' 10" (1.778 m)   Wt 101.2 kg (223 lb 3.2 oz)   BMI 32.03 kg/m²     Physical Exam  Constitutional:       General: He is not in acute distress.     Appearance: He is well-developed. He is obese.   HENT:      Head: Normocephalic and atraumatic.      Nose: Nose normal.   Eyes:      Pupils: Pupils are equal, round, " "and reactive to light.   Neck:      Thyroid: No thyromegaly.   Cardiovascular:      Rate and Rhythm: Normal rate and regular rhythm.      Heart sounds: Normal heart sounds. No murmur heard.    No friction rub. No gallop.   Pulmonary:      Effort: Pulmonary effort is normal. No respiratory distress.      Breath sounds: Normal breath sounds.   Abdominal:      General: Bowel sounds are normal. There is no distension.      Palpations: Abdomen is soft.      Tenderness: There is no abdominal tenderness. There is no guarding or rebound.   Genitourinary:     Comments: declined  Musculoskeletal:         General: No tenderness. Normal range of motion.      Cervical back: Normal range of motion and neck supple.   Skin:     General: Skin is warm and dry.      Findings: No erythema.   Neurological:      Mental Status: He is alert and oriented to person, place, and time.      Cranial Nerves: No cranial nerve deficit.      Coordination: Coordination normal.   Psychiatric:         Behavior: Behavior normal.         Thought Content: Thought content normal.         Judgment: Judgment normal.       Assessment:       1. Annual physical exam    2. Uncontrolled type 2 diabetes mellitus with complication, without long-term current use of insulin    3. Screen for colon cancer    4. Screening for prostate cancer    5. Acquired hypothyroidism    6. Mixed hyperlipidemia    7. Essential hypertension          Plan:        Orders cmp cbc lipid tsh urine hgb a1c   Cont meds  Ada weight loss diet  Graded exercise  Rtc quarterly    Health maintenance  Lipid ordered  Flu in fall  Tetanus q 10 years  Colonoscopy discussed  Psa discussed  Covid discussed    "This note will not be shared with the patient."       "

## 2022-09-08 NOTE — PATIENT INSTRUCTIONS
Wily,     We are always striving for excellence. Should you receive a patient experience survey via text message, electronically, or by mail, we would appreciate if you would take a few moments to give us your feedback. These surveys let us know our strengths as well as areas of opportunity for improvement to better serve you.    Thank you for your time,  Rebeca Daniel LPN      Your test results will be communicated to you via : My [facility], Telephone or Letter.   If you have not received your test results in one week, please contact the clinic at [phone].

## 2022-09-29 ENCOUNTER — PATIENT MESSAGE (OUTPATIENT)
Dept: FAMILY MEDICINE | Facility: CLINIC | Age: 57
End: 2022-09-29
Payer: COMMERCIAL

## 2022-09-29 ENCOUNTER — TELEPHONE (OUTPATIENT)
Dept: FAMILY MEDICINE | Facility: CLINIC | Age: 57
End: 2022-09-29
Payer: COMMERCIAL

## 2022-09-29 NOTE — TELEPHONE ENCOUNTER
----- Message from Areli Kramer MD sent at 9/29/2022  4:18 AM CDT -----  Regarding: lab follow up  Please help pt make a virtual lab follow up visit to go over his results please remind pt to have his medications and a fbs log available

## 2022-10-03 ENCOUNTER — PATIENT MESSAGE (OUTPATIENT)
Dept: ADMINISTRATIVE | Facility: HOSPITAL | Age: 57
End: 2022-10-03
Payer: COMMERCIAL

## 2022-11-05 ENCOUNTER — OFFICE VISIT (OUTPATIENT)
Dept: URGENT CARE | Facility: CLINIC | Age: 57
End: 2022-11-05
Payer: COMMERCIAL

## 2022-11-05 VITALS
TEMPERATURE: 98 F | OXYGEN SATURATION: 99 % | RESPIRATION RATE: 18 BRPM | WEIGHT: 223 LBS | SYSTOLIC BLOOD PRESSURE: 146 MMHG | DIASTOLIC BLOOD PRESSURE: 74 MMHG | BODY MASS INDEX: 31.92 KG/M2 | HEART RATE: 77 BPM | HEIGHT: 70 IN

## 2022-11-05 DIAGNOSIS — R05.9 COUGH, UNSPECIFIED TYPE: Primary | ICD-10-CM

## 2022-11-05 DIAGNOSIS — J20.9 ACUTE BRONCHITIS, UNSPECIFIED ORGANISM: ICD-10-CM

## 2022-11-05 DIAGNOSIS — E11.9 TYPE 2 DIABETES MELLITUS WITHOUT COMPLICATION, WITHOUT LONG-TERM CURRENT USE OF INSULIN: ICD-10-CM

## 2022-11-05 LAB
CTP QC/QA: YES
CTP QC/QA: YES
POC MOLECULAR INFLUENZA A AGN: NEGATIVE
POC MOLECULAR INFLUENZA B AGN: NEGATIVE
SARS-COV-2 AG RESP QL IA.RAPID: NEGATIVE

## 2022-11-05 PROCEDURE — 3077F SYST BP >= 140 MM HG: CPT | Mod: CPTII,S$GLB,,

## 2022-11-05 PROCEDURE — 3046F PR MOST RECENT HEMOGLOBIN A1C LEVEL > 9.0%: ICD-10-PCS | Mod: CPTII,S$GLB,,

## 2022-11-05 PROCEDURE — 3066F NEPHROPATHY DOC TX: CPT | Mod: CPTII,S$GLB,,

## 2022-11-05 PROCEDURE — 1159F MED LIST DOCD IN RCRD: CPT | Mod: CPTII,S$GLB,,

## 2022-11-05 PROCEDURE — 1160F RVW MEDS BY RX/DR IN RCRD: CPT | Mod: CPTII,S$GLB,,

## 2022-11-05 PROCEDURE — 3060F POS MICROALBUMINURIA REV: CPT | Mod: CPTII,S$GLB,,

## 2022-11-05 PROCEDURE — 3078F PR MOST RECENT DIASTOLIC BLOOD PRESSURE < 80 MM HG: ICD-10-PCS | Mod: CPTII,S$GLB,,

## 2022-11-05 PROCEDURE — 1160F PR REVIEW ALL MEDS BY PRESCRIBER/CLIN PHARMACIST DOCUMENTED: ICD-10-PCS | Mod: CPTII,S$GLB,,

## 2022-11-05 PROCEDURE — 4010F ACE/ARB THERAPY RXD/TAKEN: CPT | Mod: CPTII,S$GLB,,

## 2022-11-05 PROCEDURE — 3060F PR POS MICROALBUMINURIA RESULT DOCUMENTED/REVIEW: ICD-10-PCS | Mod: CPTII,S$GLB,,

## 2022-11-05 PROCEDURE — 3078F DIAST BP <80 MM HG: CPT | Mod: CPTII,S$GLB,,

## 2022-11-05 PROCEDURE — 87811 SARS-COV-2 COVID19 W/OPTIC: CPT | Mod: QW,S$GLB,,

## 2022-11-05 PROCEDURE — 99213 OFFICE O/P EST LOW 20 MIN: CPT | Mod: S$GLB,,,

## 2022-11-05 PROCEDURE — 99213 PR OFFICE/OUTPT VISIT, EST, LEVL III, 20-29 MIN: ICD-10-PCS | Mod: S$GLB,,,

## 2022-11-05 PROCEDURE — 3046F HEMOGLOBIN A1C LEVEL >9.0%: CPT | Mod: CPTII,S$GLB,,

## 2022-11-05 PROCEDURE — 87502 POCT INFLUENZA A/B MOLECULAR: ICD-10-PCS | Mod: QW,S$GLB,,

## 2022-11-05 PROCEDURE — 3066F PR DOCUMENTATION OF TREATMENT FOR NEPHROPATHY: ICD-10-PCS | Mod: CPTII,S$GLB,,

## 2022-11-05 PROCEDURE — 1159F PR MEDICATION LIST DOCUMENTED IN MEDICAL RECORD: ICD-10-PCS | Mod: CPTII,S$GLB,,

## 2022-11-05 PROCEDURE — 87502 INFLUENZA DNA AMP PROBE: CPT | Mod: QW,S$GLB,,

## 2022-11-05 PROCEDURE — 3008F PR BODY MASS INDEX (BMI) DOCUMENTED: ICD-10-PCS | Mod: CPTII,S$GLB,,

## 2022-11-05 PROCEDURE — 3077F PR MOST RECENT SYSTOLIC BLOOD PRESSURE >= 140 MM HG: ICD-10-PCS | Mod: CPTII,S$GLB,,

## 2022-11-05 PROCEDURE — 87811 SARS CORONAVIRUS 2 ANTIGEN POCT, MANUAL READ: ICD-10-PCS | Mod: QW,S$GLB,,

## 2022-11-05 PROCEDURE — 3008F BODY MASS INDEX DOCD: CPT | Mod: CPTII,S$GLB,,

## 2022-11-05 PROCEDURE — 4010F PR ACE/ARB THEARPY RXD/TAKEN: ICD-10-PCS | Mod: CPTII,S$GLB,,

## 2022-11-05 RX ORDER — BENZONATATE 200 MG/1
200 CAPSULE ORAL 3 TIMES DAILY PRN
Qty: 30 CAPSULE | Refills: 0 | Status: SHIPPED | OUTPATIENT
Start: 2022-11-05 | End: 2022-11-15

## 2022-11-05 RX ORDER — CODEINE PHOSPHATE AND GUAIFENESIN 10; 100 MG/5ML; MG/5ML
5 SOLUTION ORAL EVERY 6 HOURS PRN
Qty: 118 ML | Refills: 0 | Status: SHIPPED | OUTPATIENT
Start: 2022-11-05 | End: 2022-11-15

## 2022-11-05 NOTE — PATIENT INSTRUCTIONS
Today you have been given a narcotic. DO NOT operate heavy machinery while on this medication. Do not take other narcotics or drink alcohol while on this medication. Do not take more than the recommended dose. If you start having trouble breathing or develop an allergic reaction, go to the emergency room immediately.   - Rest.    - Drink plenty of fluids.    - Acetaminophen (tylenol) or Ibuprofen (advil,motrin) as directed as needed for fever/pain. Avoid tylenol if you have a history of liver disease. Do not take ibuprofen if you have a history of GI bleeding, kidney disease, or if you take blood thinners.   - Ibuprofen dosing for adults: 400 mg by mouth every 4-6 hours as needed. Max: 2400 mg/day; Info: use lowest effective dose, shortest effective treatment duration; give w/ food if GI upset occurs.  - Ibuprofen dosing for children: [6 mo-10 yo] Dose: 5-10 mg/kg/dose by mouth every 6-8h as needed; Max: 40 mg/kg/day; Info: use lower dose for fever <102.5 F, higher dose for fever >102.5 F; use shortest effective tx duration; give w/ food if GI upset occurs. [13 yo and older] Dose: 200-400 mg by mouth every 4-6 hours as needed; Max: 1200 mg/day; Info: use lowest effective dose, shortest effective tx duration; give w/ food if GI upset occurs.  - Tylenol dosing for adults: [By mouth route, immediate-release form] Dose: 325-1000 mg by mouth every 4-6h as needed; Max: 1 g/4h and 4 g/day from all sources. [By mouth route, extended-release form] Dose: 650-1300 mg Extended Release by mouth every 8h as needed; Max: 4 g/day from all sources.   - Tylenol dosing for children: 6-10 yo [ oral tablet/capsule ] Dose: 1 tab/cap by mouth every 4-6h as needed; Max: 5 tabs or caps/24h; Info: do not exceed 75 mg/kg/day, up to 1 g/4h and 4 g/day, from all sources. 13 yo and older [ oral tablet/capsule ] Dose: 1-2 tabs/caps by mouth every 4-6h as needed; Max: 10 tabs or caps/24h; Info: do not exceed 1 g/4h and 4 g/day from all  sources.    - You must understand that you have received an Urgent Care treatment only and that you may be released before all of your medical problems are known or treated.   - You, the patient, will arrange for follow up care as instructed.   - If your condition worsens or fails to improve we recommend that you receive another evaluation at the ER immediately or contact your PCP to discuss your concerns or return here.   - Follow up with your PCP or specialty clinic as directed in the next 1-2 weeks if not improved or as needed.  You can call [phone] to schedule an appointment with the appropriate provider.  If your symptoms do not improve or worsen, go to the emergency room immediately.

## 2022-11-05 NOTE — PROGRESS NOTES
"Subjective:       Patient ID: Wily Rodrigues is a 57 y.o. male.    Vitals:  height is 5' 10" (1.778 m) and weight is 101.2 kg (223 lb). His temperature is 97.8 °F (36.6 °C). His blood pressure is 146/74 (abnormal) and his pulse is 77. His respiration is 18 and oxygen saturation is 99%.     Chief Complaint: Cough    Patient presents today for a week of coughing.  He states that it started off with sinus congestion but that has since resolved.  He is having a dry cough.  He denies any fevers, body aches or chills.    Cough  This is a new problem. The current episode started in the past 7 days. The problem has been unchanged. The problem occurs constantly. The cough is Productive of sputum. Associated symptoms include chills, headaches, nasal congestion, postnasal drip and a sore throat. Pertinent negatives include no chest pain, ear congestion, ear pain, fever, heartburn, hemoptysis, myalgias, rash, rhinorrhea, shortness of breath, sweats, weight loss or wheezing. He has tried OTC cough suppressant (mucinex) for the symptoms. The treatment provided mild relief. There is no history of asthma, bronchiectasis, bronchitis, COPD, emphysema, environmental allergies or pneumonia.     Constitution: Positive for chills. Negative for fever.   HENT:  Positive for postnasal drip, sinus pressure and sore throat. Negative for ear pain.    Cardiovascular:  Negative for chest pain.   Respiratory:  Positive for cough. Negative for bloody sputum, shortness of breath and wheezing.    Gastrointestinal:  Negative for heartburn.   Musculoskeletal:  Negative for muscle ache.   Skin:  Negative for rash.   Allergic/Immunologic: Negative for environmental allergies.   Neurological:  Positive for headaches.     Objective:      Physical Exam   Constitutional: He is oriented to person, place, and time. He appears well-developed. He is cooperative.  Non-toxic appearance. He does not appear ill. No distress.      Comments:Patient sits " comfortably in exam chair. Answers questions in complete sentences. Does not show any signs of distress or discoloration.        HENT:   Head: Normocephalic and atraumatic.   Ears:   Right Ear: Hearing, tympanic membrane, external ear and ear canal normal.   Left Ear: Hearing, tympanic membrane, external ear and ear canal normal.   Nose: Congestion present. No mucosal edema, rhinorrhea or nasal deformity. No epistaxis. Right sinus exhibits no maxillary sinus tenderness and no frontal sinus tenderness. Left sinus exhibits no maxillary sinus tenderness and no frontal sinus tenderness.   Mouth/Throat: Uvula is midline, oropharynx is clear and moist and mucous membranes are normal. No trismus in the jaw. Normal dentition. No uvula swelling. No oropharyngeal exudate, posterior oropharyngeal edema or posterior oropharyngeal erythema.   Eyes: Conjunctivae and lids are normal. No scleral icterus.   Neck: Trachea normal and phonation normal. Neck supple. No edema present. No erythema present. No neck rigidity present.   Cardiovascular: Normal rate, regular rhythm, normal heart sounds and normal pulses.   Pulmonary/Chest: Effort normal and breath sounds normal. No stridor. No respiratory distress. He has no decreased breath sounds. He has no wheezes. He has no rhonchi. He has no rales.   Cough with excessive talking on exam.          Comments: Cough with excessive talking on exam.     Abdominal: Normal appearance.   Musculoskeletal: Normal range of motion.         General: No deformity. Normal range of motion.   Neurological: He is alert and oriented to person, place, and time. He exhibits normal muscle tone. Coordination normal.   Skin: Skin is warm, dry, intact, not diaphoretic and not pale.   Psychiatric: His speech is normal and behavior is normal. Judgment and thought content normal.   Nursing note and vitals reviewed.      Results for orders placed or performed in visit on 11/05/22   SARS Coronavirus 2 Antigen, POCT  Manual Read   Result Value Ref Range    SARS Coronavirus 2 Antigen Negative Negative     Acceptable Yes    POCT Influenza A/B MOLECULAR   Result Value Ref Range    POC Molecular Influenza A Ag Negative Negative, Not Reported    POC Molecular Influenza B Ag Negative Negative, Not Reported     Acceptable Yes        Assessment:       1. Cough, unspecified type    2. Type 2 diabetes mellitus without complication, without long-term current use of insulin    3. Acute bronchitis, unspecified organism          Plan:         Cough, unspecified type  -     SARS Coronavirus 2 Antigen, POCT Manual Read  -     POCT Influenza A/B MOLECULAR  -     benzonatate (TESSALON) 200 MG capsule; Take 1 capsule (200 mg total) by mouth 3 (three) times daily as needed for Cough.  Dispense: 30 capsule; Refill: 0  -     guaiFENesin-codeine 100-10 mg/5 ml (TUSSI-ORGANIDIN NR)  mg/5 mL syrup; Take 5 mLs by mouth every 6 (six) hours as needed for Cough or Congestion.  Dispense: 118 mL; Refill: 0    Type 2 diabetes mellitus without complication, without long-term current use of insulin  -     Cancel: POCT Glucose, Hand-Held Device    Acute bronchitis, unspecified organism  -     benzonatate (TESSALON) 200 MG capsule; Take 1 capsule (200 mg total) by mouth 3 (three) times daily as needed for Cough.  Dispense: 30 capsule; Refill: 0  -     guaiFENesin-codeine 100-10 mg/5 ml (TUSSI-ORGANIDIN NR)  mg/5 mL syrup; Take 5 mLs by mouth every 6 (six) hours as needed for Cough or Congestion.  Dispense: 118 mL; Refill: 0               Patient Instructions   Today you have been given a narcotic. DO NOT operate heavy machinery while on this medication. Do not take other narcotics or drink alcohol while on this medication. Do not take more than the recommended dose. If you start having trouble breathing or develop an allergic reaction, go to the emergency room immediately.   - Rest.    - Drink plenty of fluids.    -  Acetaminophen (tylenol) or Ibuprofen (advil,motrin) as directed as needed for fever/pain. Avoid tylenol if you have a history of liver disease. Do not take ibuprofen if you have a history of GI bleeding, kidney disease, or if you take blood thinners.   - Ibuprofen dosing for adults: 400 mg by mouth every 4-6 hours as needed. Max: 2400 mg/day; Info: use lowest effective dose, shortest effective treatment duration; give w/ food if GI upset occurs.  - Ibuprofen dosing for children: [6 mo-10 yo] Dose: 5-10 mg/kg/dose by mouth every 6-8h as needed; Max: 40 mg/kg/day; Info: use lower dose for fever <102.5 F, higher dose for fever >102.5 F; use shortest effective tx duration; give w/ food if GI upset occurs. [13 yo and older] Dose: 200-400 mg by mouth every 4-6 hours as needed; Max: 1200 mg/day; Info: use lowest effective dose, shortest effective tx duration; give w/ food if GI upset occurs.  - Tylenol dosing for adults: [By mouth route, immediate-release form] Dose: 325-1000 mg by mouth every 4-6h as needed; Max: 1 g/4h and 4 g/day from all sources. [By mouth route, extended-release form] Dose: 650-1300 mg Extended Release by mouth every 8h as needed; Max: 4 g/day from all sources.   - Tylenol dosing for children: 6-10 yo [ oral tablet/capsule ] Dose: 1 tab/cap by mouth every 4-6h as needed; Max: 5 tabs or caps/24h; Info: do not exceed 75 mg/kg/day, up to 1 g/4h and 4 g/day, from all sources. 13 yo and older [ oral tablet/capsule ] Dose: 1-2 tabs/caps by mouth every 4-6h as needed; Max: 10 tabs or caps/24h; Info: do not exceed 1 g/4h and 4 g/day from all sources.    - You must understand that you have received an Urgent Care treatment only and that you may be released before all of your medical problems are known or treated.   - You, the patient, will arrange for follow up care as instructed.   - If your condition worsens or fails to improve we recommend that you receive another evaluation at the ER immediately or contact  your PCP to discuss your concerns or return here.   - Follow up with your PCP or specialty clinic as directed in the next 1-2 weeks if not improved or as needed.  You can call [phone] to schedule an appointment with the appropriate provider.  If your symptoms do not improve or worsen, go to the emergency room immediately.

## 2022-11-16 ENCOUNTER — PATIENT MESSAGE (OUTPATIENT)
Dept: FAMILY MEDICINE | Facility: CLINIC | Age: 57
End: 2022-11-16
Payer: COMMERCIAL

## 2022-11-19 ENCOUNTER — PATIENT MESSAGE (OUTPATIENT)
Dept: FAMILY MEDICINE | Facility: CLINIC | Age: 57
End: 2022-11-19
Payer: COMMERCIAL

## 2022-11-28 ENCOUNTER — TELEPHONE (OUTPATIENT)
Dept: FAMILY MEDICINE | Facility: CLINIC | Age: 57
End: 2022-11-28
Payer: COMMERCIAL

## 2022-11-28 RX ORDER — METHYLPHENIDATE HYDROCHLORIDE 54 MG/1
54 TABLET ORAL
COMMUNITY
Start: 2022-11-20 | End: 2023-01-19

## 2022-11-28 RX ORDER — ZOSTER VACCINE RECOMBINANT, ADJUVANTED 50 MCG/0.5
KIT INTRAMUSCULAR
COMMUNITY
Start: 2022-07-16

## 2022-11-29 VITALS — DIASTOLIC BLOOD PRESSURE: 76 MMHG | SYSTOLIC BLOOD PRESSURE: 138 MMHG

## 2022-12-08 ENCOUNTER — OFFICE VISIT (OUTPATIENT)
Dept: FAMILY MEDICINE | Facility: CLINIC | Age: 57
End: 2022-12-08
Attending: FAMILY MEDICINE
Payer: COMMERCIAL

## 2022-12-08 ENCOUNTER — LAB VISIT (OUTPATIENT)
Dept: LAB | Facility: HOSPITAL | Age: 57
End: 2022-12-08
Attending: FAMILY MEDICINE
Payer: COMMERCIAL

## 2022-12-08 VITALS
WEIGHT: 223.13 LBS | BODY MASS INDEX: 31.94 KG/M2 | DIASTOLIC BLOOD PRESSURE: 70 MMHG | HEART RATE: 78 BPM | HEIGHT: 70 IN | SYSTOLIC BLOOD PRESSURE: 130 MMHG

## 2022-12-08 DIAGNOSIS — E11.9 TYPE 2 DIABETES MELLITUS WITHOUT COMPLICATION, UNSPECIFIED WHETHER LONG TERM INSULIN USE: ICD-10-CM

## 2022-12-08 DIAGNOSIS — E11.9 TYPE 2 DIABETES MELLITUS WITHOUT COMPLICATION, UNSPECIFIED WHETHER LONG TERM INSULIN USE: Primary | ICD-10-CM

## 2022-12-08 DIAGNOSIS — E78.2 MIXED HYPERLIPIDEMIA: ICD-10-CM

## 2022-12-08 DIAGNOSIS — I10 ESSENTIAL HYPERTENSION: ICD-10-CM

## 2022-12-08 LAB
ALBUMIN SERPL BCP-MCNC: 4 G/DL (ref 3.5–5.2)
ALP SERPL-CCNC: 93 U/L (ref 55–135)
ALT SERPL W/O P-5'-P-CCNC: 54 U/L (ref 10–44)
ANION GAP SERPL CALC-SCNC: 10 MMOL/L (ref 8–16)
AST SERPL-CCNC: 27 U/L (ref 10–40)
BILIRUB SERPL-MCNC: 0.5 MG/DL (ref 0.1–1)
BUN SERPL-MCNC: 17 MG/DL (ref 6–20)
CALCIUM SERPL-MCNC: 9.7 MG/DL (ref 8.7–10.5)
CHLORIDE SERPL-SCNC: 100 MMOL/L (ref 95–110)
CHOLEST SERPL-MCNC: 226 MG/DL (ref 120–199)
CHOLEST/HDLC SERPL: 5.8 {RATIO} (ref 2–5)
CO2 SERPL-SCNC: 23 MMOL/L (ref 23–29)
CREAT SERPL-MCNC: 0.8 MG/DL (ref 0.5–1.4)
EST. GFR  (NO RACE VARIABLE): >60 ML/MIN/1.73 M^2
ESTIMATED AVG GLUCOSE: 237 MG/DL (ref 68–131)
GLUCOSE SERPL-MCNC: 278 MG/DL (ref 70–110)
GLUCOSE SERPL-MCNC: 285 MG/DL (ref 70–110)
HBA1C MFR BLD: 9.9 % (ref 4–5.6)
HDLC SERPL-MCNC: 39 MG/DL (ref 40–75)
HDLC SERPL: 17.3 % (ref 20–50)
LDLC SERPL CALC-MCNC: 118 MG/DL (ref 63–159)
NONHDLC SERPL-MCNC: 187 MG/DL
POTASSIUM SERPL-SCNC: 4.4 MMOL/L (ref 3.5–5.1)
PROT SERPL-MCNC: 7.2 G/DL (ref 6–8.4)
SODIUM SERPL-SCNC: 133 MMOL/L (ref 136–145)
TRIGL SERPL-MCNC: 345 MG/DL (ref 30–150)

## 2022-12-08 PROCEDURE — 3066F PR DOCUMENTATION OF TREATMENT FOR NEPHROPATHY: ICD-10-PCS | Mod: CPTII,S$GLB,, | Performed by: FAMILY MEDICINE

## 2022-12-08 PROCEDURE — 1159F MED LIST DOCD IN RCRD: CPT | Mod: CPTII,S$GLB,, | Performed by: FAMILY MEDICINE

## 2022-12-08 PROCEDURE — 1160F RVW MEDS BY RX/DR IN RCRD: CPT | Mod: CPTII,S$GLB,, | Performed by: FAMILY MEDICINE

## 2022-12-08 PROCEDURE — 36415 COLL VENOUS BLD VENIPUNCTURE: CPT | Mod: PO | Performed by: FAMILY MEDICINE

## 2022-12-08 PROCEDURE — 3046F PR MOST RECENT HEMOGLOBIN A1C LEVEL > 9.0%: ICD-10-PCS | Mod: CPTII,S$GLB,, | Performed by: FAMILY MEDICINE

## 2022-12-08 PROCEDURE — 99999 PR PBB SHADOW E&M-EST. PATIENT-LVL V: CPT | Mod: PBBFAC,,, | Performed by: FAMILY MEDICINE

## 2022-12-08 PROCEDURE — 3075F PR MOST RECENT SYSTOLIC BLOOD PRESS GE 130-139MM HG: ICD-10-PCS | Mod: CPTII,S$GLB,, | Performed by: FAMILY MEDICINE

## 2022-12-08 PROCEDURE — 4010F PR ACE/ARB THEARPY RXD/TAKEN: ICD-10-PCS | Mod: CPTII,S$GLB,, | Performed by: FAMILY MEDICINE

## 2022-12-08 PROCEDURE — 99214 PR OFFICE/OUTPT VISIT, EST, LEVL IV, 30-39 MIN: ICD-10-PCS | Mod: S$GLB,,, | Performed by: FAMILY MEDICINE

## 2022-12-08 PROCEDURE — 1160F PR REVIEW ALL MEDS BY PRESCRIBER/CLIN PHARMACIST DOCUMENTED: ICD-10-PCS | Mod: CPTII,S$GLB,, | Performed by: FAMILY MEDICINE

## 2022-12-08 PROCEDURE — 3078F DIAST BP <80 MM HG: CPT | Mod: CPTII,S$GLB,, | Performed by: FAMILY MEDICINE

## 2022-12-08 PROCEDURE — 3008F BODY MASS INDEX DOCD: CPT | Mod: CPTII,S$GLB,, | Performed by: FAMILY MEDICINE

## 2022-12-08 PROCEDURE — 80053 COMPREHEN METABOLIC PANEL: CPT | Performed by: FAMILY MEDICINE

## 2022-12-08 PROCEDURE — 99214 OFFICE O/P EST MOD 30 MIN: CPT | Mod: S$GLB,,, | Performed by: FAMILY MEDICINE

## 2022-12-08 PROCEDURE — 4010F ACE/ARB THERAPY RXD/TAKEN: CPT | Mod: CPTII,S$GLB,, | Performed by: FAMILY MEDICINE

## 2022-12-08 PROCEDURE — 83036 HEMOGLOBIN GLYCOSYLATED A1C: CPT | Performed by: FAMILY MEDICINE

## 2022-12-08 PROCEDURE — 82962 GLUCOSE BLOOD TEST: CPT | Mod: S$GLB,,, | Performed by: FAMILY MEDICINE

## 2022-12-08 PROCEDURE — 3075F SYST BP GE 130 - 139MM HG: CPT | Mod: CPTII,S$GLB,, | Performed by: FAMILY MEDICINE

## 2022-12-08 PROCEDURE — 3060F POS MICROALBUMINURIA REV: CPT | Mod: CPTII,S$GLB,, | Performed by: FAMILY MEDICINE

## 2022-12-08 PROCEDURE — 80061 LIPID PANEL: CPT | Performed by: FAMILY MEDICINE

## 2022-12-08 PROCEDURE — 3060F PR POS MICROALBUMINURIA RESULT DOCUMENTED/REVIEW: ICD-10-PCS | Mod: CPTII,S$GLB,, | Performed by: FAMILY MEDICINE

## 2022-12-08 PROCEDURE — 3066F NEPHROPATHY DOC TX: CPT | Mod: CPTII,S$GLB,, | Performed by: FAMILY MEDICINE

## 2022-12-08 PROCEDURE — 82962 POCT GLUCOSE, HAND-HELD DEVICE: ICD-10-PCS | Mod: S$GLB,,, | Performed by: FAMILY MEDICINE

## 2022-12-08 PROCEDURE — 3008F PR BODY MASS INDEX (BMI) DOCUMENTED: ICD-10-PCS | Mod: CPTII,S$GLB,, | Performed by: FAMILY MEDICINE

## 2022-12-08 PROCEDURE — 3046F HEMOGLOBIN A1C LEVEL >9.0%: CPT | Mod: CPTII,S$GLB,, | Performed by: FAMILY MEDICINE

## 2022-12-08 PROCEDURE — 1159F PR MEDICATION LIST DOCUMENTED IN MEDICAL RECORD: ICD-10-PCS | Mod: CPTII,S$GLB,, | Performed by: FAMILY MEDICINE

## 2022-12-08 PROCEDURE — 99999 PR PBB SHADOW E&M-EST. PATIENT-LVL V: ICD-10-PCS | Mod: PBBFAC,,, | Performed by: FAMILY MEDICINE

## 2022-12-08 PROCEDURE — 3078F PR MOST RECENT DIASTOLIC BLOOD PRESSURE < 80 MM HG: ICD-10-PCS | Mod: CPTII,S$GLB,, | Performed by: FAMILY MEDICINE

## 2022-12-08 RX ORDER — TIRZEPATIDE 2.5 MG/.5ML
2.5 INJECTION, SOLUTION SUBCUTANEOUS
Qty: 4 PEN | Refills: 1 | Status: SHIPPED | OUTPATIENT
Start: 2022-12-08 | End: 2023-01-10

## 2022-12-08 RX ORDER — METFORMIN HYDROCHLORIDE 500 MG/1
500 TABLET, EXTENDED RELEASE ORAL DAILY
Qty: 270 TABLET | Refills: 3
Start: 2022-12-08 | End: 2023-01-10 | Stop reason: SDUPTHER

## 2022-12-08 RX ORDER — OMEGA-3-ACID ETHYL ESTERS 1 G/1
2 CAPSULE, LIQUID FILLED ORAL 2 TIMES DAILY
Qty: 360 CAPSULE | Refills: 3 | Status: SHIPPED | OUTPATIENT
Start: 2022-12-08 | End: 2023-12-26

## 2022-12-08 NOTE — PROGRESS NOTES
"Subjective:       Patient ID: Wily Rodrigues is a 57 y.o. male.    Chief Complaint: Diabetes    Diabetes  Pertinent negatives for diabetes include no chest pain, no fatigue, no polydipsia and no polyuria.   Pt is here for follow up of dm stable on metformin actos and amaryl he is not checking his blood sugars he is not on an ada diet  Pt has htn bp fine today no sob/cp  Pt has hypercholesterolemia on statin no muscle aches   Review of Systems   Constitutional:  Negative for chills, fatigue and fever.   Respiratory:  Negative for cough, chest tightness and shortness of breath.    Cardiovascular:  Negative for chest pain and palpitations.   Gastrointestinal:  Negative for abdominal distention, abdominal pain and blood in stool.   Endocrine: Negative for polydipsia and polyuria.     Objective:    Ht 5' 10" (1.778 m)   Wt 101.2 kg (223 lb 1.6 oz)   BMI 32.01 kg/m²   /70   Pulse 78   Ht 5' 10" (1.778 m)   Wt 101.2 kg (223 lb 1.6 oz)   BMI 32.01 kg/m²     Physical Exam  Constitutional:       Appearance: He is obese. He is not ill-appearing.   Cardiovascular:      Rate and Rhythm: Normal rate and regular rhythm.      Heart sounds:     No gallop.   Pulmonary:      Effort: Pulmonary effort is normal. No respiratory distress.      Breath sounds: No rales.   Neurological:      General: No focal deficit present.      Mental Status: He is alert and oriented to person, place, and time.      Cranial Nerves: No cranial nerve deficit.      Coordination: Coordination normal.     Hgb a1c 10.2 in 9/2022  Assessment:       1. Type 2 diabetes mellitus without complication, unspecified whether long term insulin use    2. Essential hypertension    3. Mixed hyperlipidemia          Plan:     Orders cmp lipid hgb a1c  Cont meds  Ada diet  Graded exercise  Rtc quarterly and 1 month virtual        "This note will not be shared with the patient."       "

## 2022-12-08 NOTE — PATIENT INSTRUCTIONS
Wily,     We are always striving for excellence. Should you receive a patient experience survey via text message, electronically, or by mail, we would appreciate if you would take a few moments to give us your feedback. These surveys let us know our strengths as well as areas of opportunity for improvement to better serve you.    Thank you for your time,  Rebeca Daniel LPN      Your test results will be communicated to you via : My [facility], Telephone or Letter.   If you have not received your test results in one week, please contact the clinic at [phone].

## 2022-12-11 ENCOUNTER — PATIENT MESSAGE (OUTPATIENT)
Dept: FAMILY MEDICINE | Facility: CLINIC | Age: 57
End: 2022-12-11
Payer: COMMERCIAL

## 2022-12-12 ENCOUNTER — TELEPHONE (OUTPATIENT)
Dept: FAMILY MEDICINE | Facility: CLINIC | Age: 57
End: 2022-12-12
Payer: COMMERCIAL

## 2022-12-12 NOTE — TELEPHONE ENCOUNTER
----- Message from Areli Kramer MD sent at 12/11/2022  8:55 PM CST -----  Regarding: dm  Please help pt make a dm f/u with fbs log and meds available.  Please schedule for next week and keep the 1/10 visit

## 2022-12-15 ENCOUNTER — TELEPHONE (OUTPATIENT)
Dept: FAMILY MEDICINE | Facility: CLINIC | Age: 57
End: 2022-12-15
Payer: COMMERCIAL

## 2022-12-15 NOTE — TELEPHONE ENCOUNTER
----- Message from Chiqui Carey sent at 12/15/2022  3:13 PM CST -----  Regarding: Virtual  I called patient several times each day since I received the message I left a message each time.No answer no call back.

## 2022-12-20 ENCOUNTER — OFFICE VISIT (OUTPATIENT)
Dept: URGENT CARE | Facility: CLINIC | Age: 57
End: 2022-12-20
Payer: COMMERCIAL

## 2022-12-20 VITALS
TEMPERATURE: 99 F | SYSTOLIC BLOOD PRESSURE: 147 MMHG | BODY MASS INDEX: 31.92 KG/M2 | RESPIRATION RATE: 18 BRPM | WEIGHT: 223 LBS | OXYGEN SATURATION: 97 % | DIASTOLIC BLOOD PRESSURE: 84 MMHG | HEIGHT: 70 IN | HEART RATE: 87 BPM

## 2022-12-20 DIAGNOSIS — R05.9 COUGH, UNSPECIFIED TYPE: ICD-10-CM

## 2022-12-20 DIAGNOSIS — J06.9 VIRAL URI WITH COUGH: Primary | ICD-10-CM

## 2022-12-20 LAB
CTP QC/QA: YES
SARS-COV-2 AG RESP QL IA.RAPID: NEGATIVE

## 2022-12-20 PROCEDURE — 3060F PR POS MICROALBUMINURIA RESULT DOCUMENTED/REVIEW: ICD-10-PCS | Mod: CPTII,S$GLB,, | Performed by: NURSE PRACTITIONER

## 2022-12-20 PROCEDURE — 3066F NEPHROPATHY DOC TX: CPT | Mod: CPTII,S$GLB,, | Performed by: NURSE PRACTITIONER

## 2022-12-20 PROCEDURE — 1160F PR REVIEW ALL MEDS BY PRESCRIBER/CLIN PHARMACIST DOCUMENTED: ICD-10-PCS | Mod: CPTII,S$GLB,, | Performed by: NURSE PRACTITIONER

## 2022-12-20 PROCEDURE — 3077F PR MOST RECENT SYSTOLIC BLOOD PRESSURE >= 140 MM HG: ICD-10-PCS | Mod: CPTII,S$GLB,, | Performed by: NURSE PRACTITIONER

## 2022-12-20 PROCEDURE — 3077F SYST BP >= 140 MM HG: CPT | Mod: CPTII,S$GLB,, | Performed by: NURSE PRACTITIONER

## 2022-12-20 PROCEDURE — 3008F BODY MASS INDEX DOCD: CPT | Mod: CPTII,S$GLB,, | Performed by: NURSE PRACTITIONER

## 2022-12-20 PROCEDURE — 87811 SARS-COV-2 COVID19 W/OPTIC: CPT | Mod: QW,S$GLB,, | Performed by: NURSE PRACTITIONER

## 2022-12-20 PROCEDURE — 3079F DIAST BP 80-89 MM HG: CPT | Mod: CPTII,S$GLB,, | Performed by: NURSE PRACTITIONER

## 2022-12-20 PROCEDURE — 99214 OFFICE O/P EST MOD 30 MIN: CPT | Mod: S$GLB,,, | Performed by: NURSE PRACTITIONER

## 2022-12-20 PROCEDURE — 1160F RVW MEDS BY RX/DR IN RCRD: CPT | Mod: CPTII,S$GLB,, | Performed by: NURSE PRACTITIONER

## 2022-12-20 PROCEDURE — 3046F PR MOST RECENT HEMOGLOBIN A1C LEVEL > 9.0%: ICD-10-PCS | Mod: CPTII,S$GLB,, | Performed by: NURSE PRACTITIONER

## 2022-12-20 PROCEDURE — 1159F MED LIST DOCD IN RCRD: CPT | Mod: CPTII,S$GLB,, | Performed by: NURSE PRACTITIONER

## 2022-12-20 PROCEDURE — 3008F PR BODY MASS INDEX (BMI) DOCUMENTED: ICD-10-PCS | Mod: CPTII,S$GLB,, | Performed by: NURSE PRACTITIONER

## 2022-12-20 PROCEDURE — 4010F PR ACE/ARB THEARPY RXD/TAKEN: ICD-10-PCS | Mod: CPTII,S$GLB,, | Performed by: NURSE PRACTITIONER

## 2022-12-20 PROCEDURE — 3046F HEMOGLOBIN A1C LEVEL >9.0%: CPT | Mod: CPTII,S$GLB,, | Performed by: NURSE PRACTITIONER

## 2022-12-20 PROCEDURE — 4010F ACE/ARB THERAPY RXD/TAKEN: CPT | Mod: CPTII,S$GLB,, | Performed by: NURSE PRACTITIONER

## 2022-12-20 PROCEDURE — 3060F POS MICROALBUMINURIA REV: CPT | Mod: CPTII,S$GLB,, | Performed by: NURSE PRACTITIONER

## 2022-12-20 PROCEDURE — 99214 PR OFFICE/OUTPT VISIT, EST, LEVL IV, 30-39 MIN: ICD-10-PCS | Mod: S$GLB,,, | Performed by: NURSE PRACTITIONER

## 2022-12-20 PROCEDURE — 3079F PR MOST RECENT DIASTOLIC BLOOD PRESSURE 80-89 MM HG: ICD-10-PCS | Mod: CPTII,S$GLB,, | Performed by: NURSE PRACTITIONER

## 2022-12-20 PROCEDURE — 87811 SARS CORONAVIRUS 2 ANTIGEN POCT, MANUAL READ: ICD-10-PCS | Mod: QW,S$GLB,, | Performed by: NURSE PRACTITIONER

## 2022-12-20 PROCEDURE — 3066F PR DOCUMENTATION OF TREATMENT FOR NEPHROPATHY: ICD-10-PCS | Mod: CPTII,S$GLB,, | Performed by: NURSE PRACTITIONER

## 2022-12-20 PROCEDURE — 1159F PR MEDICATION LIST DOCUMENTED IN MEDICAL RECORD: ICD-10-PCS | Mod: CPTII,S$GLB,, | Performed by: NURSE PRACTITIONER

## 2022-12-20 RX ORDER — CODEINE PHOSPHATE AND GUAIFENESIN 10; 100 MG/5ML; MG/5ML
10 SOLUTION ORAL EVERY 6 HOURS PRN
Qty: 120 ML | Refills: 0 | Status: SHIPPED | OUTPATIENT
Start: 2022-12-20 | End: 2024-02-28

## 2022-12-20 RX ORDER — BENZONATATE 200 MG/1
200 CAPSULE ORAL 3 TIMES DAILY PRN
Qty: 30 CAPSULE | Refills: 0 | Status: SHIPPED | OUTPATIENT
Start: 2022-12-20 | End: 2022-12-30

## 2022-12-20 NOTE — PATIENT INSTRUCTIONS
Please drink plenty of fluids.  Please get plenty of rest.  Please return here or go to the Emergency Department for any concerns or worsening of condition.  If you were prescribed a narcotic medication, do not drive or operate heavy equipment or machinery while taking these medications.  If you do not have Hypertension or any history of palpitations, it is ok to take over the counter Sudafed or Mucinex D or Allegra-D or Claritin-D or Zyrtec-D.  If you do take one of the above, it is ok to combine that with plain over the counter Mucinex or Allegra or Claritin or Zyrtec.  If for example you are taking Zyrtec -D, you can combine that with Mucinex, but not Mucinex-D.  If you are taking Mucinex-D, you can combine that with plain Allegra or Claritin or Zyrtec.   If you do have Hypertension or palpitations, it is safe to take Coricidin HBP for relief of sinus symptoms.  If not allergic, please take over the counter Tylenol (Acetaminophen) and/or Motrin (Ibuprofen) as directed for control of pain and/or fever.  Please follow up with your primary care doctor or specialist as needed.    If you  smoke, please stop smoking.

## 2022-12-20 NOTE — PROGRESS NOTES
"Subjective:       Patient ID: Wily Rodrigues is a 57 y.o. male.    Vitals:  height is 5' 10" (1.778 m) and weight is 101.2 kg (223 lb). His temperature is 99 °F (37.2 °C). His blood pressure is 147/84 (abnormal) and his pulse is 87. His respiration is 18 and oxygen saturation is 97%.     Chief Complaint: Cough    Patient presents with concern of bronchitis.  States that he has associated hoarseness and drip.  Denies fever, shortness of breath, or chest pain.  Hx of Asthma.  Patient states that he's had relief in the past with codeine cough syrup and tessalon.  He does not need a refill of his albuterol inhaler.  He used it this morning.  He was around someone who was covid 19 positive on Friday at a holiday party.  States he's done lots of activities this weekend.  Denies flu like symptoms.      Cough  This is a new problem. The current episode started in the past 7 days. The problem has been unchanged. The problem occurs hourly. The cough is Non-productive. Associated symptoms include postnasal drip. Pertinent negatives include no chest pain, chills, ear congestion, ear pain, fever, headaches, heartburn, hemoptysis, myalgias, nasal congestion, rash, rhinorrhea, sore throat, shortness of breath, sweats, weight loss or wheezing. Nothing aggravates the symptoms. He has tried OTC cough suppressant for the symptoms. The treatment provided mild relief. There is no history of asthma, bronchitis, COPD or pneumonia.     Constitution: Negative for chills, fatigue and fever.   HENT:  Positive for congestion, postnasal drip and voice change. Negative for ear pain, sinus pain, sinus pressure, sore throat and trouble swallowing.    Cardiovascular:  Negative for chest pain.   Respiratory:  Positive for cough. Negative for chest tightness, sputum production, bloody sputum, shortness of breath and wheezing.    Gastrointestinal:  Negative for heartburn.   Musculoskeletal:  Negative for muscle ache.   Skin:  Negative for rash. "   Neurological:  Negative for headaches.     Objective:      Physical Exam   Constitutional: He is oriented to person, place, and time. He appears well-developed. He is cooperative.  Non-toxic appearance. He does not appear ill. No distress.   HENT:   Head: Normocephalic and atraumatic.   Ears:   Right Ear: Hearing, tympanic membrane, external ear and ear canal normal.   Left Ear: Hearing, tympanic membrane, external ear and ear canal normal.   Nose: Mucosal edema and rhinorrhea present. No purulent discharge or nasal deformity. No epistaxis. Right sinus exhibits no maxillary sinus tenderness and no frontal sinus tenderness. Left sinus exhibits no maxillary sinus tenderness and no frontal sinus tenderness.   Mouth/Throat: Uvula is midline, oropharynx is clear and moist and mucous membranes are normal. No trismus in the jaw. Normal dentition. No uvula swelling. No oropharyngeal exudate, posterior oropharyngeal edema or posterior oropharyngeal erythema.   Eyes: Conjunctivae and lids are normal. No scleral icterus.   Neck: Trachea normal and phonation normal. Neck supple. No edema present. No erythema present. No neck rigidity present.   Cardiovascular: Normal rate, regular rhythm, normal heart sounds and normal pulses.   Pulmonary/Chest: Effort normal and breath sounds normal. No respiratory distress. He has no decreased breath sounds. He has no wheezes. He has no rhonchi.   Abdominal: Normal appearance.   Musculoskeletal: Normal range of motion.         General: No deformity. Normal range of motion.   Lymphadenopathy:     He has no cervical adenopathy.   Neurological: He is alert and oriented to person, place, and time. He exhibits normal muscle tone. Coordination normal.   Skin: Skin is warm, dry, intact, not diaphoretic and not pale.   Psychiatric: His speech is normal and behavior is normal. Judgment and thought content normal.   Nursing note and vitals reviewed.      Results for orders placed or performed in  visit on 12/20/22   SARS Coronavirus 2 Antigen, POCT Manual Read   Result Value Ref Range    SARS Coronavirus 2 Antigen Negative Negative     Acceptable Yes        Assessment:       1. Viral URI with cough    2. Cough, unspecified type          Plan:       Lab reviewed.  Viral URI with cough  -     guaiFENesin-codeine 100-10 mg/5 ml (TUSSI-ORGANIDIN NR)  mg/5 mL syrup; Take 10 mLs by mouth every 6 (six) hours as needed for Cough.  Dispense: 120 mL; Refill: 0  -     benzonatate (TESSALON) 200 MG capsule; Take 1 capsule (200 mg total) by mouth 3 (three) times daily as needed for Cough.  Dispense: 30 capsule; Refill: 0    Cough, unspecified type  -     SARS Coronavirus 2 Antigen, POCT Manual Read  -     guaiFENesin-codeine 100-10 mg/5 ml (TUSSI-ORGANIDIN NR)  mg/5 mL syrup; Take 10 mLs by mouth every 6 (six) hours as needed for Cough.  Dispense: 120 mL; Refill: 0  -     benzonatate (TESSALON) 200 MG capsule; Take 1 capsule (200 mg total) by mouth 3 (three) times daily as needed for Cough.  Dispense: 30 capsule; Refill: 0      Patient Instructions   Please drink plenty of fluids.  Please get plenty of rest.  Please return here or go to the Emergency Department for any concerns or worsening of condition.  If you were prescribed a narcotic medication, do not drive or operate heavy equipment or machinery while taking these medications.  If you do not have Hypertension or any history of palpitations, it is ok to take over the counter Sudafed or Mucinex D or Allegra-D or Claritin-D or Zyrtec-D.  If you do take one of the above, it is ok to combine that with plain over the counter Mucinex or Allegra or Claritin or Zyrtec.  If for example you are taking Zyrtec -D, you can combine that with Mucinex, but not Mucinex-D.  If you are taking Mucinex-D, you can combine that with plain Allegra or Claritin or Zyrtec.   If you do have Hypertension or palpitations, it is safe to take Coricidin HBP for relief of  sinus symptoms.  If not allergic, please take over the counter Tylenol (Acetaminophen) and/or Motrin (Ibuprofen) as directed for control of pain and/or fever.  Please follow up with your primary care doctor or specialist as needed.    If you  smoke, please stop smoking.

## 2023-01-10 ENCOUNTER — OFFICE VISIT (OUTPATIENT)
Dept: FAMILY MEDICINE | Facility: CLINIC | Age: 58
End: 2023-01-10
Attending: FAMILY MEDICINE
Payer: COMMERCIAL

## 2023-01-10 DIAGNOSIS — E78.2 MIXED HYPERLIPIDEMIA: ICD-10-CM

## 2023-01-10 DIAGNOSIS — I10 ESSENTIAL HYPERTENSION: ICD-10-CM

## 2023-01-10 DIAGNOSIS — E11.65 TYPE 2 DIABETES MELLITUS WITH HYPERGLYCEMIA, WITHOUT LONG-TERM CURRENT USE OF INSULIN: Primary | ICD-10-CM

## 2023-01-10 DIAGNOSIS — J44.9 CHRONIC OBSTRUCTIVE PULMONARY DISEASE, UNSPECIFIED COPD TYPE: ICD-10-CM

## 2023-01-10 PROCEDURE — 1160F RVW MEDS BY RX/DR IN RCRD: CPT | Mod: CPTII,95,, | Performed by: FAMILY MEDICINE

## 2023-01-10 PROCEDURE — 3008F BODY MASS INDEX DOCD: CPT | Mod: CPTII,95,, | Performed by: FAMILY MEDICINE

## 2023-01-10 PROCEDURE — 1159F MED LIST DOCD IN RCRD: CPT | Mod: CPTII,95,, | Performed by: FAMILY MEDICINE

## 2023-01-10 PROCEDURE — 1160F PR REVIEW ALL MEDS BY PRESCRIBER/CLIN PHARMACIST DOCUMENTED: ICD-10-PCS | Mod: CPTII,95,, | Performed by: FAMILY MEDICINE

## 2023-01-10 PROCEDURE — 99214 PR OFFICE/OUTPT VISIT, EST, LEVL IV, 30-39 MIN: ICD-10-PCS | Mod: 95,,, | Performed by: FAMILY MEDICINE

## 2023-01-10 PROCEDURE — 3008F PR BODY MASS INDEX (BMI) DOCUMENTED: ICD-10-PCS | Mod: CPTII,95,, | Performed by: FAMILY MEDICINE

## 2023-01-10 PROCEDURE — 99214 OFFICE O/P EST MOD 30 MIN: CPT | Mod: 95,,, | Performed by: FAMILY MEDICINE

## 2023-01-10 PROCEDURE — 1159F PR MEDICATION LIST DOCUMENTED IN MEDICAL RECORD: ICD-10-PCS | Mod: CPTII,95,, | Performed by: FAMILY MEDICINE

## 2023-01-10 RX ORDER — METFORMIN HYDROCHLORIDE 500 MG/1
1000 TABLET, EXTENDED RELEASE ORAL DAILY
Qty: 270 TABLET | Refills: 3
Start: 2023-01-10 | End: 2023-09-04 | Stop reason: SDUPTHER

## 2023-01-10 RX ORDER — TIRZEPATIDE 5 MG/.5ML
5 INJECTION, SOLUTION SUBCUTANEOUS
Qty: 4 PEN | Refills: 11 | Status: SHIPPED | OUTPATIENT
Start: 2023-01-10 | End: 2023-03-09

## 2023-01-10 NOTE — PROGRESS NOTES
The patient location is: [place]  The chief complaint leading to consultation is: dm    Visit type: audiovisual    Face to Face time with patient: 20  30 minutes of total time spent on the encounter, which includes face to face time and non-face to face time preparing to see the patient (eg, review of tests), Obtaining and/or reviewing separately obtained history, Documenting clinical information in the electronic or other health record, Independently interpreting results (not separately reported) and communicating results to the patient/family/caregiver, or Care coordination (not separately reported).         Each patient to whom he or she provides medical services by telemedicine is:  (1) informed of the relationship between the physician and patient and the respective role of any other health care provider with respect to management of the patient; and (2) notified that he or she may decline to receive medical services by telemedicine and may withdraw from such care at any time.    Notes:   Subjective:       Patient ID: Wily Rodrigues is a 57 y.o. male.    Chief Complaint: No chief complaint on file.    HPI  Pt in virtual visit for f/u of dm he is now on mounjaro actos metformin he has fbs in the mid to upper 100's   Pt is obese he is on metformin and mounjaro not on weight loss diet no regular exercise  Pt has htn bp fine at home on ace no cough no sob/cp  Pt has hypercholesterolemia on statin no muscle aches  Pt has h/o copd no acute complaints no sob/cp no wheeze pt is well  Review of Systems   Constitutional:  Negative for activity change, chills, fatigue, fever and unexpected weight change.   HENT:  Negative for hearing loss, rhinorrhea and trouble swallowing.    Eyes:  Negative for discharge and visual disturbance.   Respiratory:  Negative for cough, chest tightness and wheezing.    Cardiovascular:  Negative for chest pain and palpitations.   Gastrointestinal:  Negative for blood in stool,  "constipation, diarrhea and vomiting.   Endocrine: Negative for polydipsia and polyuria.   Genitourinary:  Negative for difficulty urinating, hematuria and urgency.   Musculoskeletal:  Negative for arthralgias, joint swelling and neck pain.   Neurological:  Negative for weakness and headaches.   Psychiatric/Behavioral:  Negative for confusion and dysphoric mood.      Objective:    Ht 5' 10" (1.778 m)   Wt 101.2 kg (223 lb)   BMI 32.00 kg/m²     Physical Exam  Constitutional:       Appearance: He is obese. He is not ill-appearing.   HENT:      Head: Normocephalic and atraumatic.   Eyes:      Extraocular Movements: Extraocular movements intact.   Pulmonary:      Effort: Pulmonary effort is normal. No respiratory distress.   Neurological:      General: No focal deficit present.      Mental Status: He is alert and oriented to person, place, and time.      Cranial Nerves: No cranial nerve deficit.      Coordination: Coordination normal.   Psychiatric:         Mood and Affect: Mood normal.         Behavior: Behavior normal.         Thought Content: Thought content normal.         Judgment: Judgment normal.     Hgb a1c 9.9 in 12/2022  Assessment:       1. Type 2 diabetes mellitus without complication, unspecified whether long term insulin use    2. Mixed hyperlipidemia        3. Htn   4. copd  Plan:     Orders declined  Increase metformin 2 1000 mg qd  Increase mounjaro to 5 mg q week  Ada diet  Weight loss diet  Graded exercise  Cont meds  Rtc 1 month virtual       "This note will not be shared with the patient."   "

## 2023-01-12 VITALS — HEIGHT: 70 IN | BODY MASS INDEX: 31.92 KG/M2 | WEIGHT: 223 LBS

## 2023-02-23 ENCOUNTER — PATIENT MESSAGE (OUTPATIENT)
Dept: ADMINISTRATIVE | Facility: HOSPITAL | Age: 58
End: 2023-02-23
Payer: COMMERCIAL

## 2023-02-23 ENCOUNTER — PATIENT OUTREACH (OUTPATIENT)
Dept: ADMINISTRATIVE | Facility: HOSPITAL | Age: 58
End: 2023-02-23
Payer: COMMERCIAL

## 2023-02-24 ENCOUNTER — TELEPHONE (OUTPATIENT)
Dept: PODIATRY | Facility: CLINIC | Age: 58
End: 2023-02-24
Payer: COMMERCIAL

## 2023-03-09 ENCOUNTER — OFFICE VISIT (OUTPATIENT)
Dept: FAMILY MEDICINE | Facility: CLINIC | Age: 58
End: 2023-03-09
Attending: FAMILY MEDICINE
Payer: COMMERCIAL

## 2023-03-09 ENCOUNTER — PATIENT MESSAGE (OUTPATIENT)
Dept: ADMINISTRATIVE | Facility: HOSPITAL | Age: 58
End: 2023-03-09
Payer: COMMERCIAL

## 2023-03-09 ENCOUNTER — LAB VISIT (OUTPATIENT)
Dept: LAB | Facility: HOSPITAL | Age: 58
End: 2023-03-09
Attending: FAMILY MEDICINE
Payer: COMMERCIAL

## 2023-03-09 VITALS
WEIGHT: 209.63 LBS | BODY MASS INDEX: 30.01 KG/M2 | HEIGHT: 70 IN | HEART RATE: 80 BPM | SYSTOLIC BLOOD PRESSURE: 131 MMHG | RESPIRATION RATE: 16 BRPM | DIASTOLIC BLOOD PRESSURE: 68 MMHG

## 2023-03-09 DIAGNOSIS — E11.65 TYPE 2 DIABETES MELLITUS WITH HYPERGLYCEMIA, WITHOUT LONG-TERM CURRENT USE OF INSULIN: ICD-10-CM

## 2023-03-09 DIAGNOSIS — I10 ESSENTIAL HYPERTENSION: ICD-10-CM

## 2023-03-09 DIAGNOSIS — E11.65 TYPE 2 DIABETES MELLITUS WITH HYPERGLYCEMIA, WITHOUT LONG-TERM CURRENT USE OF INSULIN: Primary | ICD-10-CM

## 2023-03-09 DIAGNOSIS — E78.2 MIXED HYPERLIPIDEMIA: ICD-10-CM

## 2023-03-09 LAB
ALBUMIN SERPL BCP-MCNC: 4.3 G/DL (ref 3.5–5.2)
ALBUMIN/CREAT UR: 31.9 UG/MG (ref 0–30)
ALP SERPL-CCNC: 87 U/L (ref 55–135)
ALT SERPL W/O P-5'-P-CCNC: 51 U/L (ref 10–44)
ANION GAP SERPL CALC-SCNC: 9 MMOL/L (ref 8–16)
AST SERPL-CCNC: 33 U/L (ref 10–40)
BILIRUB SERPL-MCNC: 0.5 MG/DL (ref 0.1–1)
BUN SERPL-MCNC: 10 MG/DL (ref 6–20)
CALCIUM SERPL-MCNC: 10 MG/DL (ref 8.7–10.5)
CHLORIDE SERPL-SCNC: 100 MMOL/L (ref 95–110)
CO2 SERPL-SCNC: 25 MMOL/L (ref 23–29)
CREAT SERPL-MCNC: 0.9 MG/DL (ref 0.5–1.4)
CREAT UR-MCNC: 113 MG/DL (ref 23–375)
EST. GFR  (NO RACE VARIABLE): >60 ML/MIN/1.73 M^2
ESTIMATED AVG GLUCOSE: 217 MG/DL (ref 68–131)
GLUCOSE SERPL-MCNC: 213 MG/DL (ref 70–110)
HBA1C MFR BLD: 9.2 % (ref 4–5.6)
MICROALBUMIN UR DL<=1MG/L-MCNC: 36 UG/ML
POTASSIUM SERPL-SCNC: 4.1 MMOL/L (ref 3.5–5.1)
PROT SERPL-MCNC: 7.5 G/DL (ref 6–8.4)
SODIUM SERPL-SCNC: 134 MMOL/L (ref 136–145)

## 2023-03-09 PROCEDURE — 3008F PR BODY MASS INDEX (BMI) DOCUMENTED: ICD-10-PCS | Mod: CPTII,S$GLB,, | Performed by: FAMILY MEDICINE

## 2023-03-09 PROCEDURE — 99214 OFFICE O/P EST MOD 30 MIN: CPT | Mod: S$GLB,,, | Performed by: FAMILY MEDICINE

## 2023-03-09 PROCEDURE — 99214 PR OFFICE/OUTPT VISIT, EST, LEVL IV, 30-39 MIN: ICD-10-PCS | Mod: S$GLB,,, | Performed by: FAMILY MEDICINE

## 2023-03-09 PROCEDURE — 99999 PR PBB SHADOW E&M-EST. PATIENT-LVL V: ICD-10-PCS | Mod: PBBFAC,,, | Performed by: FAMILY MEDICINE

## 2023-03-09 PROCEDURE — 3075F SYST BP GE 130 - 139MM HG: CPT | Mod: CPTII,S$GLB,, | Performed by: FAMILY MEDICINE

## 2023-03-09 PROCEDURE — 1160F PR REVIEW ALL MEDS BY PRESCRIBER/CLIN PHARMACIST DOCUMENTED: ICD-10-PCS | Mod: CPTII,S$GLB,, | Performed by: FAMILY MEDICINE

## 2023-03-09 PROCEDURE — 83036 HEMOGLOBIN GLYCOSYLATED A1C: CPT | Performed by: FAMILY MEDICINE

## 2023-03-09 PROCEDURE — 4010F ACE/ARB THERAPY RXD/TAKEN: CPT | Mod: CPTII,S$GLB,, | Performed by: FAMILY MEDICINE

## 2023-03-09 PROCEDURE — 82570 ASSAY OF URINE CREATININE: CPT | Performed by: FAMILY MEDICINE

## 2023-03-09 PROCEDURE — 3008F BODY MASS INDEX DOCD: CPT | Mod: CPTII,S$GLB,, | Performed by: FAMILY MEDICINE

## 2023-03-09 PROCEDURE — 3078F PR MOST RECENT DIASTOLIC BLOOD PRESSURE < 80 MM HG: ICD-10-PCS | Mod: CPTII,S$GLB,, | Performed by: FAMILY MEDICINE

## 2023-03-09 PROCEDURE — 3075F PR MOST RECENT SYSTOLIC BLOOD PRESS GE 130-139MM HG: ICD-10-PCS | Mod: CPTII,S$GLB,, | Performed by: FAMILY MEDICINE

## 2023-03-09 PROCEDURE — 36415 COLL VENOUS BLD VENIPUNCTURE: CPT | Mod: PO | Performed by: FAMILY MEDICINE

## 2023-03-09 PROCEDURE — 99999 PR PBB SHADOW E&M-EST. PATIENT-LVL V: CPT | Mod: PBBFAC,,, | Performed by: FAMILY MEDICINE

## 2023-03-09 PROCEDURE — 1159F PR MEDICATION LIST DOCUMENTED IN MEDICAL RECORD: ICD-10-PCS | Mod: CPTII,S$GLB,, | Performed by: FAMILY MEDICINE

## 2023-03-09 PROCEDURE — 80053 COMPREHEN METABOLIC PANEL: CPT | Performed by: FAMILY MEDICINE

## 2023-03-09 PROCEDURE — 4010F PR ACE/ARB THEARPY RXD/TAKEN: ICD-10-PCS | Mod: CPTII,S$GLB,, | Performed by: FAMILY MEDICINE

## 2023-03-09 PROCEDURE — 1160F RVW MEDS BY RX/DR IN RCRD: CPT | Mod: CPTII,S$GLB,, | Performed by: FAMILY MEDICINE

## 2023-03-09 PROCEDURE — 1159F MED LIST DOCD IN RCRD: CPT | Mod: CPTII,S$GLB,, | Performed by: FAMILY MEDICINE

## 2023-03-09 PROCEDURE — 3078F DIAST BP <80 MM HG: CPT | Mod: CPTII,S$GLB,, | Performed by: FAMILY MEDICINE

## 2023-03-09 NOTE — PATIENT INSTRUCTIONS
Wily,     We are always striving for excellence. Should you receive a patient experience survey via text message, electronically, or by mail, we would appreciate if you would take a few moments to give us your feedback. These surveys let us know our strengths as well as areas of opportunity for improvement to better serve you.    Thank you for your time,  Rebeca Daniel LPN      Your test results will be communicated to you via : [facility], Telephone or Letter.   If you have not received your test results in one week, please contact the clinic at [phone].

## 2023-03-09 NOTE — PROGRESS NOTES
"Subjective:       Patient ID: Wily Rodrigues is a 58 y.o. male.    Chief Complaint: Diabetes    Diabetes  Pertinent negatives for diabetes include no chest pain, no fatigue, no polydipsia and no polyuria.   Pt is here for follow up of dm stable on mounjaro metformin glipizde actos pt is feeling well on the meds  Pt has htn bp fine today on repeat with relaxation no sob/cp no cough on ace   Pt has hypercholesterolemia on statin no muscle aches   Review of Systems   Constitutional:  Negative for chills, fatigue and fever.   Respiratory:  Negative for cough, chest tightness and shortness of breath.    Cardiovascular:  Negative for chest pain and palpitations.   Gastrointestinal:  Negative for abdominal distention, abdominal pain and blood in stool.   Endocrine: Negative for polydipsia and polyuria.     Objective:    /68   Pulse 80   Resp 16   Ht 5' 10" (1.778 m)   Wt 95.1 kg (209 lb 9.6 oz)   BMI 30.07 kg/m²     Physical Exam  Constitutional:       Appearance: Normal appearance. He is not ill-appearing.   Cardiovascular:      Rate and Rhythm: Normal rate and regular rhythm.      Heart sounds:     No gallop.   Pulmonary:      Effort: Pulmonary effort is normal. No respiratory distress.      Breath sounds: Normal breath sounds.   Neurological:      General: No focal deficit present.      Mental Status: He is alert and oriented to person, place, and time.      Cranial Nerves: No cranial nerve deficit.      Coordination: Coordination normal.     Hgb a1c 9.9 in 12/2022  Assessment:       1. Type 2 diabetes mellitus with hyperglycemia, without long-term current use of insulin    2. Essential hypertension    3. Mixed hyperlipidemia          Plan:     Orders cmp lipid hgb a1c  Cont meds  Ada diet  Graded exercise  Rtc quarterly        "This note will not be shared with the patient."     "

## 2023-04-12 ENCOUNTER — PATIENT MESSAGE (OUTPATIENT)
Dept: ADMINISTRATIVE | Facility: HOSPITAL | Age: 58
End: 2023-04-12
Payer: COMMERCIAL

## 2023-04-13 ENCOUNTER — TELEPHONE (OUTPATIENT)
Dept: FAMILY MEDICINE | Facility: CLINIC | Age: 58
End: 2023-04-13
Payer: COMMERCIAL

## 2023-04-13 NOTE — TELEPHONE ENCOUNTER
"----- Message from Lana Bautistain sent at 4/13/2023  1:20 PM CDT -----  Regarding: Patient Advice                  Name of Who is Calling: Wily Rodrigues    Who Left The Message: Wily Rodrigues      What is the request in detail:  Patient called stating, "he's tried all over the city and there's no Mounjaro Insulin anywhere." Please give a call back at your earliest convenience and further advise. Thank you      Reply by [facility]: NO      Preferred Call Back :  [phone] ([phone]                                           "

## 2023-04-13 NOTE — TELEPHONE ENCOUNTER
Pt states that he can't find any Mounjaro no where please help if you who has it in stock.    Thanks, Ty

## 2023-04-15 ENCOUNTER — PATIENT MESSAGE (OUTPATIENT)
Dept: FAMILY MEDICINE | Facility: CLINIC | Age: 58
End: 2023-04-15
Payer: COMMERCIAL

## 2023-04-15 DIAGNOSIS — E11.65 TYPE 2 DIABETES MELLITUS WITH HYPERGLYCEMIA, WITHOUT LONG-TERM CURRENT USE OF INSULIN: ICD-10-CM

## 2023-04-18 DIAGNOSIS — E66.9 DIABETES MELLITUS TYPE 2 IN OBESE: Primary | ICD-10-CM

## 2023-04-18 DIAGNOSIS — E11.69 DIABETES MELLITUS TYPE 2 IN OBESE: Primary | ICD-10-CM

## 2023-04-18 RX ORDER — TIRZEPATIDE 5 MG/.5ML
5 INJECTION, SOLUTION SUBCUTANEOUS
Qty: 4 PEN | Refills: 11 | Status: SHIPPED | OUTPATIENT
Start: 2023-04-18

## 2023-04-20 ENCOUNTER — PATIENT OUTREACH (OUTPATIENT)
Dept: ADMINISTRATIVE | Facility: HOSPITAL | Age: 58
End: 2023-04-20
Payer: COMMERCIAL

## 2023-04-28 ENCOUNTER — PATIENT MESSAGE (OUTPATIENT)
Dept: ADMINISTRATIVE | Facility: HOSPITAL | Age: 58
End: 2023-04-28
Payer: COMMERCIAL

## 2023-04-28 ENCOUNTER — PATIENT OUTREACH (OUTPATIENT)
Dept: ADMINISTRATIVE | Facility: HOSPITAL | Age: 58
End: 2023-04-28
Payer: COMMERCIAL

## 2023-04-28 DIAGNOSIS — E11.65 TYPE 2 DIABETES MELLITUS WITH HYPERGLYCEMIA, WITHOUT LONG-TERM CURRENT USE OF INSULIN: Primary | ICD-10-CM

## 2023-05-14 ENCOUNTER — PATIENT MESSAGE (OUTPATIENT)
Dept: FAMILY MEDICINE | Facility: CLINIC | Age: 58
End: 2023-05-14
Payer: COMMERCIAL

## 2023-05-14 ENCOUNTER — OFFICE VISIT (OUTPATIENT)
Dept: URGENT CARE | Facility: CLINIC | Age: 58
End: 2023-05-14
Payer: COMMERCIAL

## 2023-05-14 VITALS
DIASTOLIC BLOOD PRESSURE: 70 MMHG | TEMPERATURE: 99 F | BODY MASS INDEX: 29.92 KG/M2 | SYSTOLIC BLOOD PRESSURE: 121 MMHG | RESPIRATION RATE: 17 BRPM | WEIGHT: 209 LBS | HEART RATE: 98 BPM | HEIGHT: 70 IN | OXYGEN SATURATION: 97 %

## 2023-05-14 DIAGNOSIS — R05.9 COUGH, UNSPECIFIED TYPE: ICD-10-CM

## 2023-05-14 DIAGNOSIS — J44.1 COPD EXACERBATION: Primary | ICD-10-CM

## 2023-05-14 LAB
CTP QC/QA: YES
SARS-COV-2 AG RESP QL IA.RAPID: NEGATIVE

## 2023-05-14 PROCEDURE — 99213 OFFICE O/P EST LOW 20 MIN: CPT | Mod: S$GLB,,, | Performed by: NURSE PRACTITIONER

## 2023-05-14 PROCEDURE — 87811 SARS CORONAVIRUS 2 ANTIGEN POCT, MANUAL READ: ICD-10-PCS | Mod: QW,S$GLB,, | Performed by: NURSE PRACTITIONER

## 2023-05-14 PROCEDURE — 99213 PR OFFICE/OUTPT VISIT, EST, LEVL III, 20-29 MIN: ICD-10-PCS | Mod: S$GLB,,, | Performed by: NURSE PRACTITIONER

## 2023-05-14 PROCEDURE — 87811 SARS-COV-2 COVID19 W/OPTIC: CPT | Mod: QW,S$GLB,, | Performed by: NURSE PRACTITIONER

## 2023-05-14 RX ORDER — BENZONATATE 100 MG/1
100 CAPSULE ORAL 3 TIMES DAILY PRN
Qty: 30 CAPSULE | Refills: 0 | Status: SHIPPED | OUTPATIENT
Start: 2023-05-14 | End: 2023-05-24

## 2023-05-14 RX ORDER — FLUTICASONE PROPIONATE 50 MCG
2 SPRAY, SUSPENSION (ML) NASAL DAILY
Qty: 11.1 ML | Refills: 0 | Status: SHIPPED | OUTPATIENT
Start: 2023-05-14 | End: 2023-06-13

## 2023-05-14 RX ORDER — ALBUTEROL SULFATE 90 UG/1
2 AEROSOL, METERED RESPIRATORY (INHALATION) EVERY 6 HOURS PRN
Qty: 18 G | Refills: 0 | Status: SHIPPED | OUTPATIENT
Start: 2023-05-14 | End: 2023-06-13

## 2023-05-14 RX ORDER — PREDNISONE 20 MG/1
40 TABLET ORAL DAILY
Qty: 10 TABLET | Refills: 0 | Status: SHIPPED | OUTPATIENT
Start: 2023-05-14 | End: 2023-05-19

## 2023-05-14 RX ORDER — CETIRIZINE HYDROCHLORIDE 10 MG/1
10 TABLET ORAL DAILY
Qty: 30 TABLET | Refills: 0 | Status: SHIPPED | OUTPATIENT
Start: 2023-05-14 | End: 2023-06-13

## 2023-05-14 NOTE — PROGRESS NOTES
"Subjective:      Patient ID: Wily Rodrigues is a 58 y.o. male.    Vitals:  height is 5' 10" (1.778 m) and weight is 94.8 kg (209 lb). His temperature is 99.3 °F (37.4 °C). His blood pressure is 121/70 and his pulse is 98. His respiration is 17 and oxygen saturation is 97%.     Chief Complaint: Cough    59yo male pt with HX of COPD reports dry cough, chest congestion, weakness, and mild sore throat that started yesterday.  Reports that he often experiences similar symptoms when he is active outside during high heat index, reports playing volleyball yesterday before symptom onset.  Reports intermittent chest tightness and mild SOB.  Denies using any preventative medications for COPD, reports that he is currently out of his antihistamines, nasal spray, and albuterol rescue inhaler.  Denies fever/chills, denies n/v/d, denies abd pain.  Denies chest pain.  Denies known sick contacts.  Reports receiving both COVID and flu vaccinations.    Cough  This is a new problem. The current episode started yesterday. The problem has been gradually worsening. The cough is Non-productive. Associated symptoms include postnasal drip, a sore throat and wheezing. Pertinent negatives include no chest pain, chills, ear pain, fever, headaches or shortness of breath. Treatments tried: MUCINEX, ADVIL. The treatment provided mild relief. His past medical history is significant for asthma.     Constitution: Positive for sweating. Negative for chills, fatigue and fever.   HENT:  Positive for congestion, postnasal drip and sore throat. Negative for ear pain and trouble swallowing.    Cardiovascular:  Negative for chest pain, leg swelling, palpitations and sob on exertion.   Respiratory:  Positive for chest tightness, cough, COPD and wheezing. Negative for shortness of breath.    Gastrointestinal:  Negative for abdominal pain, nausea, vomiting and diarrhea.   Neurological:  Negative for headaches.    Objective:     Physical Exam "   Constitutional: He is oriented to person, place, and time. He appears well-developed. He is cooperative.  Non-toxic appearance. He does not appear ill. No distress.   HENT:   Head: Normocephalic and atraumatic.   Ears:   Right Ear: Hearing, external ear and ear canal normal. Tympanic membrane is bulging. Tympanic membrane is not erythematous and not retracted. A middle ear effusion (clear fluid) is present.   Left Ear: Hearing, external ear and ear canal normal. Tympanic membrane is bulging. Tympanic membrane is not erythematous and not retracted. A middle ear effusion (clear fluid) is present.   Nose: Mucosal edema (erythema to BL turbinates) and rhinorrhea (clear to BL nares) present. No purulent discharge or nasal deformity. No epistaxis. Right sinus exhibits no maxillary sinus tenderness and no frontal sinus tenderness. Left sinus exhibits no maxillary sinus tenderness and no frontal sinus tenderness.   Mouth/Throat: Uvula is midline and mucous membranes are normal. No trismus in the jaw. Normal dentition. No uvula swelling. Oropharyngeal exudate (clear postnasal drip), posterior oropharyngeal erythema (mild) and cobblestoning present. No posterior oropharyngeal edema. Tonsils are 1+ on the right. Tonsils are 1+ on the left. No tonsillar exudate.   Eyes: Conjunctivae and lids are normal. No scleral icterus.   Neck: Trachea normal and phonation normal. Neck supple. No edema present. No erythema present. No neck rigidity present.   Cardiovascular: Normal rate, regular rhythm, normal heart sounds and normal pulses.   Pulmonary/Chest: Effort normal and breath sounds normal. No accessory muscle usage or stridor. No tachypnea. No respiratory distress. He has no decreased breath sounds. He has no wheezes. He has no rhonchi. He has no rales.   Dry cough with mild bronchospasm noted during exam.         Comments: Dry cough with mild bronchospasm noted during exam.    Abdominal: Normal appearance.   Musculoskeletal:  Normal range of motion.         General: No deformity. Normal range of motion.   Lymphadenopathy:        Head (right side): No submandibular adenopathy present.        Head (left side): No submandibular adenopathy present.     He has no cervical adenopathy.   Neurological: He is alert and oriented to person, place, and time. He exhibits normal muscle tone. Coordination normal.   Skin: Skin is warm, dry, intact, not diaphoretic and not pale.   Psychiatric: His speech is normal and behavior is normal. Judgment and thought content normal.   Nursing note and vitals reviewed.    Results for orders placed or performed in visit on 05/14/23   SARS Coronavirus 2 Antigen, POCT Manual Read   Result Value Ref Range    SARS Coronavirus 2 Antigen Negative Negative     Acceptable Yes          Assessment:     1. COPD exacerbation    2. Cough, unspecified type        Plan:     Provided education on prescribed medications, handwritten prescription provided for guaifenesin-codeine cough syrup (10mL q6h PRN cough, 120mL total dispensed) due to printer malfunction at clinic.  Offered levalbuterol nebulizer treatment, pt refused.      Recommended discussing symptoms with PCP for possible nebulizer at home and preventative medications for COPD.  Provided education on return/ER precautions, urged immediate ER visit if symptoms worsen during treatment, raghav SOB.  Pt verbalized understanding and agreed to plan.      COPD exacerbation  -     predniSONE (DELTASONE) 20 MG tablet; Take 2 tablets (40 mg total) by mouth once daily. for 5 days  Dispense: 10 tablet; Refill: 0  -     cetirizine (ZYRTEC) 10 MG tablet; Take 1 tablet (10 mg total) by mouth once daily.  Dispense: 30 tablet; Refill: 0  -     fluticasone propionate (FLONASE) 50 mcg/actuation nasal spray; 2 sprays (100 mcg total) by Each Nostril route once daily.  Dispense: 11.1 mL; Refill: 0  -     albuterol (VENTOLIN HFA) 90 mcg/actuation inhaler; Inhale 2 puffs into the  "lungs every 6 (six) hours as needed for Wheezing or Shortness of Breath. Rescue  Dispense: 18 g; Refill: 0    Cough, unspecified type  -     SARS Coronavirus 2 Antigen, POCT Manual Read  -     benzonatate (TESSALON) 100 MG capsule; Take 1 capsule (100 mg total) by mouth 3 (three) times daily as needed for Cough.  Dispense: 30 capsule; Refill: 0      Patient Instructions   If your condition worsens or fails to improve, we recommend that you receive another evaluation at the ER immediately contact your PCP to discuss your concerns, or return here.  You must understand that you've received an urgent care treatment only, and that you may be released before all your medical problems are known or treated.  You, the patient, will arrange for follow-up care as instructed.     If we discussed that I think your illness is viral, it will not respond to antibiotics and will last 10-14 days.  If we discussed "wait and see" antibiotics, and if over the next few days the symptoms worsen, start the antibiotics I have given you.     If you are female and on birth control pills and do take the antibiotics, use additional methods to prevent pregnancy while on the antibiotics and for one cycle after.     Flonase (fluticasone) is a nasal spray which is available over the counter and may help with your symptoms.  Zyrtec D, Claritin D, or Allegra D can also help with symptoms of congestion and drainage.  If you have hypertension, avoid using the "D" which is the decongestant formula.    If you just have drainage, you can take plain Zyrtec, Claritin, or Allegra.  If you just have a congested feeling, you can take pseudoephedrine (unless you have high blood pressure), which you have to sign for behind the counter.  Do not buy phenylephrine OTC, as it is not effective.    Rest and fluids are also important.  Tylenol or ibuprofen can also be used as directed for pain, unless you have an allergy to them or medical condition (such as stomach " ulcers, kidney or liver disease, or use blood thinners, etc.) for which you should not be taking these type of medications.     If you are flying in the next few days, Afrin nose drops for the airplane flight upon take off and landing may help.  Other than at those times, refrain from using Afrin.     If you were prescribed a narcotic or sedating cough medicine, do not drive or operate heavy machinery while taking these medications.                Additional MDM:     Heart Failure Score:   COPD = Yes

## 2023-05-16 DIAGNOSIS — G47.33 OBSTRUCTIVE SLEEP APNEA: Primary | ICD-10-CM

## 2023-05-16 DIAGNOSIS — Z87.09 HISTORY OF ASTHMA: ICD-10-CM

## 2023-05-18 ENCOUNTER — TELEPHONE (OUTPATIENT)
Dept: ORTHOPEDICS | Facility: CLINIC | Age: 58
End: 2023-05-18
Payer: COMMERCIAL

## 2023-05-26 ENCOUNTER — OFFICE VISIT (OUTPATIENT)
Dept: URGENT CARE | Facility: CLINIC | Age: 58
End: 2023-05-26
Payer: COMMERCIAL

## 2023-05-26 VITALS
TEMPERATURE: 98 F | RESPIRATION RATE: 17 BRPM | HEIGHT: 70 IN | WEIGHT: 209 LBS | OXYGEN SATURATION: 99 % | DIASTOLIC BLOOD PRESSURE: 78 MMHG | SYSTOLIC BLOOD PRESSURE: 130 MMHG | HEART RATE: 99 BPM | BODY MASS INDEX: 29.92 KG/M2

## 2023-05-26 DIAGNOSIS — K30 INDIGESTION: ICD-10-CM

## 2023-05-26 DIAGNOSIS — H61.22 IMPACTED CERUMEN OF LEFT EAR: ICD-10-CM

## 2023-05-26 DIAGNOSIS — R05.9 COUGH, UNSPECIFIED TYPE: Primary | ICD-10-CM

## 2023-05-26 PROCEDURE — 71046 XR CHEST PA AND LATERAL: ICD-10-PCS | Mod: S$GLB,,, | Performed by: RADIOLOGY

## 2023-05-26 PROCEDURE — 99214 PR OFFICE/OUTPT VISIT, EST, LEVL IV, 30-39 MIN: ICD-10-PCS | Mod: 25,S$GLB,,

## 2023-05-26 PROCEDURE — 99214 OFFICE O/P EST MOD 30 MIN: CPT | Mod: 25,S$GLB,,

## 2023-05-26 PROCEDURE — 69210 EAR CERUMEN REMOVAL: ICD-10-PCS | Mod: S$GLB,,,

## 2023-05-26 PROCEDURE — 69210 REMOVE IMPACTED EAR WAX UNI: CPT | Mod: S$GLB,,,

## 2023-05-26 PROCEDURE — 71046 X-RAY EXAM CHEST 2 VIEWS: CPT | Mod: S$GLB,,, | Performed by: RADIOLOGY

## 2023-05-26 RX ORDER — MAG HYDROX/ALUMINUM HYD/SIMETH 200-200-20
30 SUSPENSION, ORAL (FINAL DOSE FORM) ORAL
Status: COMPLETED | OUTPATIENT
Start: 2023-05-26 | End: 2023-05-26

## 2023-05-26 RX ORDER — LIDOCAINE HYDROCHLORIDE 20 MG/ML
10 SOLUTION OROPHARYNGEAL
Status: COMPLETED | OUTPATIENT
Start: 2023-05-26 | End: 2023-05-26

## 2023-05-26 RX ORDER — DICYCLOMINE HYDROCHLORIDE 10 MG/5ML
20 SOLUTION ORAL
Status: COMPLETED | OUTPATIENT
Start: 2023-05-26 | End: 2023-05-26

## 2023-05-26 RX ORDER — METHYLPHENIDATE HYDROCHLORIDE 54 MG/1
54 TABLET, EXTENDED RELEASE ORAL EVERY MORNING
COMMUNITY
Start: 2023-05-18 | End: 2024-02-28 | Stop reason: DRUGHIGH

## 2023-05-26 RX ADMIN — LIDOCAINE HYDROCHLORIDE 10 ML: 20 SOLUTION OROPHARYNGEAL at 02:05

## 2023-05-26 RX ADMIN — DICYCLOMINE HYDROCHLORIDE 20 MG: 10 SOLUTION ORAL at 02:05

## 2023-05-26 RX ADMIN — Medication 30 ML: at 02:05

## 2023-05-26 NOTE — PATIENT INSTRUCTIONS
No pneumonia seen on your x-ray today.     Continue albuterol inhaler for chest tightness/shortness of breath/wheezing/coughing fits as directed. I included information in your discharge paperwork about this medication for you to review. Check glucose at home and eat a well balanced diet to avoid elevated sugar levels.     Continue tessalon perles as directed during the day for your cough. It will help numb the back of your throat so you do not have the urge to cough.      Try benadryl or nyquil before bedtime for cough. This will make you drowsy. Make sure not to drive, drink alcohol, operate machinery or take other sedating medications while taking this.      Try a decongestant and corticosteroid nasal spray like flonase for the next few days for sinus relief. Initial: 2 sprays in each nostril once daily for 1 week. Reduce to 1 spray in each nostril once per day. Stop taking if you develop a nose bleed. Nasal saline spray can be used together with flonase to help moisten nostrils.     An antihistamine like zyrtec, claritin, allegra can also be helpful for sinus relief and will help dry nasal passages.  Neti pot irrigation, humidifier in your room, avoiding fans, warm compresses to face, eating/drinking hot soups, hot shower before bedtime can help.     Regular (Guaifenesin) Mucinex 1200 mg twice per day for 10 days can help thin secretions for better clearance. Drink plenty of fluids with this.     Honey is a natural cough suppressant. Warm tea/warm liquids will help soothe the back of your throat. Warm water salt gurgles can also be helpful. A dry throat will cause pain. Make sure to stay hydrated. Water and pedilyte are the best to drink. It is recommended that men drink 3.7 liters of water per day or seven 16 ounce water bottles per day. For women, 2.7 liters of water or five 16 ounce bottles daily.     Alternate Tylenol and ibuprofen every 4 hours as needed for fever and body aches.  Please take NSAIDs with a  full glass of water and food to avoid GI upset.      Over the counter debrox drops can help aid in ear wax clearance.  Allow warm water from a shower into your ear then go lie down on either side to allow ear wax to drain naturally from your ear. Avoid using q-tips as this will push ear wax further into your ear canal and cause obstruction.      Please only use over the counter cough and cold medications for 3-5 days at a time to avoid rebound symptoms.     Getting plenty of rest can aid in a faster recovery of illnesses.     Avoid OTC cough and cold medications that can raise your BP such as dextromethorphan, pseudoephedrine, phenylephrine, naphazoline and oxymetazoline. Try DASH diet and buy a blood pressure cuff for home monitoring. Check blood pressure at least 2 times per day and create a log. Avoid eating foods that are high in salt. Eat more foods with potassium, magnesium and calcium which will help dilate your vessels and decrease your BP.     Cover mouth and nose when sneezing and coughing. Wash hands frequently. Sanitize areas at home. Wear a mask in public if you need. Stay home if you are having fevers, chills, body aches, fatigue. Symptoms should resolve in 7-14 days. There is no specific treatment for viral illnesses. We treat symptoms with supportive care. Getting plenty of rest but completing light activities daily, eating a well-balanced diet, drinking plenty of fluids, managing stress levels can aid in faster recovery.      Please follow-up with your primary care provider or return to the clinic if not better/worsening symptoms in 1 week.     Report to the ER if you have chest pain, shortness of breath, palpitations.

## 2023-05-26 NOTE — PROCEDURES
Ear Cerumen Removal    Date/Time: 5/26/2023 12:30 PM  Performed by: Lucille San NP  Authorized by: Lucille San NP     Consent Done?:  Yes (Verbal)    Local anesthetic:  None  Ceruminolytic: colace.  Location details:  Both ears  Procedure type: curette and irrigation    Cerumen  Removal Results:  Cerumen partially removed  Patient tolerance:  Patient tolerated the procedure well with no immediate complications

## 2023-05-26 NOTE — PROGRESS NOTES
"Subjective:      Patient ID: Wily Rodrigues is a 58 y.o. male.    Vitals:  height is 5' 10" (1.778 m) and weight is 94.8 kg (209 lb). His oral temperature is 98.4 °F (36.9 °C). His blood pressure is 130/78 and his pulse is 99. His respiration is 17 and oxygen saturation is 99%.     Chief Complaint: Wheezing    Past Medical History:  No date: Allergy  No date: Asthma  No date: Diabetes mellitus  No date: Hyperlipidemia  No date: Hypertension  No date: Lung disease  No date: Sleep apnea  No date: Thyroid disease      Comment:  hypothyroidism    Provider's note begins below:  Pt was here on 5/14 for COPD exacerbation. He was given albuterol inhaler, prednisone x 5 days, zyrtec, flonase and benzonatate. He reports symptoms did improve. He reports symptoms worsened last night with wheezing, SOB, dry cough, chills, fatigue. He biked 1 mile last night and ate a heavy meal last night. He had diarrhea yesterday. He feels dehydrated. He has been having indigestion with belching. Currently taking Mounjaro. No chest pain or fevers.     Wheezing   This is a recurrent problem. The current episode started 1 to 4 weeks ago. The problem occurs constantly. The problem has been unchanged. Associated symptoms include chills, coughing, diarrhea, ear pain, headaches and shortness of breath. Pertinent negatives include no abdominal pain, chest pain, coryza, fever, hemoptysis, neck pain, rash, rhinorrhea, sore throat, sputum production, swollen glands or vomiting. The symptoms are aggravated by weather changes. He has tried oral steroids, prescription cough suppressant and beta agonist inhalers (Advil,inhaler,Flonase) for the symptoms. The treatment provided mild relief. His past medical history is significant for COPD.     Constitution: Positive for chills. Negative for fever.   HENT:  Positive for ear pain. Negative for sore throat.    Neck: Negative for neck pain.   Cardiovascular:  Negative for chest pain.   Respiratory:  Positive " for cough, shortness of breath and wheezing. Negative for sputum production and bloody sputum.    Gastrointestinal:  Positive for diarrhea. Negative for abdominal pain and vomiting.   Skin:  Negative for rash.   Neurological:  Positive for headaches.    Objective:     Physical Exam   Constitutional: He is oriented to person, place, and time. He appears well-developed. He is cooperative.  Non-toxic appearance. He does not appear ill. No distress.   HENT:   Head: Normocephalic and atraumatic.   Ears:   Right Ear: Hearing, tympanic membrane, external ear and ear canal normal. impacted cerumen  Left Ear: Hearing, external ear and ear canal normal. impacted cerumen  Nose: Nose normal. No mucosal edema, rhinorrhea, nasal deformity or congestion. No epistaxis. Right sinus exhibits no maxillary sinus tenderness and no frontal sinus tenderness. Left sinus exhibits no maxillary sinus tenderness and no frontal sinus tenderness.   Mouth/Throat: Uvula is midline, oropharynx is clear and moist and mucous membranes are normal. No trismus in the jaw. Normal dentition. No uvula swelling. No oropharyngeal exudate, posterior oropharyngeal edema or posterior oropharyngeal erythema.   Eyes: Conjunctivae and lids are normal. Pupils are equal, round, and reactive to light. No scleral icterus.   Neck: Trachea normal and phonation normal. Neck supple. No edema present. No erythema present. No neck rigidity present.   Cardiovascular: Normal rate, regular rhythm, normal heart sounds and normal pulses.   Pulmonary/Chest: Effort normal and breath sounds normal. No stridor. No respiratory distress. He has no decreased breath sounds. He has no wheezes. He has no rhonchi. He has no rales.   Abdominal: Normal appearance. Soft. There is no abdominal tenderness. There is no rebound and no guarding.   Musculoskeletal: Normal range of motion.         General: No deformity. Normal range of motion.   Lymphadenopathy:     He has no cervical adenopathy.    Neurological: He is alert and oriented to person, place, and time. He exhibits normal muscle tone. Coordination normal.   Skin: Skin is warm, dry, intact, not diaphoretic and not pale. Capillary refill takes less than 2 seconds.   Psychiatric: His speech is normal and behavior is normal. Judgment and thought content normal.   Nursing note and vitals reviewed.    Assessment:     1. Cough, unspecified type    2. Indigestion    3. Impacted cerumen of left ear        Plan:     EXAMINATION:  XR CHEST PA AND LATERAL     TECHNIQUE:  Two views of the chest were obtained, with PA and lateral projections submitted.     COMPARISON:  Comparison is made to 04/16/2012.  Clinical information obtained from the electronic medical record indicates cough, fever, chills and wheezing.     FINDINGS:  Heart size is normal, as is the appearance of the pulmonary vascularity.  Lung zones are clear, and are free of significant airspace consolidation or volume loss.  No pleural fluid.  No hilar or mediastinal mass lesion.  No pneumothorax or pneumomediastinum.     Impression:     No significant intrathoracic abnormality.  No significant detrimental interval change in the appearance of the chest since 04/16/2012 is appreciated.        Electronically signed by: Dong Fay MD  Date:                                            05/26/2023  Time:                                           14:22    Ear Cerumen Removal    Date/Time: 5/26/2023 12:30 PM  Performed by: Lucille San NP  Authorized by: Lucille San NP     Consent Done?:  Yes (Verbal)    Local anesthetic:  None  Ceruminolytic: colace.  Location details:  Both ears  Procedure type: curette and irrigation    Cerumen  Removal Results:  Cerumen partially removed  Patient tolerance:  Patient tolerated the procedure well with no immediate complications    Cough, unspecified type  -     XR CHEST PA AND LATERAL; Future; Expected date: 05/26/2023    Indigestion  -     aluminum-magnesium  hydroxide-simethicone 200-200-20 mg/5 mL suspension 30 mL  -     dicyclomine 10 mg/5 mL syrup 20 mg  -     LIDOcaine HCl 2% oral solution 10 mL    Impacted cerumen of left ear  -     Ear Cerumen Removal      Medical Decision Making:   History:   Old Records Summarized: records from clinic visits.  Differential Diagnosis:   Pneumonia, COPD exacerbation, bronchitis, GERD  Clinical Tests:   Radiological Study: Ordered and Reviewed  Urgent Care Management:  Chest x-ray completed without pneumonia present, GI cocktail given with relief, cerumen removal bilaterally. Oxygen sat 99% and no tachypnea present at time of exam. Lungs are clear to auscultation today. Pt requested cough syrup with codeine. Pt was prescribed this three times in the past 6 months. I advised nyquil/benadryl & his already prescribed benzonatate during the day. GERD teaching given. Advised to follow up with PCP and pulmonogy.        30 minutes spent on patient's encounter. This includes face to face time and non-face to face time preparing to see the patient (eg, review of tests), obtaining and/or reviewing separately obtained history, documenting clinical information in the electronic or other health record, independently interpreting results and communicating results to the patient/family/caregiver, or care coordinator.     Discussed results/diagnosis/plan with patient in clinic. Strict precautions given to patient to monitor for worsening signs and symptoms. Advised to follow up with PCP or specialist.  Explained side effects of medications prescribed with patient and informed him/her to discontinue use if he/she has any side effects and to inform UC or PCP if this occurs. All questions answered. Strict ED verses clinic return precautions stressed and given in depth. Advised if symptoms worsens of fail to improve he/she should go to the Emergency Room. Discharge and follow-up instructions given verbally/printed with the patient who expressed  understanding and willingness to comply with my recommendations. Patient voiced understanding and in agreement with current treatment plan. Patient exits the exam room in no acute distress. Conversant and engaged during discharge discussion, verbalized understanding.

## 2023-05-31 DIAGNOSIS — I10 ESSENTIAL HYPERTENSION: ICD-10-CM

## 2023-05-31 DIAGNOSIS — E66.9 DIABETES MELLITUS TYPE 2 IN OBESE: Primary | ICD-10-CM

## 2023-05-31 DIAGNOSIS — E11.69 DIABETES MELLITUS TYPE 2 IN OBESE: Primary | ICD-10-CM

## 2023-06-02 DIAGNOSIS — E11.9 TYPE 2 DIABETES MELLITUS WITHOUT COMPLICATION, UNSPECIFIED WHETHER LONG TERM INSULIN USE: ICD-10-CM

## 2023-06-09 ENCOUNTER — PATIENT OUTREACH (OUTPATIENT)
Dept: ADMINISTRATIVE | Facility: HOSPITAL | Age: 58
End: 2023-06-09
Payer: COMMERCIAL

## 2023-06-09 NOTE — PROGRESS NOTES
[facility] a1c gap report. Upon reviewing chart. Mr. Rodrigues is schedule to complete a1c on 6/23/2023

## 2023-06-14 ENCOUNTER — PATIENT OUTREACH (OUTPATIENT)
Dept: ADMINISTRATIVE | Facility: HOSPITAL | Age: 58
End: 2023-06-14
Payer: COMMERCIAL

## 2023-06-14 ENCOUNTER — PATIENT MESSAGE (OUTPATIENT)
Dept: ADMINISTRATIVE | Facility: HOSPITAL | Age: 58
End: 2023-06-14
Payer: COMMERCIAL

## 2023-06-19 ENCOUNTER — TELEPHONE (OUTPATIENT)
Dept: PULMONOLOGY | Facility: CLINIC | Age: 58
End: 2023-06-19
Payer: COMMERCIAL

## 2023-06-19 DIAGNOSIS — J44.9 CHRONIC OBSTRUCTIVE PULMONARY DISEASE, UNSPECIFIED COPD TYPE: Primary | ICD-10-CM

## 2023-06-19 NOTE — TELEPHONE ENCOUNTER
Attempted to reach patient to inform him that Dr. Roach does not treat sleep apnea. His appointment needs to be rescheduled.

## 2023-06-20 ENCOUNTER — TELEPHONE (OUTPATIENT)
Dept: PULMONOLOGY | Facility: CLINIC | Age: 58
End: 2023-06-20
Payer: COMMERCIAL

## 2023-06-20 ENCOUNTER — PATIENT MESSAGE (OUTPATIENT)
Dept: PULMONOLOGY | Facility: CLINIC | Age: 58
End: 2023-06-20
Payer: COMMERCIAL

## 2023-06-20 NOTE — TELEPHONE ENCOUNTER
Attempted to reach patient to let him know that his appointment on 6/26 needs to be rescheduled. No answer message left. Will send a [facility] message.

## 2023-06-23 ENCOUNTER — LAB VISIT (OUTPATIENT)
Dept: LAB | Facility: HOSPITAL | Age: 58
End: 2023-06-23
Attending: FAMILY MEDICINE
Payer: COMMERCIAL

## 2023-06-23 DIAGNOSIS — E11.69 DIABETES MELLITUS TYPE 2 IN OBESE: ICD-10-CM

## 2023-06-23 DIAGNOSIS — E11.65 TYPE 2 DIABETES MELLITUS WITH HYPERGLYCEMIA, WITHOUT LONG-TERM CURRENT USE OF INSULIN: ICD-10-CM

## 2023-06-23 DIAGNOSIS — E66.9 DIABETES MELLITUS TYPE 2 IN OBESE: ICD-10-CM

## 2023-06-23 LAB
ALBUMIN SERPL BCP-MCNC: 4 G/DL (ref 3.5–5.2)
ALP SERPL-CCNC: 83 U/L (ref 55–135)
ALT SERPL W/O P-5'-P-CCNC: 35 U/L (ref 10–44)
ANION GAP SERPL CALC-SCNC: 11 MMOL/L (ref 8–16)
AST SERPL-CCNC: 25 U/L (ref 10–40)
BILIRUB SERPL-MCNC: 0.3 MG/DL (ref 0.1–1)
BUN SERPL-MCNC: 18 MG/DL (ref 6–20)
CALCIUM SERPL-MCNC: 9.7 MG/DL (ref 8.7–10.5)
CHLORIDE SERPL-SCNC: 104 MMOL/L (ref 95–110)
CHOLEST SERPL-MCNC: 223 MG/DL (ref 120–199)
CHOLEST/HDLC SERPL: 5.1 {RATIO} (ref 2–5)
CO2 SERPL-SCNC: 26 MMOL/L (ref 23–29)
CREAT SERPL-MCNC: 0.9 MG/DL (ref 0.5–1.4)
EST. GFR  (NO RACE VARIABLE): >60 ML/MIN/1.73 M^2
ESTIMATED AVG GLUCOSE: 140 MG/DL (ref 68–131)
ESTIMATED AVG GLUCOSE: 140 MG/DL (ref 68–131)
GLUCOSE SERPL-MCNC: 140 MG/DL (ref 70–110)
HBA1C MFR BLD: 6.5 % (ref 4–5.6)
HBA1C MFR BLD: 6.5 % (ref 4–5.6)
HDLC SERPL-MCNC: 44 MG/DL (ref 40–75)
HDLC SERPL: 19.7 % (ref 20–50)
LDLC SERPL CALC-MCNC: 141.8 MG/DL (ref 63–159)
NONHDLC SERPL-MCNC: 179 MG/DL
POTASSIUM SERPL-SCNC: 4.4 MMOL/L (ref 3.5–5.1)
PROT SERPL-MCNC: 6.9 G/DL (ref 6–8.4)
SODIUM SERPL-SCNC: 141 MMOL/L (ref 136–145)
TRIGL SERPL-MCNC: 186 MG/DL (ref 30–150)

## 2023-06-23 PROCEDURE — 36415 COLL VENOUS BLD VENIPUNCTURE: CPT | Mod: PO | Performed by: FAMILY MEDICINE

## 2023-06-23 PROCEDURE — 80053 COMPREHEN METABOLIC PANEL: CPT | Performed by: FAMILY MEDICINE

## 2023-06-23 PROCEDURE — 83036 HEMOGLOBIN GLYCOSYLATED A1C: CPT | Performed by: FAMILY MEDICINE

## 2023-06-23 PROCEDURE — 80061 LIPID PANEL: CPT | Performed by: FAMILY MEDICINE

## 2023-06-28 ENCOUNTER — OFFICE VISIT (OUTPATIENT)
Dept: FAMILY MEDICINE | Facility: CLINIC | Age: 58
End: 2023-06-28
Attending: FAMILY MEDICINE
Payer: COMMERCIAL

## 2023-06-28 VITALS
WEIGHT: 210 LBS | HEIGHT: 70 IN | SYSTOLIC BLOOD PRESSURE: 130 MMHG | BODY MASS INDEX: 30.06 KG/M2 | DIASTOLIC BLOOD PRESSURE: 80 MMHG | HEART RATE: 80 BPM

## 2023-06-28 DIAGNOSIS — E11.65 TYPE 2 DIABETES MELLITUS WITH HYPERGLYCEMIA, WITHOUT LONG-TERM CURRENT USE OF INSULIN: Primary | ICD-10-CM

## 2023-06-28 DIAGNOSIS — I10 ESSENTIAL HYPERTENSION: ICD-10-CM

## 2023-06-28 DIAGNOSIS — E78.2 MIXED HYPERLIPIDEMIA: ICD-10-CM

## 2023-06-28 PROCEDURE — 3060F POS MICROALBUMINURIA REV: CPT | Mod: CPTII,95,, | Performed by: FAMILY MEDICINE

## 2023-06-28 PROCEDURE — 3008F BODY MASS INDEX DOCD: CPT | Mod: CPTII,95,, | Performed by: FAMILY MEDICINE

## 2023-06-28 PROCEDURE — 3044F PR MOST RECENT HEMOGLOBIN A1C LEVEL <7.0%: ICD-10-PCS | Mod: CPTII,95,, | Performed by: FAMILY MEDICINE

## 2023-06-28 PROCEDURE — 3066F PR DOCUMENTATION OF TREATMENT FOR NEPHROPATHY: ICD-10-PCS | Mod: CPTII,95,, | Performed by: FAMILY MEDICINE

## 2023-06-28 PROCEDURE — 4010F PR ACE/ARB THEARPY RXD/TAKEN: ICD-10-PCS | Mod: CPTII,95,, | Performed by: FAMILY MEDICINE

## 2023-06-28 PROCEDURE — 3075F SYST BP GE 130 - 139MM HG: CPT | Mod: CPTII,95,, | Performed by: FAMILY MEDICINE

## 2023-06-28 PROCEDURE — 3075F PR MOST RECENT SYSTOLIC BLOOD PRESS GE 130-139MM HG: ICD-10-PCS | Mod: CPTII,95,, | Performed by: FAMILY MEDICINE

## 2023-06-28 PROCEDURE — 3079F PR MOST RECENT DIASTOLIC BLOOD PRESSURE 80-89 MM HG: ICD-10-PCS | Mod: CPTII,95,, | Performed by: FAMILY MEDICINE

## 2023-06-28 PROCEDURE — 1160F PR REVIEW ALL MEDS BY PRESCRIBER/CLIN PHARMACIST DOCUMENTED: ICD-10-PCS | Mod: CPTII,95,, | Performed by: FAMILY MEDICINE

## 2023-06-28 PROCEDURE — 1159F MED LIST DOCD IN RCRD: CPT | Mod: CPTII,95,, | Performed by: FAMILY MEDICINE

## 2023-06-28 PROCEDURE — 4010F ACE/ARB THERAPY RXD/TAKEN: CPT | Mod: CPTII,95,, | Performed by: FAMILY MEDICINE

## 2023-06-28 PROCEDURE — 3044F HG A1C LEVEL LT 7.0%: CPT | Mod: CPTII,95,, | Performed by: FAMILY MEDICINE

## 2023-06-28 PROCEDURE — 3066F NEPHROPATHY DOC TX: CPT | Mod: CPTII,95,, | Performed by: FAMILY MEDICINE

## 2023-06-28 PROCEDURE — 99214 OFFICE O/P EST MOD 30 MIN: CPT | Mod: 95,,, | Performed by: FAMILY MEDICINE

## 2023-06-28 PROCEDURE — 99214 PR OFFICE/OUTPT VISIT, EST, LEVL IV, 30-39 MIN: ICD-10-PCS | Mod: 95,,, | Performed by: FAMILY MEDICINE

## 2023-06-28 PROCEDURE — 1160F RVW MEDS BY RX/DR IN RCRD: CPT | Mod: CPTII,95,, | Performed by: FAMILY MEDICINE

## 2023-06-28 PROCEDURE — 1159F PR MEDICATION LIST DOCUMENTED IN MEDICAL RECORD: ICD-10-PCS | Mod: CPTII,95,, | Performed by: FAMILY MEDICINE

## 2023-06-28 PROCEDURE — 3079F DIAST BP 80-89 MM HG: CPT | Mod: CPTII,95,, | Performed by: FAMILY MEDICINE

## 2023-06-28 PROCEDURE — 3060F PR POS MICROALBUMINURIA RESULT DOCUMENTED/REVIEW: ICD-10-PCS | Mod: CPTII,95,, | Performed by: FAMILY MEDICINE

## 2023-06-28 PROCEDURE — 3008F PR BODY MASS INDEX (BMI) DOCUMENTED: ICD-10-PCS | Mod: CPTII,95,, | Performed by: FAMILY MEDICINE

## 2023-06-28 NOTE — PROGRESS NOTES
The patient location is: home  The chief complaint leading to consultation is: dm    Visit type: audiovisual    Face to Face time with patient: 20  30 minutes of total time spent on the encounter, which includes face to face time and non-face to face time preparing to see the patient (eg, review of tests), Obtaining and/or reviewing separately obtained history, Documenting clinical information in the electronic or other health record, Independently interpreting results (not separately reported) and communicating results to the patient/family/caregiver, or Care coordination (not separately reported).         Each patient to whom he or she provides medical services by telemedicine is:  (1) informed of the relationship between the physician and patient and the respective role of any other health care provider with respect to management of the patient; and (2) notified that he or she may decline to receive medical services by telemedicine and may withdraw from such care at any time.    Notes:   Subjective:       Patient ID: Wily Rodrigues is a 58 y.o. male.    Chief Complaint: Diabetes    Diabetes  He has type 2 diabetes mellitus. No MedicAlert identification noted. The initial diagnosis of diabetes was made 4 years ago. Pertinent negatives for hypoglycemia include no confusion, dizziness, headaches, hunger, mood changes, nervousness/anxiousness, pallor, seizures, sleepiness, speech difficulty, sweats or tremors. Pertinent negatives for diabetes include no blurred vision, no chest pain, no fatigue, no foot paresthesias, no foot ulcerations, no polydipsia, no polyphagia, no polyuria, no visual change, no weakness and no weight loss. Pertinent negatives for hypoglycemia complications include no blackouts, no hospitalization, no nocturnal hypoglycemia, no required assistance and no required glucagon injection. Symptoms are improving. Pertinent negatives for diabetic complications include no autonomic neuropathy, CVA,  "heart disease, impotence, nephropathy, peripheral neuropathy, PVD or retinopathy. Risk factors for coronary artery disease include dyslipidemia, hypertension, diabetes mellitus and male sex. Current diabetic treatment includes oral agent (triple therapy). He is compliant with treatment all of the time. His weight is stable. He is following a generally healthy diet. Meal planning includes avoidance of concentrated sweets. He has not had a previous visit with a dietitian. He participates in exercise three times a week. His home blood glucose trend is decreasing steadily. He does not see a podiatrist.Eye exam is current.   Pt is in virtual visit for follow up of dm on multiple meds including mounjaro hgb a1c now in the 6's pt is feeling well  Pt has htn bp fine no sob/cp  Pt has hypercholesterolemia on statin no muscle aches    Review of Systems   Constitutional:  Negative for chills, fatigue, fever and weight loss.   Eyes:  Negative for blurred vision.   Respiratory:  Negative for cough, chest tightness and shortness of breath.    Cardiovascular:  Negative for chest pain.   Gastrointestinal:  Negative for abdominal distention, abdominal pain and blood in stool.   Endocrine: Negative for polydipsia, polyphagia and polyuria.   Genitourinary:  Negative for impotence.   Skin:  Negative for pallor.   Neurological:  Negative for dizziness, tremors, seizures, speech difficulty, weakness and headaches.   Psychiatric/Behavioral:  Negative for confusion. The patient is not nervous/anxious.      Objective:    /80   Pulse 80   Ht 5' 10" (1.778 m)   Wt 95.3 kg (210 lb)   BMI 30.13 kg/m²     Physical Exam  Constitutional:       Appearance: He is obese. He is not ill-appearing.   HENT:      Head: Normocephalic and atraumatic.   Eyes:      Extraocular Movements: Extraocular movements intact.   Pulmonary:      Effort: Pulmonary effort is normal. No respiratory distress.   Neurological:      General: No focal deficit present. " "     Mental Status: He is alert and oriented to person, place, and time.      Cranial Nerves: No cranial nerve deficit.      Coordination: Coordination normal.   Psychiatric:         Mood and Affect: Mood normal.         Behavior: Behavior normal.         Thought Content: Thought content normal.         Judgment: Judgment normal.     Hgb a1c 6.5 in 6/2023  Assessment:       1. Type 2 diabetes mellitus with hyperglycemia, without long-term current use of insulin    2. Essential hypertension    3. Mixed hyperlipidemia        Plan:     Orders cmp lipid hgb a1c  Cont meds  Ada diet  Graded exercise  Rtc quarterly        "This note will not be shared with the patient."     "

## 2023-07-18 RX ORDER — PIOGLITAZONEHYDROCHLORIDE 30 MG/1
TABLET ORAL
Qty: 90 TABLET | Refills: 1 | Status: SHIPPED | OUTPATIENT
Start: 2023-07-18 | End: 2024-02-28

## 2023-07-18 RX ORDER — ATORVASTATIN CALCIUM 20 MG/1
TABLET, FILM COATED ORAL
Qty: 90 TABLET | Refills: 3 | Status: SHIPPED | OUTPATIENT
Start: 2023-07-18

## 2023-07-18 NOTE — TELEPHONE ENCOUNTER
Care Due:                  Date            Visit Type   Department     Provider  --------------------------------------------------------------------------------                                ESTABLISHED                              PATIENT -    [facility]  Last Visit: 06-      [employer]      MEDICINE       Areli Kramer  Next Visit: None Scheduled  None         None Found                                                            Last  Test          Frequency    Reason                     Performed    Due Date  --------------------------------------------------------------------------------    CBC.........  12 months..  valACYclovir.............  09- 09-    [facility] Embedded Care Due Messages. Reference number: [id number].   7/18/2023 8:02:06 AM CDT

## 2023-07-18 NOTE — TELEPHONE ENCOUNTER
Provider Staff:  Action required for this patient     Please see care gap opportunities below in Care Due Message.    Thanks!  [facility]     Appointments      Date Provider   Last Visit   6/28/2023 Areli Kramer MD   Next Visit   Visit date not found Areli Kramer MD     Refill Decision Note   Wily Rodrigues  is requesting a refill authorization.  Brief Assessment and Rationale for Refill:  Approve     Medication Therapy Plan:         Comments:     Note composed:10:32 AM 07/18/2023

## 2023-09-04 NOTE — TELEPHONE ENCOUNTER
No care due was identified.  [facility] Embedded Care Due Messages. Reference number: [id number].   9/04/2023 4:40:15 PM CDT

## 2023-09-05 RX ORDER — METFORMIN HYDROCHLORIDE 500 MG/1
1000 TABLET, EXTENDED RELEASE ORAL DAILY
Qty: 270 TABLET | Refills: 3
Start: 2023-09-05 | End: 2023-09-28

## 2023-09-26 ENCOUNTER — PATIENT MESSAGE (OUTPATIENT)
Dept: ADMINISTRATIVE | Facility: HOSPITAL | Age: 58
End: 2023-09-26
Payer: COMMERCIAL

## 2023-09-26 ENCOUNTER — PATIENT MESSAGE (OUTPATIENT)
Dept: FAMILY MEDICINE | Facility: CLINIC | Age: 58
End: 2023-09-26
Payer: COMMERCIAL

## 2023-09-26 ENCOUNTER — PATIENT OUTREACH (OUTPATIENT)
Dept: ADMINISTRATIVE | Facility: HOSPITAL | Age: 58
End: 2023-09-26
Payer: COMMERCIAL

## 2023-09-27 DIAGNOSIS — Z00.00 ANNUAL PHYSICAL EXAM: Primary | ICD-10-CM

## 2023-09-27 DIAGNOSIS — E11.65 TYPE 2 DIABETES MELLITUS WITH HYPERGLYCEMIA, WITHOUT LONG-TERM CURRENT USE OF INSULIN: ICD-10-CM

## 2023-09-27 DIAGNOSIS — Z12.5 SCREENING FOR PROSTATE CANCER: ICD-10-CM

## 2023-10-02 ENCOUNTER — PATIENT MESSAGE (OUTPATIENT)
Dept: FAMILY MEDICINE | Facility: CLINIC | Age: 58
End: 2023-10-02
Payer: COMMERCIAL

## 2023-11-05 ENCOUNTER — PATIENT MESSAGE (OUTPATIENT)
Dept: FAMILY MEDICINE | Facility: CLINIC | Age: 58
End: 2023-11-05
Payer: COMMERCIAL

## 2023-11-09 NOTE — TELEPHONE ENCOUNTER
Refill Routing Note   Medication(s) are not appropriate for processing by [facility] for the following reason(s):      Required labs outdated    [facility] action(s):  Defer Care Due:  None identified            Appointments  past 12m or future 3m with PCP    Date Provider   Last Visit   6/28/2023 Areli Kramer MD   Next Visit   Visit date not found Areli Kramer MD   ED visits in past 90 days: 0        Note composed:1:35 PM 11/09/2023

## 2023-11-09 NOTE — TELEPHONE ENCOUNTER
No care due was identified.  [facility] Embedded Care Due Messages. Reference number: [id number].   11/09/2023 8:00:30 AM CST

## 2023-11-10 ENCOUNTER — PATIENT MESSAGE (OUTPATIENT)
Dept: FAMILY MEDICINE | Facility: CLINIC | Age: 58
End: 2023-11-10
Payer: COMMERCIAL

## 2023-11-10 RX ORDER — LEVOTHYROXINE SODIUM 25 UG/1
25 TABLET ORAL
Qty: 90 TABLET | Refills: 0 | Status: SHIPPED | OUTPATIENT
Start: 2023-11-10 | End: 2024-01-29

## 2023-12-24 NOTE — TELEPHONE ENCOUNTER
Care Due:                  Date            Visit Type   Department     Provider  --------------------------------------------------------------------------------                                ESTABLISHED                              PATIENT -    [facility]  Last Visit: 06-      [facility]       Areli Kramer  Next Visit: None Scheduled  None         None Found                                                            Last  Test          Frequency    Reason                     Performed    Due Date  --------------------------------------------------------------------------------    CBC.........  12 months..  valACYclovir.............  09- 09-    HBA1C.......  6 months...  glimepiride, metFORMIN,    06- 12-                             pioglitazone, tirzepatide    TSH.........  12 months..  levothyroxine............  09- 09-    [facility] Embedded Care Due Messages. Reference number: [id number].   12/24/2023 10:09:39 AM CST

## 2023-12-26 RX ORDER — OMEGA-3-ACID ETHYL ESTERS 1 G/1
2 CAPSULE, LIQUID FILLED ORAL 2 TIMES DAILY
Qty: 360 CAPSULE | Refills: 1 | Status: SHIPPED | OUTPATIENT
Start: 2023-12-26

## 2023-12-26 NOTE — TELEPHONE ENCOUNTER
Refill Decision Note   Wily Rodrigues  is requesting a refill authorization.  Brief Assessment and Rationale for Refill:  Approve     Medication Therapy Plan:  FLOS 1.16.2024      Comments:     Note composed:12:40 PM 12/26/2023

## 2023-12-31 NOTE — TELEPHONE ENCOUNTER
No care due was identified.  [facility] Embedded Care Due Messages. Reference number: [id number].   12/31/2023 5:03:45 PM CST

## 2024-01-02 NOTE — TELEPHONE ENCOUNTER
Refill Routing Note   Medication(s) are not appropriate for processing by [facility] for the following reason(s):        Required labs outdated    [facility] action(s):  Defer               Appointments  past 12m or future 3m with PCP    Date Provider   Last Visit   6/28/2023 Areli Kramer MD   Next Visit   1/23/2024 Areli Kramer MD   ED visits in past 90 days: 0        Note composed:1:51 PM 01/02/2024

## 2024-01-03 RX ORDER — GLIMEPIRIDE 4 MG/1
4 TABLET ORAL
Qty: 90 TABLET | Refills: 0 | Status: SHIPPED | OUTPATIENT
Start: 2024-01-03 | End: 2024-01-29

## 2024-01-16 ENCOUNTER — LAB VISIT (OUTPATIENT)
Dept: LAB | Facility: HOSPITAL | Age: 59
End: 2024-01-16
Attending: FAMILY MEDICINE
Payer: COMMERCIAL

## 2024-01-16 DIAGNOSIS — Z00.00 ANNUAL PHYSICAL EXAM: ICD-10-CM

## 2024-01-16 DIAGNOSIS — E11.65 TYPE 2 DIABETES MELLITUS WITH HYPERGLYCEMIA, WITHOUT LONG-TERM CURRENT USE OF INSULIN: ICD-10-CM

## 2024-01-16 LAB
ALBUMIN/CREAT UR: 19.5 UG/MG (ref 0–30)
CREAT UR-MCNC: 164 MG/DL (ref 23–375)
MICROALBUMIN UR DL<=1MG/L-MCNC: 32 UG/ML

## 2024-01-16 PROCEDURE — 82043 UR ALBUMIN QUANTITATIVE: CPT | Performed by: FAMILY MEDICINE

## 2024-01-23 ENCOUNTER — OFFICE VISIT (OUTPATIENT)
Dept: FAMILY MEDICINE | Facility: CLINIC | Age: 59
End: 2024-01-23
Attending: FAMILY MEDICINE
Payer: COMMERCIAL

## 2024-01-23 VITALS
BODY MASS INDEX: 31.98 KG/M2 | OXYGEN SATURATION: 98 % | HEART RATE: 83 BPM | WEIGHT: 222.88 LBS | SYSTOLIC BLOOD PRESSURE: 135 MMHG | DIASTOLIC BLOOD PRESSURE: 77 MMHG

## 2024-01-23 DIAGNOSIS — E78.2 MIXED HYPERLIPIDEMIA: ICD-10-CM

## 2024-01-23 DIAGNOSIS — E11.65 TYPE 2 DIABETES MELLITUS WITH HYPERGLYCEMIA, WITHOUT LONG-TERM CURRENT USE OF INSULIN: ICD-10-CM

## 2024-01-23 DIAGNOSIS — Z00.00 ANNUAL PHYSICAL EXAM: Primary | ICD-10-CM

## 2024-01-23 DIAGNOSIS — E66.9 DIABETES MELLITUS TYPE 2 IN OBESE: ICD-10-CM

## 2024-01-23 DIAGNOSIS — E11.69 DIABETES MELLITUS TYPE 2 IN OBESE: ICD-10-CM

## 2024-01-23 DIAGNOSIS — J44.1 COPD EXACERBATION: ICD-10-CM

## 2024-01-23 DIAGNOSIS — Z12.12 SCREENING FOR COLORECTAL CANCER: ICD-10-CM

## 2024-01-23 DIAGNOSIS — Z12.11 SCREENING FOR COLORECTAL CANCER: ICD-10-CM

## 2024-01-23 DIAGNOSIS — I10 ESSENTIAL HYPERTENSION: ICD-10-CM

## 2024-01-23 PROCEDURE — 3061F NEG MICROALBUMINURIA REV: CPT | Mod: CPTII,S$GLB,, | Performed by: FAMILY MEDICINE

## 2024-01-23 PROCEDURE — 3075F SYST BP GE 130 - 139MM HG: CPT | Mod: CPTII,S$GLB,, | Performed by: FAMILY MEDICINE

## 2024-01-23 PROCEDURE — 3066F NEPHROPATHY DOC TX: CPT | Mod: CPTII,S$GLB,, | Performed by: FAMILY MEDICINE

## 2024-01-23 PROCEDURE — 3008F BODY MASS INDEX DOCD: CPT | Mod: CPTII,S$GLB,, | Performed by: FAMILY MEDICINE

## 2024-01-23 PROCEDURE — 1159F MED LIST DOCD IN RCRD: CPT | Mod: CPTII,S$GLB,, | Performed by: FAMILY MEDICINE

## 2024-01-23 PROCEDURE — 3078F DIAST BP <80 MM HG: CPT | Mod: CPTII,S$GLB,, | Performed by: FAMILY MEDICINE

## 2024-01-23 PROCEDURE — 99396 PREV VISIT EST AGE 40-64: CPT | Mod: S$GLB,,, | Performed by: FAMILY MEDICINE

## 2024-01-23 PROCEDURE — 99999 PR PBB SHADOW E&M-EST. PATIENT-LVL V: CPT | Mod: PBBFAC,,, | Performed by: FAMILY MEDICINE

## 2024-01-23 PROCEDURE — 3052F HG A1C>EQUAL 8.0%<EQUAL 9.0%: CPT | Mod: CPTII,S$GLB,, | Performed by: FAMILY MEDICINE

## 2024-01-23 NOTE — PROGRESS NOTES
Subjective:       Patient ID: Wily Rodrigues is a 58 y.o. male.    Chief Complaint: Annual Exam    HPI  Pt is here for annual exam pt is well no sob/cp no cough chest congestion no sore throat uri symptoms   Pt denies n/v/f/c/d/c no change in bowel habits no brbpr  Pt denies dysuria hematuria no acute urinary complaints  Pt has dm fbs in the mid 100's with hgb A1c now in the 6's no polys on mounjaro metformin   Pt has hyperlipidemia on statin he is not taking his omega 3's consistently with elevated tri's this time  Pt has htn bp fine no sob/cp on ace no cough   Review of Systems   Constitutional:  Negative for activity change, chills, fatigue and fever.   HENT:  Negative for congestion, ear pain, hearing loss, postnasal drip, rhinorrhea, sinus pressure and sore throat.    Eyes:  Negative for photophobia, pain, redness and visual disturbance.   Respiratory:  Negative for cough, chest tightness and shortness of breath.    Cardiovascular:  Negative for chest pain, palpitations and leg swelling.   Gastrointestinal:  Negative for abdominal pain, blood in stool, constipation, diarrhea, nausea and vomiting.   Endocrine: Negative for polydipsia and polyuria.   Genitourinary:  Negative for decreased urine volume, difficulty urinating, dysuria, frequency, penile discharge and urgency.   Musculoskeletal:  Negative for back pain, joint swelling and neck pain.   Skin:  Negative for color change, pallor and rash.   Neurological:  Negative for dizziness, seizures, speech difficulty and numbness.   Hematological:  Does not bruise/bleed easily.   Psychiatric/Behavioral:  Negative for behavioral problems, confusion, decreased concentration and suicidal ideas.        Objective:    /77   Pulse 83   Wt 101.1 kg (222 lb 14.4 oz)   SpO2 98%   BMI 31.98 kg/m²     Physical Exam  Constitutional:       General: He is not in acute distress.     Appearance: Normal appearance. He is well-developed. He is not ill-appearing.   HENT:  "     Head: Normocephalic and atraumatic.      Nose: Nose normal.   Eyes:      Pupils: Pupils are equal, round, and reactive to light.   Neck:      Thyroid: No thyromegaly.   Cardiovascular:      Rate and Rhythm: Normal rate and regular rhythm.      Heart sounds: Normal heart sounds. No murmur heard.     No friction rub. No gallop.   Pulmonary:      Effort: Pulmonary effort is normal. No respiratory distress.      Breath sounds: Normal breath sounds.   Abdominal:      General: Bowel sounds are normal. There is no distension.      Palpations: Abdomen is soft.      Tenderness: There is no abdominal tenderness. There is no guarding or rebound.   Genitourinary:     Comments: declined  Musculoskeletal:         General: No tenderness. Normal range of motion.      Cervical back: Normal range of motion and neck supple.   Skin:     General: Skin is warm and dry.      Findings: No erythema.   Neurological:      General: No focal deficit present.      Mental Status: He is alert and oriented to person, place, and time.      Cranial Nerves: No cranial nerve deficit.      Coordination: Coordination normal.   Psychiatric:         Mood and Affect: Mood normal.         Behavior: Behavior normal.         Thought Content: Thought content normal.         Judgment: Judgment normal.         Assessment:       1. Annual physical exam    2. COPD exacerbation    3. Type 2 diabetes mellitus with hyperglycemia, without long-term current use of insulin    4. Screening for colorectal cancer    5. Essential hypertension    6. Mixed hyperlipidemia    7. Diabetes mellitus type 2 in obese        Plan:     Orders cbc cmp lipid tsh urine hgb A1c done pta  Cont meds  Restart omega 3   Take meds consistently as directed  Ada diet  Graded exercise  Rtc quarterly    Health maintenance  Discussed with pt        "This note will not be shared with the patient."     "

## 2024-01-24 ENCOUNTER — TELEPHONE (OUTPATIENT)
Dept: ORTHOPEDICS | Facility: CLINIC | Age: 59
End: 2024-01-24
Payer: COMMERCIAL

## 2024-01-26 ENCOUNTER — TELEPHONE (OUTPATIENT)
Dept: ORTHOPEDICS | Facility: CLINIC | Age: 59
End: 2024-01-26
Payer: COMMERCIAL

## 2024-01-28 NOTE — TELEPHONE ENCOUNTER
No care due was identified.  [facility] Embedded Care Due Messages. Reference number: [id number].   1/28/2024 1:28:03 PM CST

## 2024-01-29 RX ORDER — LEVOTHYROXINE SODIUM 25 UG/1
25 TABLET ORAL
Qty: 90 TABLET | Refills: 3 | Status: SHIPPED | OUTPATIENT
Start: 2024-01-29

## 2024-01-29 RX ORDER — GLIMEPIRIDE 4 MG/1
4 TABLET ORAL
Qty: 90 TABLET | Refills: 1 | Status: SHIPPED | OUTPATIENT
Start: 2024-01-29

## 2024-01-29 NOTE — TELEPHONE ENCOUNTER
Refill Decision Note   Wily Rodrigues  is requesting a refill authorization.  Brief Assessment and Rationale for Refill:  Approve     Medication Therapy Plan:        Comments:     Note composed:10:39 AM 01/29/2024

## 2024-02-10 NOTE — TELEPHONE ENCOUNTER
No care due was identified.  [facility] Embedded Care Due Messages. Reference number: [id number].   2/10/2024 9:43:29 AM CST

## 2024-02-11 RX ORDER — LISINOPRIL 40 MG/1
TABLET ORAL
Qty: 90 TABLET | Refills: 3 | Status: SHIPPED | OUTPATIENT
Start: 2024-02-11

## 2024-02-11 NOTE — TELEPHONE ENCOUNTER
Refill Decision Note   Wily Rodrigues  is requesting a refill authorization.  Brief Assessment and Rationale for Refill:  Approve     Medication Therapy Plan:         Comments:     Note composed:9:00 AM 02/11/2024

## 2024-02-26 ENCOUNTER — PATIENT MESSAGE (OUTPATIENT)
Dept: NEUROLOGY | Facility: CLINIC | Age: 59
End: 2024-02-26
Payer: COMMERCIAL

## 2024-02-28 ENCOUNTER — OFFICE VISIT (OUTPATIENT)
Dept: SLEEP MEDICINE | Facility: CLINIC | Age: 59
End: 2024-02-28
Payer: COMMERCIAL

## 2024-02-28 VITALS
WEIGHT: 222.88 LBS | SYSTOLIC BLOOD PRESSURE: 147 MMHG | HEART RATE: 88 BPM | HEIGHT: 70 IN | BODY MASS INDEX: 31.91 KG/M2 | OXYGEN SATURATION: 99 % | DIASTOLIC BLOOD PRESSURE: 88 MMHG

## 2024-02-28 DIAGNOSIS — J45.909 ASTHMA, UNSPECIFIED ASTHMA SEVERITY, UNSPECIFIED WHETHER COMPLICATED, UNSPECIFIED WHETHER PERSISTENT: ICD-10-CM

## 2024-02-28 DIAGNOSIS — F90.9 ATTENTION DEFICIT HYPERACTIVITY DISORDER (ADHD), UNSPECIFIED ADHD TYPE: ICD-10-CM

## 2024-02-28 DIAGNOSIS — I10 ESSENTIAL HYPERTENSION: Primary | ICD-10-CM

## 2024-02-28 DIAGNOSIS — J44.9 CHRONIC OBSTRUCTIVE PULMONARY DISEASE, UNSPECIFIED COPD TYPE: ICD-10-CM

## 2024-02-28 DIAGNOSIS — G47.33 OBSTRUCTIVE SLEEP APNEA: ICD-10-CM

## 2024-02-28 DIAGNOSIS — G47.00 INSOMNIA, UNSPECIFIED TYPE: ICD-10-CM

## 2024-02-28 PROCEDURE — 99999 PR PBB SHADOW E&M-EST. PATIENT-LVL IV: CPT | Mod: PBBFAC,,, | Performed by: NURSE PRACTITIONER

## 2024-02-28 PROCEDURE — 3052F HG A1C>EQUAL 8.0%<EQUAL 9.0%: CPT | Mod: CPTII,S$GLB,, | Performed by: NURSE PRACTITIONER

## 2024-02-28 PROCEDURE — 3066F NEPHROPATHY DOC TX: CPT | Mod: CPTII,S$GLB,, | Performed by: NURSE PRACTITIONER

## 2024-02-28 PROCEDURE — 3077F SYST BP >= 140 MM HG: CPT | Mod: CPTII,S$GLB,, | Performed by: NURSE PRACTITIONER

## 2024-02-28 PROCEDURE — 3061F NEG MICROALBUMINURIA REV: CPT | Mod: CPTII,S$GLB,, | Performed by: NURSE PRACTITIONER

## 2024-02-28 PROCEDURE — 3079F DIAST BP 80-89 MM HG: CPT | Mod: CPTII,S$GLB,, | Performed by: NURSE PRACTITIONER

## 2024-02-28 PROCEDURE — 4010F ACE/ARB THERAPY RXD/TAKEN: CPT | Mod: CPTII,S$GLB,, | Performed by: NURSE PRACTITIONER

## 2024-02-28 PROCEDURE — 99214 OFFICE O/P EST MOD 30 MIN: CPT | Mod: S$GLB,,, | Performed by: NURSE PRACTITIONER

## 2024-02-28 PROCEDURE — 1159F MED LIST DOCD IN RCRD: CPT | Mod: CPTII,S$GLB,, | Performed by: NURSE PRACTITIONER

## 2024-02-28 PROCEDURE — 3008F BODY MASS INDEX DOCD: CPT | Mod: CPTII,S$GLB,, | Performed by: NURSE PRACTITIONER

## 2024-02-28 RX ORDER — PIOGLITAZONEHYDROCHLORIDE 30 MG/1
TABLET ORAL
Qty: 90 TABLET | Refills: 3 | Status: SHIPPED | OUTPATIENT
Start: 2024-02-28

## 2024-02-28 RX ORDER — ZOLPIDEM TARTRATE 5 MG/1
5 TABLET ORAL NIGHTLY PRN
Qty: 3 TABLET | Refills: 0 | Status: SHIPPED | OUTPATIENT
Start: 2024-02-28 | End: 2024-04-02 | Stop reason: SDUPTHER

## 2024-02-28 NOTE — TELEPHONE ENCOUNTER
Refill Decision Note   Wily Rodrigues  is requesting a refill authorization.  Brief Assessment and Rationale for Refill:  Approve     Medication Therapy Plan:         Comments:     Note composed:10:57 AM 02/28/2024

## 2024-02-28 NOTE — PROGRESS NOTES
"Cc: AJ, last seen 6/2021    Since last seen 2.5 yrs ago,  he has not been able to use apap much. Trouble falling asleep and waking up/anxiety affecting keeping mask on his face. Teeth clenches and side sleeper crunched up. Ready not to have requal study re-evaluate severity. Wants to sleep better, he's having daytime afternoon sleepiness despite taking stimulants for ADHD. Asthma always worse in summer months, has COPD  When used PAP, noticed a big difference , sleepiness was better. Was easier to get up in am. Snoring was resolved.     BP (!) 147/88 (BP Location: Left arm, Patient Position: Sitting, BP Method: Small (Automatic))   Pulse 88   Ht 5' 10" (1.778 m)   Wt 101.1 kg (222 lb 14.2 oz)   SpO2 99%   BMI 31.98 kg/m²         HISTORY all VB:  1/14/16: cc: AJ mgt  HISTORY OF PRESENT ILLNESS:Wily Rodrigues a 59 y.o.  male presents for the management of obstructive sleep apnea. This is his initial visit in Sleep Clinic. Previously seen by Dr. Lee in different dept 2009.  He was diagnosed with AJ previously and has been using it inconsistently of late. Split-night study in 2009 revealed effective pressure of 22/20cm which was intolerable to him. A bipap titration study in 2012 (221#) revealed needed pressures up to 20/16cm to eliminate snoring. Mouth leak and arousals could have contributed to suboptimal pressures. He was switched to autobipap therapy 16/14cm. He has used it inconsistently. He is motivated now to begin using it better. He is seeing a health  (turns 51 next month). He uses a nasal mask. He has resumed using it recently for a short time. Without therapy, he reports snoring. He report symptoms of disruptive snoring, un-refreshing sleep, and excessive daytime sleepiness.     Denies symptoms of restless legs or kicking during sleep.     On todays [place] Sleepiness Scale the patient scores a 13/24.     Interrogation: old remstar machine fair condition, manometer 12cm. Heat at 4. Total " "therapy hrs 1517h. 30d avg 0:36h/n    4/2018:   Since last seen 2017,he got setup apap 10-20cm. "time for my annual non-compliance visit". No machine today. Using dreamwear mask. When used, notices a big difference , sleepiness is better. Was easier to get up in am. Snoring resolved. Just got new supplies from [employer]. Falls asleep w/o mask a lot, tv on. Seeing health  still. Using sleep apnea pillow from [facility] has helped. Wants med to help sleep onset. ESS=9. +anixety/tense in shoulers, needs to relax more.  HgBA1c 5.6 (today)  Interrogation- no machine.     1/2019:Since last seen 2018 he has not been able to use apap much. Falls asleep w/o mask on. Jaw tense, hard to relax bedtime. Will surf tv. Still working with . Needs to resume exercise, used to run 80mi/week and attend gym daily. Feeling tired during daytime.Using dreamwear mask. When used, notices a big difference , sleepiness is better. Was easier to get up in am. Snoring was resolved.   HgBA1c 6.1 (2019)  BP stable  [facility] provided q3mos, stable mood SNRI      PSG 3/27/09: (207#) RDI 9.3 (REM RDI 22.3)/low sat 76%--? 22/20cm bipap  Titration study (216#) 1/2017: effective side REM cpap 10cm but no supine REM seen, plan apap 10-20cm    ASSESSMENT:   Obstructive sleep apnea (AJ), mild,previously intolerable to bipap high pressure settings. 2009 switched to autobipap with persistent use of therapy, but limited. Mouth leaks potential contributing factor. Ready to resume consistent use of PAP therapy for improvement of snoring, EDS, disrupted sleep. Needs new supplies/eligible for new machine. 2/16/17: Had titration study and PAP fixed effective for side sleep, unknown for supine REM. Apap setup planned today 4/10/18: adherent with apap, but limited recently, needs regular monitoring to stay with use/habit. Sleep onset issues/anxiety affecting use and poor sleep habits 1/15/19: continued limited adherence, falling asleep watchingtv w/o mask on an " issue, hard to relax bedtime. Very motivated to do better, get better sleep and to feel less tired. 6/30/21 and 2/2024: having ongoing poor sleep/daytime sleepiness w/o being able to sleep with mask on/keep mask on.Wants requal study to gauge severity/presence of AJ and consider either alternative treatment option vs resuming pap again  He has medical comorbidities of obesity, hypertension, DM2 (suboptimal control), hypothyroidism.  Insomnia  Anxiety  COPD and severe Asthma    PLAN:   1. PSG (COPD, severe asthma)(ok to take ambien night of study)  2. Continue to see PCP re: HTN mgt and DM2/continue meds

## 2024-02-28 NOTE — TELEPHONE ENCOUNTER
No care due was identified.  [facility] Embedded Care Due Messages. Reference number: [id number].   2/28/2024 8:36:23 AM CST

## 2024-03-06 ENCOUNTER — TELEPHONE (OUTPATIENT)
Dept: SLEEP MEDICINE | Facility: OTHER | Age: 59
End: 2024-03-06
Payer: COMMERCIAL

## 2024-03-13 DIAGNOSIS — J45.30 ASTHMA IN ADULT, MILD PERSISTENT, UNCOMPLICATED: ICD-10-CM

## 2024-03-13 RX ORDER — ALBUTEROL SULFATE 90 UG/1
2 AEROSOL, METERED RESPIRATORY (INHALATION) EVERY 6 HOURS PRN
Qty: 18 G | Refills: 11 | Status: SHIPPED | OUTPATIENT
Start: 2024-03-13

## 2024-03-13 RX ORDER — CETIRIZINE HYDROCHLORIDE 10 MG/1
10 TABLET ORAL DAILY
Qty: 90 TABLET | Refills: 3 | Status: SHIPPED | OUTPATIENT
Start: 2024-03-13

## 2024-03-13 NOTE — TELEPHONE ENCOUNTER
No care due was identified.  [facility] Embedded Care Due Messages. Reference number: [id number].   3/13/2024 7:45:26 AM CDT

## 2024-03-15 ENCOUNTER — HOSPITAL ENCOUNTER (OUTPATIENT)
Dept: SLEEP MEDICINE | Facility: OTHER | Age: 59
Discharge: HOME | End: 2024-03-15
Payer: COMMERCIAL

## 2024-03-15 DIAGNOSIS — J44.9 CHRONIC OBSTRUCTIVE PULMONARY DISEASE, UNSPECIFIED COPD TYPE: ICD-10-CM

## 2024-03-15 DIAGNOSIS — G47.33 OBSTRUCTIVE SLEEP APNEA: ICD-10-CM

## 2024-03-15 DIAGNOSIS — J45.909 ASTHMA, UNSPECIFIED ASTHMA SEVERITY, UNSPECIFIED WHETHER COMPLICATED, UNSPECIFIED WHETHER PERSISTENT: ICD-10-CM

## 2024-03-15 PROCEDURE — 95811 POLYSOM 6/>YRS CPAP 4/> PARM: CPT

## 2024-03-16 NOTE — PROGRESS NOTES
A split-night sleep study was performed on [facility]. The following was explained to the pt prior to the study: the time to bed and wake time, the set-up process timeframe and the purpose of each sensor, the reason (if sensors fall off) the technician will need to enter the room during the night, the possibility of the tech fitting a PAP mask on pt for treatment in the middle of the night, and how to call out for assistance during the night. A post-study letter was handed to the pt in the morning.     Mask used:   Medium N20 AirFit nasal mask

## 2024-03-22 NOTE — TELEPHONE ENCOUNTER
No care due was identified.  [facility] Embedded Care Due Messages. Reference number: [id number].   3/22/2024 8:11:41 AM CDT

## 2024-03-23 RX ORDER — METFORMIN HYDROCHLORIDE 500 MG/1
500 TABLET, EXTENDED RELEASE ORAL
Qty: 90 TABLET | Refills: 1 | Status: SHIPPED | OUTPATIENT
Start: 2024-03-23

## 2024-03-23 NOTE — PROCEDURES
"[facility]    Split-Night Study Interpretation Report    Patient Name:  Wily Rodrigues  MRN#:  [id number]  :  1965  Study Date:  3/15/2024  Referring Provider:  Khadijah Archer    Indications for Polysomnography:  The patient is a 59 year old Male who is 5' 10" and weighs 222.0 lbs.  His BMI equals 32.1.  [place] was - and Neck Circumference was -.  A diagnostic polysomnogram was performed to evaluate for -.  After 135.5 minutes of sleep time the patient exhibited sufficient respiratory events qualifying him for a PAP trial which was then initiated.     Polysomnogram Data:  A full night polysomnogram was performed recording the standard physiologic parameters including EEG, EOG, EMG, EKG, nasal and oral airflow.  Respiratory parameters of chest and abdominal movements are recorded with (RIP) Respiratory Inductance Plethsmography.  Oxygen saturation was recorded by pulse oximetry.    Sleep Architecture:  The total recording time of the diagnostic portion of the study was 187.4 minutes.  The total sleep time was 135.5 minutes.  The patient spent 8.9% of total sleep time in Stage N1, 79.7% in Stage N2, 11.4% in Stages N3, and 0.0% in REM.  Sleep latency was 43.0 minutes.  REM latency was - minutes.  Sleep Efficiency was 72.3%.  Total wake time was 52.0 minutes for a total wake percentage of 1.4%.  Wake after Sleep Onset was 9.0 minutes.     At 01:01:32 AM the patient was placed on PAP treatment and was titrated at pressures ranging from 6* cm/H20 up to 9* cm/H20.  The total recording time of the treatment portion of the study was 242.1 minutes.  The total sleep time was 236.0 minutes.  During the treatment portion of the study, the patient spent 10.2% of total sleep time in Stage N1, 77.5% in Stage N2, 0.0% in Stages N3, and 12.3% in REM.  Sleep latency was 0.5 minutes.  REM latency was 16.0 minutes.  Sleep Efficiency was 97.5%.  Total wake time was 6.5 minutes for a total wake percentage " of 2.7%.  Wake after Sleep Onset was 5.5 minutes.     Respiratory Summary:  During the diagnostic portion of the study, the polysomnogram revealed a presence of 7 obstructive, 1 central, and - mixed apneas resulting in an Apnea index of 3.5 events per hour.  There were 53 hypopneas resulting in a Hypopnea index of 23.5 events per hour.  The combined Apnea/Hypopnea index was 27.0 events per hour.  There were a total of - RERA events resulting in a Respiratory Disturbance Index (RDI) of 27.0 events per hour.  Lowest oxygen saturation was 83.0% and time spent ?88% oxygen saturation was 3.4 minutes (1.9%).    During diagnostic portion [place] CO2 during sleep ranged from - to - mmHg, was greater than 50 mmHg for - minutes and greater than 55 mmHg for - minutes.  Transcutaneous CO2 during sleep ranged from - to - mmHg, was greater than 50 mmHg for - minutes and greater than 55 mmHg for - minutes.    During the treatment portion of the study, the polysomnogram revealed a presence of 6 obstructive, 2 central, and 1 mixed apneas resulting in an Apnea index of 2.3 events per hour.  There were 18 hypopneas resulting in a Hypopnea index of 4.6 events per hour.  The combined Apnea/Hypopnea index was 6.9 events per hour.  There were a total of - RERA events resulting in a Respiratory Disturbance Index (RDI) of 6.9 events per hour.  Lowest oxygen saturation was 89.0% and time spent ?88% oxygen saturation was - minutes (-).      Limb Movement Activity:  During the diagnostic portion of the study, there were - limb movements recorded.      Cardiac: single lead EKG revealed normal sinus rhythm     PAP titration:    Mask used in study: Medium N20 AirFit nasal   PAP = 7 cwp was partially effective in supine REM sleep.  PAP = 8 cwp was effective in lateral N2 sleep and largely effective in supine n2 sleep.    Oxygenation:  Hypoxemia was only observed in relation to obstructive events.  At therapeutic levels of PAP therapy, there was  "no baseline hypoxemia.    Impression:  -obstructive sleep apnea     Recommendations:    -initial settings CPAP min = 7 cwp  and CPAP max =  10 cwp.  -if the patient complains of sleep disruption, CPAP min should be adjusted to 8 cwp or higher depending on pressure tolerance  -the patient has follow up with Sleep Medicine        Abhi Harrison MD  (This Sleep Study was interpreted by a Board Certified Sleep Specialist who conducted an epoch-by-epoch review of the entire raw data recording.)  (The indication for this sleep study was reviewed and deemed appropriate by [employer]AS Practice Parameters or other reasons by a Board Certified Sleep Specialist.)    [facility]    Split-Night Report    Patient Name: Wily Rodrigues Study Date: 3/15/2024   YOB: 1965 MRN #: [id number]   Age: 59 year AMMON #: [id number]   Sex: Male Referring Provider: Khadijah Archer   Height: 5' 10" Recording Tech: Eliseo Bocanegra RPSGT   Weight: 222.0 lbs Scoring Tech: Steve Phipps RRT RPSGT   BMI: 32.1 Interpreting Physician: -   ESS: - Neck Circumference: -   Study Overview    DIAGNOSTIC TREATMENT   Lights Off: 09:53:45 PM Lights Off: 01:01:12 AM   Lights On: 01:01:12 AM Lights On: 05:03:19 AM   Time in Bed: 187.4 min. Time in Bed: 242.1 min.   Total Sleep Time: 135.5 min. Total Sleep Time: 236.0 min.   Sleep Efficiency: 72.3% Sleep Efficiency: 97.5%   Sleep Latency: 43.0 min. Sleep Latency: 0.5 min.   REM Latency from Sleep Onset: - min. REM Latency from Sleep Onset: 16.0 min.   Wake After Sleep Onset: 9.0 min. Wake After Sleep Onset: 5.5 min.     DIAGNOSTIC TREATMENT    Count Index  Count Index   Awakenings: 4 1.8 Awakenings: 6 1.5   Arousals: 46 20.4 Arousals: 39 9.9   Apneas & Hypopneas: 61 27.0 Apneas & Hypopneas: 27 6.9    Limb Movements: - - Limb Movements: - -   Snores: - - Snores: - -   Desaturations: 75 33.2 Desaturations: 40 10.2   Minimum SpO2 TST: 83.0% Minimum SpO2 TST: 89.0%        Sleep " Architecture     DIAGNOSTIC TREATMENT ENTIRE NIGHT   Stages Time (min) % TST Time (min) % TST Time (min) % TST   WAKE 52.0  6.5  58.5    Stage N1 12.0 8.9% 24.0 10.2% 36.0 9.7%   Stage N2 108.0 79.7% 183.0 77.5% 291.0 78.3%   Stage N3 15.5 11.4% 0.0 0.0% 15.5 4.2%   REM 0.0 0.0% 29.0 12.3% 29.0 7.8%       Arousal Summary     DIAGNOSTIC TREATMENT    NREM REM TST Index NREM REM TST Index   Respiratory Arousals 26 - 26 11.5 9 - 9 2.3   PLM Arousals - - - - - - - -   Isolated LM Arousals - - - - - - - -   Spontaneous Arousals 20 - 20 8.9 25 5 30 7.6   Total 46 - 46 20.4 34 5 39 9.9   Respiratory Summary    DIAGNOSTIC By Sleep Stage By Body Position Total    NREM REM Supine Non-Supine    Time (min) 135.5 0.0 25.0 110.5 135.5           Obstructive Apnea 7 - - 7 7   Mixed Apnea - - - - -   Central Apnea 1 - 1 - 1   Central Apnea Index 0.4 - 2.4 - 0.4   Total Apneas 8 - 1 7 8   Total Apnea Index 3.5 - 2.4 3.8 3.5           Total Hypopnea 53 - 10 43 53   Total Hypopnea Index 23.5 - 24.0 23.3 23.5           Apnea & Hypopnea 61 - 11 50 61   Apnea & Hypopnea Index 27.0 - 26.4 27.1 27.0           RERAs - - - - -   RERA Index - - - - -           RDI 27.0 - 26.4 27.1 27.0     TREATMENT By Sleep Stage By Body Position Total    NREM REM Supine Non-Supine    Time (min) 207.0 29.0 177.5 58.5 236.0           Obstructive Apnea 6 - 5 1 6   Mixed Apnea 1 - - 1 1   Central Apnea 2 - 2 - 2   Central Apnea Index 0.6 - 0.8 - 0.6   Total Apneas 9 - 7 2 9   Total Apnea Index 2.6 - 2.4 2.1 2.3           Total Hypopnea 12 6 16 2 18   Total Hypopnea Index 3.5 12.4 5.4 2.1 4.6           Apnea & Hypopnea 21 6 23 4 27   Apnea & Hypopnea Index 6.1 12.4 7.8 4.1 6.9           RERAs - - - - -   RERA Index - - - - -           RDI 6.1 12.4 7.8 4.1 6.9     Scoring Criteria: Hypopneas scored at 3% desaturation criteria.    Respiratory Event Durations     DIAGNOSTIC TREATMENT   Apnea NREM REM NREM REM   Average (seconds) 12.9 - 18.8 -   Maximum (seconds) 16.9  - 26.7 -   Hypopnea       Average (seconds) 17.1 - 17.4 18.4   Maximum (seconds) 33.2 - 32.2 33.4   Oxygen Saturation Summary     DIAGNOSTIC TREATMENT    Wake NREM REM TST Wake NREM REM TST   Average SpO2 94.6% 93.2% - 93.2% 96.2% 95.6% 95.3% 95.5%   Minimum SpO2 86.0% 83.0% - 83.0%  90.0% 89.0% 92.0% 89.0%    Maximum SpO2 98.0% 98.0% - 98.0%  99.0% 98.0% 98.0% 98.0%     DIAGNOSTIC   Oxygen Saturation Distribution    Range (%) Time in range (min) Time in range (%)   90.0 - 100.0 127.8 94.3%   80.0 - 90.0 7.7 5.7%   70.0 - 80.0 - -   60.0 - 70.0 - -   50.0 - 60.0 - -   0.0 - 50.0 - -   Time Spent ?88% SpO2    Range (%) Time in range (min) Time in range (%)   0.0 - 88.0 3.1 2.3%          Count Index   Desaturations 75 33.2      TREATMENT   Oxygen Saturation Distribution    Range (%) Time in range (min) Time in range (%)   90.0 - 100.0 241.0 99.6%   80.0 - 90.0 1.0 0.4%   70.0 - 80.0 - -   60.0 - 70.0 - -   50.0 - 60.0 - -   0.0 - 50.0 - -   Time Spent ?88% SpO2    Range (%) Time in range (min) Time in range (%)   0.0 - 88.0 - -      Count Index   Desaturations 40 10.2      Limb Movement Summary     DIAGNOSTIC TREATMENT    Count Index Count Index   Isolated Limb Movements - - - -   Periodic Limb Movements (PLMs) - - - -   Total Limb Movements - - - -     Cardiac Summary     DIAGNOSTIC TREATMENT    Wake NREM REM Total Wake NREM REM Total   Average FL (BPM) 72.0 65.8 - 67.4 62.9 61.6 63.5 61.8   Minimum FL (BPM) 62.0 58.0 - 58.0 55.0 55.0 56.0 55.0   Maximum FL (BPM) 81.0 77.0 - 81.0 74.0 87.0 70.0 87.0         Diagnostic EtCO2    Stage Min (mmHg) Average (mmHg) Max (mmHg)   Wake - - -   NREM(1+2+3) - - -   REM - - -       Range (mmHg) Time in range (min) Time in range (%)   - - -   - - -   - - -   - - -   - - -       TcCO2 Summary    DIAGNOSTIC    Stage Min (mmHg) Average (mmHg) Max (mmHg)   Wake - - -   NREM(1+2+3) - - -   REM - - -       Range (mmHg) Time in range (min) Time in range (%)   20.0 - 40.0 - -   40.0 -  50.0 - -   50.0 - 55.0 - -   55.0 - 100.0 - -   Excluded data <20.0 & >65.0 187.5 100.0%       TREATMENT    Stage Min (mmHg) Average (mmHg) Max (mmHg)   Wake - - -   NREM(1+2+3) - - -   REM - - -       Range (mmHg) Time in range (min) Time in range (%)   20.0 - 40.0 - -   40.0 - 50.0 - -   50.0 - 55.0 - -   55.0 - 100.0 - -   Excluded data <20.0 & >65.0 242.5 100.0%       Comments    -      Titration Summary    PAP Device PAP Level O2 Level Time (min) TST (min) NREM (min) REM (min) Wake (min) Sleep Eff% OA# CA# MA# Hyp# AHI RERA RDI Min SpO2 SpO2 ?88% (min) Ar. Index   - Off - 187.5 135.5 135.5 0.0 52.0 72.3% 7 1 - 53 27.0 - 27.0 83.0  3.1 20.4   CPAP 6 - 10.0 9.0 9.0 0.0 1.0 90.0% 2 2 - 5 60.0 - 60.0 89.0  0.0 46.7   CPAP 7 - 31.0 30.5 10.0 20.5 0.5 98.4% - - - 5 9.8 - 9.8 89.0  0.0 7.9   CPAP 8 - 181.5 177.5 169.0 8.5 4.0 97.8% 4 - 1 7 4.1 - 4.1 90.0  0.0 8.5   CPAP 9 - 20.0 19.0 19.0 0.0 1.0 95.0% - - - 1 3.2 - 3.2 94.0  0.0 9.5

## 2024-03-23 NOTE — TELEPHONE ENCOUNTER
Refill Decision Note   Wily Rodrigues  is requesting a refill authorization.  Brief Assessment and Rationale for Refill:  Approve     Medication Therapy Plan:         Comments:     Note composed:4:16 PM 03/23/2024

## 2024-03-24 ENCOUNTER — PATIENT MESSAGE (OUTPATIENT)
Dept: SLEEP MEDICINE | Facility: CLINIC | Age: 59
End: 2024-03-24

## 2024-03-24 PROCEDURE — 95811 POLYSOM 6/>YRS CPAP 4/> PARM: CPT | Mod: 26,,, | Performed by: INTERNAL MEDICINE

## 2024-03-25 ENCOUNTER — PATIENT MESSAGE (OUTPATIENT)
Dept: ADMINISTRATIVE | Facility: HOSPITAL | Age: 59
End: 2024-03-25
Payer: COMMERCIAL

## 2024-03-25 ENCOUNTER — PATIENT OUTREACH (OUTPATIENT)
Dept: ADMINISTRATIVE | Facility: HOSPITAL | Age: 59
End: 2024-03-25
Payer: COMMERCIAL

## 2024-03-25 DIAGNOSIS — E11.9 TYPE 2 DIABETES MELLITUS WITHOUT COMPLICATION, UNSPECIFIED WHETHER LONG TERM INSULIN USE: Primary | ICD-10-CM

## 2024-03-25 DIAGNOSIS — G47.33 OSA (OBSTRUCTIVE SLEEP APNEA): Primary | ICD-10-CM

## 2024-03-26 ENCOUNTER — PATIENT MESSAGE (OUTPATIENT)
Dept: SLEEP MEDICINE | Facility: CLINIC | Age: 59
End: 2024-03-26
Payer: COMMERCIAL

## 2024-04-02 DIAGNOSIS — G47.00 INSOMNIA, UNSPECIFIED TYPE: ICD-10-CM

## 2024-04-02 RX ORDER — ZOLPIDEM TARTRATE 5 MG/1
5 TABLET ORAL NIGHTLY PRN
Qty: 30 TABLET | Refills: 5 | Status: SHIPPED | OUTPATIENT
Start: 2024-04-02 | End: 2024-09-29

## 2024-04-21 ENCOUNTER — PATIENT MESSAGE (OUTPATIENT)
Dept: FAMILY MEDICINE | Facility: CLINIC | Age: 59
End: 2024-04-21
Payer: COMMERCIAL

## 2024-04-23 ENCOUNTER — LAB VISIT (OUTPATIENT)
Dept: LAB | Facility: HOSPITAL | Age: 59
End: 2024-04-23
Attending: FAMILY MEDICINE
Payer: COMMERCIAL

## 2024-04-23 ENCOUNTER — OFFICE VISIT (OUTPATIENT)
Dept: FAMILY MEDICINE | Facility: CLINIC | Age: 59
End: 2024-04-23
Attending: FAMILY MEDICINE
Payer: COMMERCIAL

## 2024-04-23 VITALS
OXYGEN SATURATION: 99 % | WEIGHT: 224 LBS | DIASTOLIC BLOOD PRESSURE: 85 MMHG | BODY MASS INDEX: 32.14 KG/M2 | HEART RATE: 82 BPM | SYSTOLIC BLOOD PRESSURE: 138 MMHG

## 2024-04-23 DIAGNOSIS — I10 ESSENTIAL HYPERTENSION: ICD-10-CM

## 2024-04-23 DIAGNOSIS — E78.2 MIXED HYPERLIPIDEMIA: ICD-10-CM

## 2024-04-23 DIAGNOSIS — E66.9 DIABETES MELLITUS TYPE 2 IN OBESE: Primary | ICD-10-CM

## 2024-04-23 DIAGNOSIS — E11.69 DIABETES MELLITUS TYPE 2 IN OBESE: ICD-10-CM

## 2024-04-23 DIAGNOSIS — E66.9 DIABETES MELLITUS TYPE 2 IN OBESE: ICD-10-CM

## 2024-04-23 DIAGNOSIS — E11.69 DIABETES MELLITUS TYPE 2 IN OBESE: Primary | ICD-10-CM

## 2024-04-23 LAB
ALBUMIN SERPL BCP-MCNC: 3.9 G/DL (ref 3.5–5.2)
ALP SERPL-CCNC: 111 U/L (ref 55–135)
ALT SERPL W/O P-5'-P-CCNC: 42 U/L (ref 10–44)
ANION GAP SERPL CALC-SCNC: 8 MMOL/L (ref 8–16)
AST SERPL-CCNC: 23 U/L (ref 10–40)
BILIRUB SERPL-MCNC: 0.4 MG/DL (ref 0.1–1)
BUN SERPL-MCNC: 13 MG/DL (ref 6–20)
CALCIUM SERPL-MCNC: 9.7 MG/DL (ref 8.7–10.5)
CHLORIDE SERPL-SCNC: 104 MMOL/L (ref 95–110)
CHOLEST SERPL-MCNC: 227 MG/DL (ref 120–199)
CHOLEST/HDLC SERPL: 6.7 {RATIO} (ref 2–5)
CO2 SERPL-SCNC: 26 MMOL/L (ref 23–29)
CREAT SERPL-MCNC: 0.9 MG/DL (ref 0.5–1.4)
EST. GFR  (NO RACE VARIABLE): >60 ML/MIN/1.73 M^2
ESTIMATED AVG GLUCOSE: 223 MG/DL (ref 68–131)
GLUCOSE SERPL-MCNC: 298 MG/DL (ref 70–110)
HBA1C MFR BLD: 9.4 % (ref 4–5.6)
HDLC SERPL-MCNC: 34 MG/DL (ref 40–75)
HDLC SERPL: 15 % (ref 20–50)
LDLC SERPL CALC-MCNC: 121.2 MG/DL (ref 63–159)
NONHDLC SERPL-MCNC: 193 MG/DL
POTASSIUM SERPL-SCNC: 4.4 MMOL/L (ref 3.5–5.1)
PROT SERPL-MCNC: 7.1 G/DL (ref 6–8.4)
SODIUM SERPL-SCNC: 138 MMOL/L (ref 136–145)
TRIGL SERPL-MCNC: 359 MG/DL (ref 30–150)

## 2024-04-23 PROCEDURE — 99999 PR PBB SHADOW E&M-EST. PATIENT-LVL IV: CPT | Mod: PBBFAC,,, | Performed by: FAMILY MEDICINE

## 2024-04-23 PROCEDURE — 80061 LIPID PANEL: CPT | Performed by: FAMILY MEDICINE

## 2024-04-23 PROCEDURE — 1160F RVW MEDS BY RX/DR IN RCRD: CPT | Mod: CPTII,S$GLB,, | Performed by: FAMILY MEDICINE

## 2024-04-23 PROCEDURE — 1159F MED LIST DOCD IN RCRD: CPT | Mod: CPTII,S$GLB,, | Performed by: FAMILY MEDICINE

## 2024-04-23 PROCEDURE — 3008F BODY MASS INDEX DOCD: CPT | Mod: CPTII,S$GLB,, | Performed by: FAMILY MEDICINE

## 2024-04-23 PROCEDURE — 83036 HEMOGLOBIN GLYCOSYLATED A1C: CPT | Performed by: FAMILY MEDICINE

## 2024-04-23 PROCEDURE — 4010F ACE/ARB THERAPY RXD/TAKEN: CPT | Mod: CPTII,S$GLB,, | Performed by: FAMILY MEDICINE

## 2024-04-23 PROCEDURE — 3079F DIAST BP 80-89 MM HG: CPT | Mod: CPTII,S$GLB,, | Performed by: FAMILY MEDICINE

## 2024-04-23 PROCEDURE — 3052F HG A1C>EQUAL 8.0%<EQUAL 9.0%: CPT | Mod: CPTII,S$GLB,, | Performed by: FAMILY MEDICINE

## 2024-04-23 PROCEDURE — 80053 COMPREHEN METABOLIC PANEL: CPT | Performed by: FAMILY MEDICINE

## 2024-04-23 PROCEDURE — 36415 COLL VENOUS BLD VENIPUNCTURE: CPT | Mod: PO | Performed by: FAMILY MEDICINE

## 2024-04-23 PROCEDURE — 3075F SYST BP GE 130 - 139MM HG: CPT | Mod: CPTII,S$GLB,, | Performed by: FAMILY MEDICINE

## 2024-04-23 PROCEDURE — 3066F NEPHROPATHY DOC TX: CPT | Mod: CPTII,S$GLB,, | Performed by: FAMILY MEDICINE

## 2024-04-23 PROCEDURE — 3061F NEG MICROALBUMINURIA REV: CPT | Mod: CPTII,S$GLB,, | Performed by: FAMILY MEDICINE

## 2024-04-23 PROCEDURE — 99214 OFFICE O/P EST MOD 30 MIN: CPT | Mod: S$GLB,,, | Performed by: FAMILY MEDICINE

## 2024-04-23 NOTE — PROGRESS NOTES
"Subjective:       Patient ID: Wily Rodrigues is a 59 y.o. male.    Chief Complaint: Diabetes    Diabetes  Pertinent negatives for diabetes include no chest pain, no fatigue, no polydipsia and no polyuria.     Pt is here for follow up of dm stable on actos and amaryl but is having trouble getting his mounjaro no polys  Pt has htn bp fine on repeat stress but he is able to calm down  Pt has hypercholesterolemia on statin no muscle aches   Review of Systems   Constitutional:  Negative for chills, fatigue and fever.   Respiratory:  Negative for cough, chest tightness and shortness of breath.    Cardiovascular:  Negative for chest pain and palpitations.   Gastrointestinal:  Negative for abdominal distention, abdominal pain and blood in stool.   Endocrine: Negative for polydipsia and polyuria.       Objective:    /85   Pulse 82   Wt 101.6 kg (224 lb)   SpO2 99%   BMI 32.14 kg/m²     Physical Exam  Constitutional:       Appearance: He is obese. He is not ill-appearing.   Cardiovascular:      Rate and Rhythm: Normal rate and regular rhythm.      Heart sounds:      No gallop.   Pulmonary:      Effort: Pulmonary effort is normal. No respiratory distress.   Neurological:      General: No focal deficit present.      Mental Status: He is alert and oriented to person, place, and time.      Cranial Nerves: No cranial nerve deficit.      Coordination: Coordination normal.       Hgb A1c 8.5 in 1/2024  Assessment:       1. Diabetes mellitus type 2 in obese    2. Essential hypertension    3. Mixed hyperlipidemia        Plan:     Orders cmp lipid hgb A1c  Cont meds  Ada diet  Restart mounjaro sent to [facility]   Graded exercise  Rtc quarterly       "This note will not be shared with the patient."     "

## 2024-05-02 ENCOUNTER — TELEPHONE (OUTPATIENT)
Dept: FAMILY MEDICINE | Facility: CLINIC | Age: 59
End: 2024-05-02
Payer: COMMERCIAL

## 2024-05-02 NOTE — TELEPHONE ENCOUNTER
----- Message from Areli Kramer MD sent at 5/2/2024  2:26 PM CDT -----  Regarding: dm f/u  Please help pt set up a virtual results follow up fbs log and meds available  ----- Message -----  From: Greg, Soft [employer] Interface  Sent: 4/23/2024  12:19 PM CDT  To: Areli Kramer MD

## 2024-05-05 ENCOUNTER — OFFICE VISIT (OUTPATIENT)
Dept: URGENT CARE | Facility: CLINIC | Age: 59
End: 2024-05-05
Payer: COMMERCIAL

## 2024-05-05 VITALS
BODY MASS INDEX: 32.07 KG/M2 | RESPIRATION RATE: 16 BRPM | SYSTOLIC BLOOD PRESSURE: 149 MMHG | WEIGHT: 224 LBS | HEIGHT: 70 IN | HEART RATE: 87 BPM | DIASTOLIC BLOOD PRESSURE: 95 MMHG | OXYGEN SATURATION: 98 % | TEMPERATURE: 99 F

## 2024-05-05 DIAGNOSIS — J44.1 COPD EXACERBATION: Primary | ICD-10-CM

## 2024-05-05 DIAGNOSIS — R05.9 COUGH, UNSPECIFIED TYPE: ICD-10-CM

## 2024-05-05 DIAGNOSIS — J44.1 COPD EXACERBATION: ICD-10-CM

## 2024-05-05 DIAGNOSIS — H92.03 OTALGIA OF BOTH EARS: ICD-10-CM

## 2024-05-05 PROCEDURE — 99213 OFFICE O/P EST LOW 20 MIN: CPT | Mod: 25,S$GLB,, | Performed by: NURSE PRACTITIONER

## 2024-05-05 PROCEDURE — 94640 AIRWAY INHALATION TREATMENT: CPT | Mod: 59,S$GLB,, | Performed by: NURSE PRACTITIONER

## 2024-05-05 RX ORDER — ALBUTEROL SULFATE 0.83 MG/ML
2.5 SOLUTION RESPIRATORY (INHALATION)
Status: COMPLETED | OUTPATIENT
Start: 2024-05-05 | End: 2024-05-05

## 2024-05-05 RX ORDER — FLUTICASONE PROPIONATE 50 MCG
2 SPRAY, SUSPENSION (ML) NASAL
Qty: 48 ML | Refills: 1 | Status: SHIPPED | OUTPATIENT
Start: 2024-05-05

## 2024-05-05 RX ORDER — BENZONATATE 100 MG/1
100 CAPSULE ORAL 3 TIMES DAILY PRN
Qty: 30 CAPSULE | Refills: 0 | Status: SHIPPED | OUTPATIENT
Start: 2024-05-05 | End: 2024-05-15

## 2024-05-05 RX ORDER — PROMETHAZINE HYDROCHLORIDE AND DEXTROMETHORPHAN HYDROBROMIDE 6.25; 15 MG/5ML; MG/5ML
5 SYRUP ORAL EVERY 4 HOURS PRN
Qty: 240 ML | Refills: 0 | Status: SHIPPED | OUTPATIENT
Start: 2024-05-05 | End: 2024-05-15

## 2024-05-05 RX ORDER — IPRATROPIUM BROMIDE 0.5 MG/2.5ML
0.5 SOLUTION RESPIRATORY (INHALATION)
Status: COMPLETED | OUTPATIENT
Start: 2024-05-05 | End: 2024-05-05

## 2024-05-05 RX ORDER — FLUTICASONE PROPIONATE 50 MCG
2 SPRAY, SUSPENSION (ML) NASAL DAILY
Qty: 11.1 ML | Refills: 0 | Status: SHIPPED | OUTPATIENT
Start: 2024-05-05 | End: 2024-05-05

## 2024-05-05 RX ORDER — PREDNISONE 20 MG/1
40 TABLET ORAL DAILY
Qty: 10 TABLET | Refills: 0 | Status: SHIPPED | OUTPATIENT
Start: 2024-05-05 | End: 2024-05-10

## 2024-05-05 RX ORDER — LEVOCETIRIZINE DIHYDROCHLORIDE 5 MG/1
5 TABLET, FILM COATED ORAL NIGHTLY
Qty: 30 TABLET | Refills: 0 | Status: SHIPPED | OUTPATIENT
Start: 2024-05-05 | End: 2024-06-04

## 2024-05-05 RX ADMIN — ALBUTEROL SULFATE 2.5 MG: 0.83 SOLUTION RESPIRATORY (INHALATION) at 02:05

## 2024-05-05 RX ADMIN — IPRATROPIUM BROMIDE 0.5 MG: 0.5 SOLUTION RESPIRATORY (INHALATION) at 02:05

## 2024-05-05 NOTE — PROGRESS NOTES
"Subjective:      Patient ID: Wily Rodrigues is a 59 y.o. male.    Vitals:  height is 5' 10" (1.778 m) and weight is 101.6 kg (224 lb). His oral temperature is 99.1 °F (37.3 °C). His blood pressure is 149/95 (abnormal) and his pulse is 87. His respiration is 16 and oxygen saturation is 98%.     Chief Complaint: Asthma    Pt with HX of COPD reports that he felt like his ears "closed up" 2 weeks ago, and then he developed a dry persistent cough and lost his voice, reports similar symptoms last year with COPD exacerbation.  Reports no improvement with Mucinex Cold & Flu but that albuterol rescue inhaler and benzonatate have been somewhat helpful.  Denies fever/chills.  Denies n/v/d, denies abd pain.  Denies chest pain, but reports intermittent SOB and rattling sensation to chest with coughing.    Asthma  He complains of cough, frequent throat clearing and hoarse voice. There is no chest tightness, difficulty breathing, hemoptysis, shortness of breath, sputum production or wheezing. This is a new problem. The current episode started 1 to 4 weeks ago. The problem has been gradually worsening. The cough is non-productive and dry. Associated symptoms include ear congestion, ear pain, nasal congestion, postnasal drip and a sore throat. Pertinent negatives include no appetite change, chest pain, dyspnea on exertion, fever, headaches, heartburn, malaise/fatigue, myalgias, orthopnea, PND, rhinorrhea, sneezing, sweats, trouble swallowing or weight loss. His symptoms are aggravated by change in weather and pollen. His past medical history is significant for asthma, bronchitis and pneumonia. There is no history of bronchiectasis, COPD or emphysema.       Constitution: Negative for appetite change and fever.   HENT:  Positive for ear pain, congestion, postnasal drip and sore throat. Negative for ear discharge, foreign body in ear, tinnitus, hearing loss and trouble swallowing.    Cardiovascular:  Negative for chest pain and sob " on exertion.   Respiratory:  Positive for cough and COPD. Negative for sputum production, bloody sputum, shortness of breath and wheezing.    Gastrointestinal:  Negative for abdominal pain, nausea, vomiting, diarrhea and heartburn.   Musculoskeletal:  Negative for muscle ache.   Allergic/Immunologic: Negative for sneezing.   Neurological:  Negative for headaches.      Objective:     Physical Exam   Constitutional: He is oriented to person, place, and time. He appears well-developed. He is cooperative.  Non-toxic appearance. He does not appear ill. No distress.   HENT:   Head: Normocephalic and atraumatic.   Ears:   Right Ear: Hearing, external ear and ear canal normal. Tympanic membrane is bulging. Tympanic membrane is not erythematous and not retracted. A middle ear effusion (clear fluid) is present.   Left Ear: Hearing, external ear and ear canal normal. Tympanic membrane is bulging. Tympanic membrane is not erythematous and not retracted. A middle ear effusion (clear fluid) is present.   Nose: Nose normal. No mucosal edema, rhinorrhea, purulent discharge or nasal deformity. No epistaxis. Right sinus exhibits no maxillary sinus tenderness and no frontal sinus tenderness. Left sinus exhibits no maxillary sinus tenderness and no frontal sinus tenderness.   Mouth/Throat: Uvula is midline and mucous membranes are normal. No trismus in the jaw. Normal dentition. No uvula swelling. Oropharyngeal exudate (clear postnasal drip), posterior oropharyngeal erythema and cobblestoning present. No posterior oropharyngeal edema. Tonsils are 1+ on the right. Tonsils are 1+ on the left. No tonsillar exudate.   Eyes: Conjunctivae and lids are normal. No scleral icterus.   Neck: Trachea normal and phonation normal. Neck supple. No edema present. No erythema present. No neck rigidity present.   Cardiovascular: Normal rate, regular rhythm, normal heart sounds and normal pulses.   Pulmonary/Chest: Effort normal. No accessory muscle  usage or stridor. No tachypnea. No respiratory distress. He has no decreased breath sounds. He has no wheezes. He has rhonchi (not cleared with coughing) in the right lower field and the left lower field. He has no rales.   Abdominal: Normal appearance.   Musculoskeletal: Normal range of motion.         General: No deformity. Normal range of motion.   Lymphadenopathy:        Head (right side): No submandibular adenopathy present.        Head (left side): No submandibular adenopathy present.     He has no cervical adenopathy.   Neurological: He is alert and oriented to person, place, and time. He exhibits normal muscle tone. Coordination normal.   Skin: Skin is warm, dry, intact, not diaphoretic and not pale.   Psychiatric: His speech is normal and behavior is normal. Judgment and thought content normal.   Nursing note and vitals reviewed.      Assessment:     1. COPD exacerbation    2. Cough, unspecified type    3. Otalgia of both ears        Plan:     After Duo-Neb TX:  Pt reports feeling much better.  Chest expansion improved.  SpO2 96%.    Provided education on prescribed medications.  Provided education on return/ER precautions.  Pt verbalized understanding and agreed to plan.      COPD exacerbation  -     albuterol nebulizer solution 2.5 mg  -     ipratropium 0.02 % nebulizer solution 0.5 mg  -     predniSONE (DELTASONE) 20 MG tablet; Take 2 tablets (40 mg total) by mouth once daily. for 5 days  Dispense: 10 tablet; Refill: 0  -     levocetirizine (XYZAL) 5 MG tablet; Take 1 tablet (5 mg total) by mouth every evening.  Dispense: 30 tablet; Refill: 0  -     fluticasone propionate (FLONASE) 50 mcg/actuation nasal spray; 2 sprays (100 mcg total) by Each Nostril route once daily.  Dispense: 11.1 mL; Refill: 0  -     benzonatate (TESSALON) 100 MG capsule; Take 1 capsule (100 mg total) by mouth 3 (three) times daily as needed for Cough.  Dispense: 30 capsule; Refill: 0  -     promethazine-dextromethorphan  "(PROMETHAZINE-DM) 6.25-15 mg/5 mL Syrp; Take 5 mLs by mouth every 4 (four) hours as needed (cough).  Dispense: 240 mL; Refill: 0    Cough, unspecified type    Otalgia of both ears      Patient Instructions   If your condition worsens or fails to improve, we recommend that you receive another evaluation at the ER immediately contact your PCP to discuss your concerns, or return here.  You must understand that you've received an urgent care treatment only, and that you may be released before all your medical problems are known or treated.  You, the patient, will arrange for follow-up care as instructed.     If we discussed that I think your illness is viral, it will not respond to antibiotics and will last 10-14 days.  If we discussed "wait and see" antibiotics, and if over the next few days the symptoms worsen, start the antibiotics I have given you.     If you are female and on birth control pills and do take the antibiotics, use additional methods to prevent pregnancy while on the antibiotics and for one cycle after.     Flonase (fluticasone) is a nasal spray which is available over the counter and may help with your symptoms.  Zyrtec D, Claritin D, or Allegra D can also help with symptoms of congestion and drainage.  If you have hypertension, avoid using the "D" which is the decongestant formula.    If you just have drainage, you can take plain Zyrtec, Claritin, or Allegra.  If you just have a congested feeling, you can take pseudoephedrine (unless you have high blood pressure), which you have to sign for behind the counter.  Do not buy phenylephrine OTC, as it is not effective.    Rest and fluids are also important.  Tylenol or ibuprofen can also be used as directed for pain, unless you have an allergy to them or medical condition (such as stomach ulcers, kidney or liver disease, or use blood thinners, etc.) for which you should not be taking these type of medications.     If you are flying in the next few days, " Afrin nose drops for the airplane flight upon take off and landing may help.  Other than at those times, refrain from using Afrin.     If you were prescribed a narcotic or sedating cough medicine, do not drive or operate heavy machinery while taking these medications.                Additional MDM:     Heart Failure Score:   COPD = Yes

## 2024-05-15 ENCOUNTER — PATIENT MESSAGE (OUTPATIENT)
Dept: FAMILY MEDICINE | Facility: CLINIC | Age: 59
End: 2024-05-15
Payer: COMMERCIAL

## 2024-06-05 DIAGNOSIS — E11.9 TYPE 2 DIABETES MELLITUS WITHOUT COMPLICATION, UNSPECIFIED WHETHER LONG TERM INSULIN USE: ICD-10-CM

## 2024-06-29 RX ORDER — METHYLPHENIDATE HYDROCHLORIDE 20 MG/1
20 TABLET ORAL DAILY
Qty: 90 TABLET | Refills: 0 | Status: SHIPPED | OUTPATIENT
Start: 2024-06-29

## 2024-06-29 RX ORDER — DULOXETIN HYDROCHLORIDE 60 MG/1
60 CAPSULE, DELAYED RELEASE ORAL DAILY
Qty: 90 CAPSULE | Refills: 0 | Status: SHIPPED | OUTPATIENT
Start: 2024-06-29

## 2024-06-29 RX ORDER — METHYLPHENIDATE HYDROCHLORIDE 36 MG/1
36 TABLET ORAL DAILY
Qty: 90 TABLET | Refills: 0 | Status: SHIPPED | OUTPATIENT
Start: 2024-06-29

## 2024-07-02 RX ORDER — VALACYCLOVIR HYDROCHLORIDE 1 G/1
TABLET, FILM COATED ORAL
Qty: 180 TABLET | Refills: 1 | Status: SHIPPED | OUTPATIENT
Start: 2024-07-02

## 2024-07-02 NOTE — TELEPHONE ENCOUNTER
No care due was identified.  [facility] Embedded Care Due Messages. Reference number: [id number].   7/02/2024 5:36:28 PM CDT

## 2024-07-02 NOTE — TELEPHONE ENCOUNTER
Refill Decision Note   Wily Rodrigues  is requesting a refill authorization.  Brief Assessment and Rationale for Refill:  Approve     Medication Therapy Plan:        Comments:     Note composed:6:13 PM 07/02/2024

## 2024-07-31 DIAGNOSIS — E11.9 TYPE 2 DIABETES MELLITUS WITHOUT COMPLICATION: ICD-10-CM

## 2024-08-09 RX ORDER — METHYLPHENIDATE HYDROCHLORIDE 36 MG/1
36 TABLET ORAL DAILY
Qty: 90 TABLET | Refills: 0 | Status: SHIPPED | OUTPATIENT
Start: 2024-08-09

## 2024-08-09 RX ORDER — METHYLPHENIDATE HYDROCHLORIDE 20 MG/1
20 TABLET ORAL DAILY
Qty: 90 TABLET | Refills: 0 | Status: SHIPPED | OUTPATIENT
Start: 2024-08-09

## 2024-09-14 RX ORDER — METHYLPHENIDATE HYDROCHLORIDE 36 MG/1
36 TABLET ORAL DAILY
Qty: 90 TABLET | Refills: 0 | Status: SHIPPED | OUTPATIENT
Start: 2024-09-14

## 2024-09-14 RX ORDER — METHYLPHENIDATE HYDROCHLORIDE 20 MG/1
20 TABLET ORAL DAILY
Qty: 90 TABLET | Refills: 0 | Status: SHIPPED | OUTPATIENT
Start: 2024-09-14

## 2024-09-15 NOTE — TELEPHONE ENCOUNTER
No care due was identified.  [facility] Embedded Care Due Messages. Reference number: [id number].   9/15/2024 10:11:33 AM CDT

## 2024-09-16 RX ORDER — METFORMIN HYDROCHLORIDE 500 MG/1
500 TABLET, EXTENDED RELEASE ORAL
Qty: 90 TABLET | Refills: 0 | Status: SHIPPED | OUTPATIENT
Start: 2024-09-16

## 2024-09-16 RX ORDER — ATORVASTATIN CALCIUM 20 MG/1
TABLET, FILM COATED ORAL
Qty: 90 TABLET | Refills: 2 | Status: SHIPPED | OUTPATIENT
Start: 2024-09-16

## 2024-09-16 NOTE — TELEPHONE ENCOUNTER
Refill Decision Note   Wily Rodrigues  is requesting a refill authorization.  Brief Assessment and Rationale for Refill:  Approve     Medication Therapy Plan:         Comments:     Note composed:4:19 PM 09/16/2024

## 2024-09-24 ENCOUNTER — PATIENT OUTREACH (OUTPATIENT)
Dept: ADMINISTRATIVE | Facility: HOSPITAL | Age: 59
End: 2024-09-24
Payer: COMMERCIAL

## 2024-09-27 ENCOUNTER — LAB VISIT (OUTPATIENT)
Dept: LAB | Facility: HOSPITAL | Age: 59
End: 2024-09-27
Attending: FAMILY MEDICINE
Payer: COMMERCIAL

## 2024-09-27 DIAGNOSIS — E11.9 TYPE 2 DIABETES MELLITUS WITHOUT COMPLICATION: ICD-10-CM

## 2024-09-27 LAB
ESTIMATED AVG GLUCOSE: 189 MG/DL (ref 68–131)
HBA1C MFR BLD: 8.2 % (ref 4–5.6)

## 2024-09-27 PROCEDURE — 36415 COLL VENOUS BLD VENIPUNCTURE: CPT | Mod: PO | Performed by: FAMILY MEDICINE

## 2024-09-27 PROCEDURE — 83036 HEMOGLOBIN GLYCOSYLATED A1C: CPT | Performed by: FAMILY MEDICINE

## 2024-09-30 ENCOUNTER — PATIENT MESSAGE (OUTPATIENT)
Dept: FAMILY MEDICINE | Facility: CLINIC | Age: 59
End: 2024-09-30
Payer: COMMERCIAL

## 2024-09-30 ENCOUNTER — PATIENT MESSAGE (OUTPATIENT)
Dept: ADMINISTRATIVE | Facility: HOSPITAL | Age: 59
End: 2024-09-30
Payer: COMMERCIAL

## 2024-10-01 DIAGNOSIS — Z12.11 SCREENING FOR COLON CANCER: ICD-10-CM

## 2024-10-19 RX ORDER — METHYLPHENIDATE HYDROCHLORIDE 36 MG/1
36 TABLET ORAL DAILY
Qty: 90 TABLET | Refills: 0 | Status: SHIPPED | OUTPATIENT
Start: 2024-10-19

## 2024-10-19 RX ORDER — METHYLPHENIDATE HYDROCHLORIDE 20 MG/1
20 TABLET ORAL DAILY
Qty: 90 TABLET | Refills: 0 | Status: SHIPPED | OUTPATIENT
Start: 2024-10-19

## 2024-10-26 ENCOUNTER — PATIENT MESSAGE (OUTPATIENT)
Dept: FAMILY MEDICINE | Facility: CLINIC | Age: 59
End: 2024-10-26
Payer: COMMERCIAL

## 2024-11-18 ENCOUNTER — PATIENT OUTREACH (OUTPATIENT)
Dept: ADMINISTRATIVE | Facility: HOSPITAL | Age: 59
End: 2024-11-18
Payer: COMMERCIAL

## 2024-11-18 NOTE — PROGRESS NOTES
Health Maintenance reviewed, updated and links triggered. Eye and Foot exam due left a voice mail for a call back   For scheduling. ([place]) 11/18/24

## 2024-11-19 NOTE — TELEPHONE ENCOUNTER
No new care gaps identified.  [facility] Embedded Care Gaps. Reference number: [id number]. 5/20/2022   7:32:51 AM TURDIT   20-Nov-2024 01:41

## 2024-11-21 ENCOUNTER — OFFICE VISIT (OUTPATIENT)
Dept: FAMILY MEDICINE | Facility: CLINIC | Age: 59
End: 2024-11-21
Attending: FAMILY MEDICINE
Payer: COMMERCIAL

## 2024-11-21 ENCOUNTER — LAB VISIT (OUTPATIENT)
Dept: LAB | Facility: HOSPITAL | Age: 59
End: 2024-11-21
Attending: FAMILY MEDICINE
Payer: COMMERCIAL

## 2024-11-21 VITALS
OXYGEN SATURATION: 99 % | DIASTOLIC BLOOD PRESSURE: 89 MMHG | SYSTOLIC BLOOD PRESSURE: 137 MMHG | HEART RATE: 77 BPM | BODY MASS INDEX: 30.13 KG/M2 | WEIGHT: 210 LBS

## 2024-11-21 DIAGNOSIS — J30.9 ALLERGIC RHINITIS, UNSPECIFIED SEASONALITY, UNSPECIFIED TRIGGER: ICD-10-CM

## 2024-11-21 DIAGNOSIS — E66.9 DIABETES MELLITUS TYPE 2 IN OBESE: Primary | ICD-10-CM

## 2024-11-21 DIAGNOSIS — E78.2 MIXED HYPERLIPIDEMIA: ICD-10-CM

## 2024-11-21 DIAGNOSIS — E03.9 ACQUIRED HYPOTHYROIDISM: ICD-10-CM

## 2024-11-21 DIAGNOSIS — E66.9 DIABETES MELLITUS TYPE 2 IN OBESE: ICD-10-CM

## 2024-11-21 DIAGNOSIS — I10 ESSENTIAL HYPERTENSION: ICD-10-CM

## 2024-11-21 DIAGNOSIS — E11.69 DIABETES MELLITUS TYPE 2 IN OBESE: Primary | ICD-10-CM

## 2024-11-21 DIAGNOSIS — E11.69 DIABETES MELLITUS TYPE 2 IN OBESE: ICD-10-CM

## 2024-11-21 LAB
ALBUMIN SERPL BCP-MCNC: 3.9 G/DL (ref 3.5–5.2)
ALP SERPL-CCNC: 100 U/L (ref 40–150)
ALT SERPL W/O P-5'-P-CCNC: 34 U/L (ref 10–44)
ANION GAP SERPL CALC-SCNC: 12 MMOL/L (ref 8–16)
AST SERPL-CCNC: 18 U/L (ref 10–40)
BILIRUB SERPL-MCNC: 0.4 MG/DL (ref 0.1–1)
BUN SERPL-MCNC: 13 MG/DL (ref 6–20)
CALCIUM SERPL-MCNC: 9.4 MG/DL (ref 8.7–10.5)
CHLORIDE SERPL-SCNC: 101 MMOL/L (ref 95–110)
CO2 SERPL-SCNC: 24 MMOL/L (ref 23–29)
CREAT SERPL-MCNC: 1 MG/DL (ref 0.5–1.4)
EST. GFR  (NO RACE VARIABLE): >60 ML/MIN/1.73 M^2
ESTIMATED AVG GLUCOSE: 206 MG/DL (ref 68–131)
GLUCOSE SERPL-MCNC: 330 MG/DL (ref 70–110)
HBA1C MFR BLD: 8.8 % (ref 4–5.6)
POTASSIUM SERPL-SCNC: 4.5 MMOL/L (ref 3.5–5.1)
PROT SERPL-MCNC: 7.1 G/DL (ref 6–8.4)
SODIUM SERPL-SCNC: 137 MMOL/L (ref 136–145)
TSH SERPL DL<=0.005 MIU/L-ACNC: 2.36 UIU/ML (ref 0.4–4)

## 2024-11-21 PROCEDURE — 83036 HEMOGLOBIN GLYCOSYLATED A1C: CPT | Performed by: FAMILY MEDICINE

## 2024-11-21 PROCEDURE — 99999 PR PBB SHADOW E&M-EST. PATIENT-LVL IV: CPT | Mod: PBBFAC,,, | Performed by: FAMILY MEDICINE

## 2024-11-21 PROCEDURE — 80053 COMPREHEN METABOLIC PANEL: CPT | Performed by: FAMILY MEDICINE

## 2024-11-21 PROCEDURE — 84443 ASSAY THYROID STIM HORMONE: CPT | Performed by: FAMILY MEDICINE

## 2024-11-21 PROCEDURE — 3075F SYST BP GE 130 - 139MM HG: CPT | Mod: CPTII,S$GLB,, | Performed by: FAMILY MEDICINE

## 2024-11-21 PROCEDURE — 3061F NEG MICROALBUMINURIA REV: CPT | Mod: CPTII,S$GLB,, | Performed by: FAMILY MEDICINE

## 2024-11-21 PROCEDURE — 3052F HG A1C>EQUAL 8.0%<EQUAL 9.0%: CPT | Mod: CPTII,S$GLB,, | Performed by: FAMILY MEDICINE

## 2024-11-21 PROCEDURE — 99214 OFFICE O/P EST MOD 30 MIN: CPT | Mod: S$GLB,,, | Performed by: FAMILY MEDICINE

## 2024-11-21 PROCEDURE — 4010F ACE/ARB THERAPY RXD/TAKEN: CPT | Mod: CPTII,S$GLB,, | Performed by: FAMILY MEDICINE

## 2024-11-21 PROCEDURE — 36415 COLL VENOUS BLD VENIPUNCTURE: CPT | Mod: PO | Performed by: FAMILY MEDICINE

## 2024-11-21 PROCEDURE — 3079F DIAST BP 80-89 MM HG: CPT | Mod: CPTII,S$GLB,, | Performed by: FAMILY MEDICINE

## 2024-11-21 PROCEDURE — 3066F NEPHROPATHY DOC TX: CPT | Mod: CPTII,S$GLB,, | Performed by: FAMILY MEDICINE

## 2024-11-21 PROCEDURE — 3008F BODY MASS INDEX DOCD: CPT | Mod: CPTII,S$GLB,, | Performed by: FAMILY MEDICINE

## 2024-11-21 PROCEDURE — 1159F MED LIST DOCD IN RCRD: CPT | Mod: CPTII,S$GLB,, | Performed by: FAMILY MEDICINE

## 2024-11-21 RX ORDER — FINASTERIDE 5 MG/1
5 TABLET, FILM COATED ORAL
COMMUNITY
Start: 2024-11-08

## 2024-11-21 RX ORDER — FLUTICASONE PROPIONATE 50 MCG
SPRAY, SUSPENSION (ML) NASAL
Qty: 16 G | Refills: 8 | Status: SHIPPED | OUTPATIENT
Start: 2024-11-21

## 2024-11-21 NOTE — PROGRESS NOTES
"Subjective:       Patient ID: Wily Rodrigues is a 59 y.o. male.    Chief Complaint: Diabetes    Diabetes  Pertinent negatives for diabetes include no chest pain, no fatigue, no polydipsia and no polyuria.     Pt is here for follow up of dm restarting mounjaro metformin amaryl actos inconsistently hgb A1c elevated but pt is getting back on track  Pt has htn bp fine no sob/cp on ace no cough  Pt has hypercholesterolemia on statin no muscle aches   Pt has hypothyroid no temp intolerance stable on low dose synthroid  Review of Systems   Constitutional:  Negative for chills, fatigue and fever.   Respiratory:  Negative for cough, chest tightness and shortness of breath.    Cardiovascular:  Negative for chest pain and palpitations.   Gastrointestinal:  Negative for abdominal distention, abdominal pain and blood in stool.   Endocrine: Negative for polydipsia and polyuria.       Objective:    /89   Pulse 77   Wt 95.3 kg (210 lb)   SpO2 99%   BMI 30.13 kg/m²     Physical Exam  Constitutional:       Appearance: He is obese. He is not ill-appearing.   Cardiovascular:      Rate and Rhythm: Normal rate and regular rhythm.      Heart sounds:      No gallop.   Pulmonary:      Effort: Pulmonary effort is normal. No respiratory distress.   Neurological:      General: No focal deficit present.      Mental Status: He is alert and oriented to person, place, and time.      Cranial Nerves: No cranial nerve deficit.      Coordination: Coordination normal.   Psychiatric:         Mood and Affect: Mood normal.         Behavior: Behavior normal.         Thought Content: Thought content normal.         Judgment: Judgment normal.       Hgb A1c 9 in 9/2024  Assessment:       1. Diabetes mellitus type 2 in obese    2. Essential hypertension    3. Allergic rhinitis, unspecified seasonality, unspecified trigger        Plan:     Orders hgb A1c cmp tsh  Cont meds  Ada diet  Graded exercise  Rtc quarterly       "This note will not be " "shared with the patient."     "

## 2024-12-30 ENCOUNTER — PATIENT MESSAGE (OUTPATIENT)
Dept: FAMILY MEDICINE | Facility: CLINIC | Age: 59
End: 2024-12-30
Payer: COMMERCIAL

## 2025-01-29 DIAGNOSIS — E11.9 TYPE 2 DIABETES MELLITUS WITHOUT COMPLICATION: ICD-10-CM

## 2025-02-02 DIAGNOSIS — F90.0 ATTENTION DEFICIT HYPERACTIVITY DISORDER (ADHD), PREDOMINANTLY INATTENTIVE TYPE: Primary | ICD-10-CM

## 2025-02-02 RX ORDER — METHYLPHENIDATE HYDROCHLORIDE 36 MG/1
36 TABLET ORAL DAILY
Qty: 90 TABLET | Refills: 0 | Status: SHIPPED | OUTPATIENT
Start: 2025-02-02

## 2025-02-02 RX ORDER — METHYLPHENIDATE HYDROCHLORIDE 20 MG/1
20 TABLET ORAL DAILY
Qty: 90 TABLET | Refills: 0 | Status: SHIPPED | OUTPATIENT
Start: 2025-02-02

## 2025-02-14 RX ORDER — LEVOTHYROXINE SODIUM 25 UG/1
25 TABLET ORAL
Qty: 90 TABLET | Refills: 3 | Status: SHIPPED | OUTPATIENT
Start: 2025-02-14

## 2025-02-14 NOTE — TELEPHONE ENCOUNTER
Refill Decision Note   Wily Rodrigues  is requesting a refill authorization.  Brief Assessment and Rationale for Refill:  Approve     Medication Therapy Plan:  MRM resolved; APPROVE      Comments:     Note composed:3:10 AM 02/14/2025

## 2025-02-14 NOTE — TELEPHONE ENCOUNTER
No care due was identified.  Health [facility] Embedded Care Due Messages. Reference number: [id number].   2/14/2025 12:48:10 AM CST

## 2025-02-26 DIAGNOSIS — E11.9 TYPE 2 DIABETES MELLITUS WITHOUT COMPLICATION: ICD-10-CM

## 2025-03-06 ENCOUNTER — PATIENT OUTREACH (OUTPATIENT)
Dept: ADMINISTRATIVE | Facility: HOSPITAL | Age: 60
End: 2025-03-06
Payer: COMMERCIAL

## 2025-03-08 ENCOUNTER — OFFICE VISIT (OUTPATIENT)
Dept: URGENT CARE | Facility: CLINIC | Age: 60
End: 2025-03-08
Payer: COMMERCIAL

## 2025-03-08 VITALS
DIASTOLIC BLOOD PRESSURE: 85 MMHG | RESPIRATION RATE: 20 BRPM | SYSTOLIC BLOOD PRESSURE: 145 MMHG | HEART RATE: 81 BPM | TEMPERATURE: 99 F | BODY MASS INDEX: 30.08 KG/M2 | OXYGEN SATURATION: 99 % | HEIGHT: 70 IN | WEIGHT: 210.13 LBS

## 2025-03-08 DIAGNOSIS — J20.9 ACUTE BRONCHITIS, UNSPECIFIED ORGANISM: Primary | ICD-10-CM

## 2025-03-08 RX ORDER — IPRATROPIUM BROMIDE 0.5 MG/2.5ML
0.5 SOLUTION RESPIRATORY (INHALATION)
Status: COMPLETED | OUTPATIENT
Start: 2025-03-08 | End: 2025-03-08

## 2025-03-08 RX ORDER — ALBUTEROL SULFATE 90 UG/1
2 INHALANT RESPIRATORY (INHALATION) EVERY 6 HOURS PRN
Qty: 18 G | Refills: 0 | Status: SHIPPED | OUTPATIENT
Start: 2025-03-08 | End: 2026-03-08

## 2025-03-08 RX ORDER — CODEINE PHOSPHATE AND GUAIFENESIN 10; 100 MG/5ML; MG/5ML
5 SOLUTION ORAL EVERY 6 HOURS PRN
Qty: 60 ML | Refills: 0 | Status: SHIPPED | OUTPATIENT
Start: 2025-03-08 | End: 2025-03-11

## 2025-03-08 RX ORDER — BENZONATATE 200 MG/1
200 CAPSULE ORAL 3 TIMES DAILY PRN
Qty: 30 CAPSULE | Refills: 0 | Status: SHIPPED | OUTPATIENT
Start: 2025-03-08 | End: 2025-03-18

## 2025-03-08 RX ORDER — PREDNISONE 20 MG/1
40 TABLET ORAL DAILY
Qty: 10 TABLET | Refills: 0 | Status: SHIPPED | OUTPATIENT
Start: 2025-03-08 | End: 2025-03-13

## 2025-03-08 RX ORDER — ALBUTEROL SULFATE 0.83 MG/ML
2.5 SOLUTION RESPIRATORY (INHALATION)
Status: COMPLETED | OUTPATIENT
Start: 2025-03-08 | End: 2025-03-08

## 2025-03-08 RX ADMIN — IPRATROPIUM BROMIDE 0.5 MG: 0.5 SOLUTION RESPIRATORY (INHALATION) at 02:03

## 2025-03-08 RX ADMIN — ALBUTEROL SULFATE 2.5 MG: 0.83 SOLUTION RESPIRATORY (INHALATION) at 02:03

## 2025-03-08 NOTE — PROGRESS NOTES
"Subjective:      Patient ID: Wily Rodrigues is a 60 y.o. male.    Vitals:  height is 5' 10" (1.778 m) and weight is 95.3 kg (210 lb 1.6 oz). His oral temperature is 98.8 °F (37.1 °C). His blood pressure is 145/85 (abnormal) and his pulse is 81. His respiration is 20 and oxygen saturation is 99%.     Chief Complaint: Cough    60 year old male c/o cough and chest congestion and mild wheezing for few days.  Pt states he has recurrent bronchitis. Pt states coughing for three days, each day the cough has gotten persistent and hurting his chest. Pt states slight sore throat from coughing , bilateral ears feel full.  oarse voice, runny nose, nasal congestion, and postnasal drip. Pt took mucinex and advil.      P.S-   patient states that when he feels this way which is about once a year you will receive a nebulizer treatment in clinic and sent home with p.o. medications which usually improves symptoms..     Cough  This is a recurrent problem. The current episode started in the past 7 days. The problem has been gradually worsening. The problem occurs every few minutes. The cough is Non-productive (wet sounding). Associated symptoms include chest pain, ear congestion, nasal congestion, postnasal drip, rhinorrhea and a sore throat. Pertinent negatives include no chills, fever, headaches or sweats. Exacerbated by: movement and deep breaths. Treatments tried: mucinex and advil. The treatment provided mild relief.     Constitution: Negative for chills and fever.   HENT:  Positive for postnasal drip and sore throat.    Cardiovascular:  Positive for chest pain.   Respiratory:  Positive for cough.    Neurological:  Negative for headaches.      Objective:     Physical Exam  Constitutional: Pt oriented to person, place, and time.  Non-toxic appearance.   Patient does not appear ill. No distress. normal  HENT: No icterus or facial swelling appreciated  Head: Normocephalic and atraumatic.   Nose: No congestion.   Pulmonary/Chest: " Effort normal. No stridor. No respiratory distress.  No audible wheeze or rhonchi but there is a slight prolonged expiratory phase.  Abdominal: Normal appearance. Abdomen exhibits no distension.   Musculoskeletal:         General: No swelling.   Neurological: no focal deficit. Patient is alert and oriented to person, place, and time.   Skin: Skin is not diaphoretic and not pale. no jaundice  Psychiatric: Patients behavior is normal. Mood, judgment and thought content normal.     Assessment:     1. Acute bronchitis, unspecified organism        Plan:       Acute bronchitis, unspecified organism  -     albuterol nebulizer solution 2.5 mg  -     ipratropium 0.02 % nebulizer solution 0.5 mg  -     albuterol (VENTOLIN HFA) 90 mcg/actuation inhaler; Inhale 2 puffs into the lungs every 6 (six) hours as needed for Wheezing. Rescue  Dispense: 18 g; Refill: 0  -     benzonatate (TESSALON) 200 MG capsule; Take 1 capsule (200 mg total) by mouth 3 (three) times daily as needed for Cough.  Dispense: 30 capsule; Refill: 0  -     predniSONE (DELTASONE) 20 MG tablet; Take 2 tablets (40 mg total) by mouth once daily. for 5 days  Dispense: 10 tablet; Refill: 0  -     guaiFENesin-codeine 100-10 mg/5 ml (TUSSI-ORGANIDIN NR)  mg/5 mL syrup; Take 5 mLs by mouth every 6 (six) hours as needed for Cough or Congestion.  Dispense: 60 mL; Refill: 0

## 2025-03-19 RX ORDER — CETIRIZINE HYDROCHLORIDE 10 MG/1
10 TABLET ORAL
Qty: 90 TABLET | Refills: 2 | Status: SHIPPED | OUTPATIENT
Start: 2025-03-19

## 2025-03-19 NOTE — TELEPHONE ENCOUNTER
Provider Staff:  Action required for this patient    Requires labs      Please see care gap opportunities below in Care Due Message.    Thanks!  [facility]     Appointments      Date Provider   Last Visit   11/21/2024 Areli Kramer MD   Next Visit   Visit date not found Areli Kramer MD     Refill Decision Note   Wily Rodrigues  is requesting a refill authorization.  Brief Assessment and Rationale for Refill:  Approve     Medication Therapy Plan:         Comments:     Note composed:1:53 PM 03/19/2025

## 2025-03-19 NOTE — TELEPHONE ENCOUNTER
Care Due:                  Date            Visit Type   Department     Provider  --------------------------------------------------------------------------------                                MYCHART                              FOLLOWUP/OF  [facility]  Last Visit: 11-      [facility]ICE VISIT   MEDICINE       Areli Kramer  Next Visit: None Scheduled  None         None Found                                                            Last  Test          Frequency    Reason                     Performed    Due Date  --------------------------------------------------------------------------------    HBA1C.......  6 months...  glimepiride, metFORMIN,    11- 05-                             [facility]    [facility] Embedded Care Due Messages. Reference number: [id number].   3/19/2025 7:28:36 AM CDT

## 2025-03-21 RX ORDER — GLIMEPIRIDE 4 MG/1
4 TABLET ORAL
Qty: 90 TABLET | Refills: 0 | Status: SHIPPED | OUTPATIENT
Start: 2025-03-21

## 2025-03-21 NOTE — TELEPHONE ENCOUNTER
No care due was identified.  [facility] Embedded Care Due Messages. Reference number: [id number].   3/21/2025 1:57:28 PM CDT

## 2025-03-22 NOTE — TELEPHONE ENCOUNTER
Refill Decision Note   Wily Rodrigues  is requesting a refill authorization.  Brief Assessment and Rationale for Refill:  Approve     Medication Therapy Plan:        Comments:     Note composed:8:38 PM 03/21/2025

## 2025-05-07 DIAGNOSIS — E11.9 TYPE 2 DIABETES MELLITUS WITHOUT COMPLICATION: ICD-10-CM

## 2025-05-13 DIAGNOSIS — F90.0 ATTENTION DEFICIT HYPERACTIVITY DISORDER (ADHD), PREDOMINANTLY INATTENTIVE TYPE: ICD-10-CM

## 2025-05-13 RX ORDER — METHYLPHENIDATE HYDROCHLORIDE 36 MG/1
36 TABLET ORAL DAILY
Qty: 90 TABLET | Refills: 0 | Status: SHIPPED | OUTPATIENT
Start: 2025-05-13

## 2025-05-13 RX ORDER — DULOXETIN HYDROCHLORIDE 60 MG/1
60 CAPSULE, DELAYED RELEASE ORAL DAILY
Qty: 90 CAPSULE | Refills: 0 | Status: SHIPPED | OUTPATIENT
Start: 2025-05-13

## 2025-05-13 RX ORDER — METHYLPHENIDATE HYDROCHLORIDE 20 MG/1
20 TABLET ORAL DAILY
Qty: 90 TABLET | Refills: 0 | Status: SHIPPED | OUTPATIENT
Start: 2025-05-13

## 2025-05-22 ENCOUNTER — PATIENT MESSAGE (OUTPATIENT)
Dept: ADMINISTRATIVE | Facility: HOSPITAL | Age: 60
End: 2025-05-22
Payer: COMMERCIAL

## 2025-06-11 DIAGNOSIS — E11.9 TYPE 2 DIABETES MELLITUS WITHOUT COMPLICATION, UNSPECIFIED WHETHER LONG TERM INSULIN USE: ICD-10-CM

## 2025-06-11 NOTE — TELEPHONE ENCOUNTER
No care due was identified.  [facility] Embedded Care Due Messages. Reference number: [id number].   6/11/2025 6:28:24 PM CDT

## 2025-06-11 NOTE — TELEPHONE ENCOUNTER
Care Due:                  Date            Visit Type   Department     Provider  --------------------------------------------------------------------------------                                MYCHART                              FOLLOWUP/OF  [facility]  Last Visit: 11-      FICE VISIT   MEDICINE       Areli Kramer  Next Visit: None Scheduled  None         None Found                                                            Last  Test          Frequency    Reason                     Performed    Due Date  --------------------------------------------------------------------------------    HBA1C.......  6 months...  glimepiride, metFORMIN,    11- 05-                             pioglitazone, tirzepatide    Health [facility] Embedded Care Due Messages. Reference number: [id number].   6/11/2025 6:26:14 PM CDT

## 2025-06-11 NOTE — TELEPHONE ENCOUNTER
No care due was identified.  [facility] Embedded Care Due Messages. Reference number: [id number].   6/11/2025 6:27:24 PM CDT

## 2025-06-12 RX ORDER — TIRZEPATIDE 7.5 MG/.5ML
7.5 INJECTION, SOLUTION SUBCUTANEOUS
Qty: 12 PEN | Refills: 0 | Status: SHIPPED | OUTPATIENT
Start: 2025-06-12

## 2025-06-12 RX ORDER — METFORMIN HYDROCHLORIDE 500 MG/1
500 TABLET, EXTENDED RELEASE ORAL DAILY
Qty: 90 TABLET | Refills: 3 | Status: SHIPPED | OUTPATIENT
Start: 2025-06-12

## 2025-06-12 RX ORDER — PIOGLITAZONE 30 MG/1
30 TABLET ORAL DAILY
Qty: 90 TABLET | Refills: 3 | Status: SHIPPED | OUTPATIENT
Start: 2025-06-12

## 2025-06-12 NOTE — TELEPHONE ENCOUNTER
Refill Encounter    PCP Visits: Recent Visits  Date Type Provider Dept   11/21/24 Office Visit Areli Kramer MD [facility]   Showing recent visits within past 360 days and meeting all other requirements  Future Appointments  No visits were found meeting these conditions.  Showing future appointments within next 720 days and meeting all other requirements     Last 3 Blood Pressure:   BP Readings from Last 3 Encounters:   03/08/25 (!) 145/85   11/21/24 137/89   05/05/24 (!) 149/95     Preferred Pharmacy:   [facility]/pharmacy #[id number] - [place], [place] - [address]  [address]  [place] [address]  Phone: [phone] Fax: [phone]    Requested RX:  Requested Prescriptions     Pending Prescriptions Disp Refills    pioglitazone (ACTOS) 30 MG tablet 90 tablet 0     Sig: Take 1 tablet (30 mg total) by mouth.      RX Route: Normal

## 2025-06-12 NOTE — TELEPHONE ENCOUNTER
Refill Routing Note   Medication(s) are not appropriate for processing by [facility] for the following reason(s):        Required labs outdated    [facility] action(s):  Defer             Appointments  past 12m or future 3m with PCP    Date Provider   Last Visit   11/21/2024 Areli Kramer MD   Next Visit   6/11/2025 Areli Kramer MD   ED visits in past 90 days: 0        Note composed:9:45 PM 06/11/2025

## 2025-06-12 NOTE — TELEPHONE ENCOUNTER
Refill Encounter    PCP Visits: Recent Visits  Date Type Provider Dept   11/21/24 Office Visit Areli Kramer MD [facility]   Showing recent visits within past 360 days and meeting all other requirements  Future Appointments  No visits were found meeting these conditions.  Showing future appointments within next 720 days and meeting all other requirements     Last 3 Blood Pressure:   BP Readings from Last 3 Encounters:   03/08/25 (!) 145/85   11/21/24 137/89   05/05/24 (!) 149/95     Preferred Pharmacy:   [facility]/pharmacy #[id number] - [place], [place] - [address]  [address]  [place] [address]  Phone: [phone] Fax: [phone]    Requested RX:  Requested Prescriptions     Pending Prescriptions Disp Refills    metFORMIN (GLUCOPHAGE-XR) 500 MG ER 24hr tablet 90 tablet 0     Sig: Take 1 tablet (500 mg total) by mouth.      RX Route: Normal

## 2025-06-17 NOTE — TELEPHONE ENCOUNTER
Refill Routing Note   Medication(s) are not appropriate for processing by [facility] for the following reason(s):        Required labs outdated    [facility] action(s):  Defer             Appointments  past 12m or future 3m with PCP    Date Provider   Last Visit   11/21/2024 Areli Kramer MD   Next Visit   Visit date not found Areli Kramer MD   ED visits in past 90 days: 0        Note composed:4:37 PM 06/17/2025

## 2025-06-17 NOTE — TELEPHONE ENCOUNTER
No care due was identified.  [facility] Embedded Care Due Messages. Reference number: [id number].   6/17/2025 4:19:04 PM CDT

## 2025-06-19 RX ORDER — GLIMEPIRIDE 4 MG/1
4 TABLET ORAL
Qty: 90 TABLET | Refills: 0 | Status: SHIPPED | OUTPATIENT
Start: 2025-06-19

## 2025-07-05 ENCOUNTER — OFFICE VISIT (OUTPATIENT)
Dept: URGENT CARE | Facility: CLINIC | Age: 60
End: 2025-07-05
Payer: COMMERCIAL

## 2025-07-05 VITALS
HEART RATE: 81 BPM | BODY MASS INDEX: 30.3 KG/M2 | RESPIRATION RATE: 20 BRPM | SYSTOLIC BLOOD PRESSURE: 128 MMHG | DIASTOLIC BLOOD PRESSURE: 77 MMHG | TEMPERATURE: 98 F | HEIGHT: 70 IN | WEIGHT: 211.63 LBS | OXYGEN SATURATION: 95 %

## 2025-07-05 DIAGNOSIS — R73.09 ABNORMAL GLUCOSE: ICD-10-CM

## 2025-07-05 DIAGNOSIS — R06.2 WHEEZING: Primary | ICD-10-CM

## 2025-07-05 LAB — GLUCOSE SERPL-MCNC: 296 MG/DL (ref 70–110)

## 2025-07-05 PROCEDURE — 99214 OFFICE O/P EST MOD 30 MIN: CPT | Mod: 25,S$GLB,,

## 2025-07-05 PROCEDURE — 94640 AIRWAY INHALATION TREATMENT: CPT | Mod: S$GLB,,,

## 2025-07-05 PROCEDURE — 82962 GLUCOSE BLOOD TEST: CPT | Mod: S$GLB,,,

## 2025-07-05 RX ORDER — ALBUTEROL SULFATE 90 UG/1
2 INHALANT RESPIRATORY (INHALATION) EVERY 6 HOURS PRN
Qty: 18 G | Refills: 0 | Status: SHIPPED | OUTPATIENT
Start: 2025-07-05 | End: 2026-07-05

## 2025-07-05 RX ORDER — ALBUTEROL SULFATE 0.83 MG/ML
2.5 SOLUTION RESPIRATORY (INHALATION)
Status: COMPLETED | OUTPATIENT
Start: 2025-07-05 | End: 2025-07-05

## 2025-07-05 RX ADMIN — ALBUTEROL SULFATE 2.5 MG: 0.83 SOLUTION RESPIRATORY (INHALATION) at 12:07

## 2025-07-05 NOTE — PATIENT INSTRUCTIONS
- You must understand that you have received an Urgent Care treatment only and that you may be released before all of your medical problems are known or treated.   - You, the patient, will arrange for follow up care as instructed.   - If your condition worsens or fails to improve we recommend that you receive another evaluation at the ER immediately or contact your PCP to discuss your concerns or return here.  Follow up with PCP as discussed.  Discussed with PCP- CGM monitoring and nebulizer for home use.  Please get plenty of rest.  Monitor food triggers causing increase in glucose.

## 2025-07-05 NOTE — PROGRESS NOTES
"Subjective:      Patient ID: Wily Rodrigues is a 60 y.o. male.    Vitals:  height is 5' 10" (1.778 m) and weight is 96 kg (211 lb 10.3 oz). His temperature is 98.3 °F (36.8 °C). His blood pressure is 128/77 and his pulse is 81. His respiration is 20 and oxygen saturation is 95%.     Chief Complaint: Asthma    Wily Rodrigues is a 60 y.o. male who presents today with a chief complaint of asthma flare up that began a week ago. Patient states he lost his emergency inhaler. Patient requesting breathing treatment in clinic. Pt states his ears are clogged and he is experiencing fatigue. Declines any fevers or chills. States does not always check blood glucose at home.     Asthma  He complains of chest tightness and wheezing. The current episode started 1 to 4 weeks ago. His past medical history is significant for asthma.       Constitution: Negative.   HENT: Negative.     Neck: neck negative.   Cardiovascular: Negative.    Eyes: Negative.    Respiratory:  Positive for wheezing.    Gastrointestinal: Negative.    Endocrine: negative.   Genitourinary: Negative.    Musculoskeletal: Negative.    Skin: Negative.    Allergic/Immunologic: Negative.    Neurological: Negative.    Hematologic/Lymphatic: Negative.    Psychiatric/Behavioral: Negative.        Objective:     Physical Exam   Constitutional: He is oriented to person, place, and time.  Non-toxic appearance. No distress.   HENT:   Head: Normocephalic and atraumatic.   Ears:   Right Ear: External ear normal.   Left Ear: External ear normal.   Nose: Nose normal.   Cardiovascular: Normal rate.   Pulmonary/Chest: No stridor. No respiratory distress. He has wheezes. He has no rhonchi. He has no rales.   Musculoskeletal: Normal range of motion.         General: Normal range of motion.   Neurological: He is alert and oriented to person, place, and time.   Skin: Skin is warm, dry and not diaphoretic.   Psychiatric: His behavior is normal. Mood, judgment and thought " content normal.     Results for orders placed or performed in visit on 07/05/25   POCT Glucose, Hand-Held Device    Collection Time: 07/05/25  1:14 PM   Result Value Ref Range    POC Glucose 296 (A) 70 - 110 MG/DL       Assessment:     1. Wheezing    2. Abnormal glucose        Plan:   I have reviewed the patient chart and pertinent past imaging/labs.   ED precautions given.  Encouraged patient to follow up with pcp to discuss glucose control. Patient verbalized understanding and agreeable to plan of care.   Patient had relief with breathing treatment in clinic.     Wheezing  -     albuterol nebulizer solution 2.5 mg  -     albuterol (VENTOLIN HFA) 90 mcg/actuation inhaler; Inhale 2 puffs into the lungs every 6 (six) hours as needed for Wheezing. Rescue  Dispense: 18 g; Refill: 0    Abnormal glucose  -     POCT Glucose, Hand-Held Device      Patient Instructions   - You must understand that you have received an Urgent Care treatment only and that you may be released before all of your medical problems are known or treated.   - You, the patient, will arrange for follow up care as instructed.   - If your condition worsens or fails to improve we recommend that you receive another evaluation at the ER immediately or contact your PCP to discuss your concerns or return here.  Follow up with PCP as discussed.  Discussed with PCP- CGM monitoring and nebulizer for home use.  Please get plenty of rest.  Monitor food triggers causing increase in glucose.

## 2025-07-23 RX ORDER — VALACYCLOVIR HYDROCHLORIDE 1 G/1
1000 TABLET, FILM COATED ORAL 2 TIMES DAILY
Qty: 180 TABLET | Refills: 0 | Status: SHIPPED | OUTPATIENT
Start: 2025-07-23

## 2025-07-23 NOTE — TELEPHONE ENCOUNTER
Care Due:                  Date            Visit Type   Department     Provider  --------------------------------------------------------------------------------                                MYCHART                              FOLLOWUP/OF  [facility]  Last Visit: 11-      FICE VISIT   MEDICINE       Areli Kramer                              EP -                              PRIMARY      [facility]  Next Visit: 09-      CARE ([facility])   MEDICINE       Areli Kramer                                                            Last  Test          Frequency    Reason                     Performed    Due Date  --------------------------------------------------------------------------------    CBC.........  12 months..  valACYclovir.............  01-   01-    Lipid Panel.  12 months..  atorvastatin.............  04- 04-    [facility] Embedded Care Due Messages. Reference number: [id number].   7/22/2025 9:52:45 PM CDT

## 2025-07-23 NOTE — TELEPHONE ENCOUNTER
Refill Routing Note   Medication(s) are not appropriate for processing by [facility] for the following reason(s):        Required labs outdated    [facility] action(s):  Defer     Requires labs : Yes             Appointments  past 12m or future 3m with PCP    Date Provider   Last Visit   11/21/2024 Areli Kramer MD   Next Visit   9/18/2025 Areli Kramer MD   ED visits in past 90 days: 0        Note composed:2:26 AM 07/23/2025

## 2025-08-13 RX ORDER — DULOXETIN HYDROCHLORIDE 60 MG/1
60 CAPSULE, DELAYED RELEASE ORAL
Qty: 90 CAPSULE | Refills: 0 | Status: SHIPPED | OUTPATIENT
Start: 2025-08-13

## 2025-08-31 RX ORDER — TIRZEPATIDE 7.5 MG/.5ML
7.5 INJECTION, SOLUTION SUBCUTANEOUS
Qty: 12 PEN | Refills: 0 | Status: CANCELLED | OUTPATIENT
Start: 2025-08-31

## 2025-09-01 RX ORDER — TIRZEPATIDE 7.5 MG/.5ML
7.5 INJECTION, SOLUTION SUBCUTANEOUS
Qty: 12 PEN | Refills: 0 | Status: CANCELLED | OUTPATIENT
Start: 2025-08-31

## 2025-09-06 DIAGNOSIS — E66.9 DIABETES MELLITUS TYPE 2 IN OBESE: Primary | ICD-10-CM

## 2025-09-06 DIAGNOSIS — E11.69 DIABETES MELLITUS TYPE 2 IN OBESE: Primary | ICD-10-CM

## 2025-09-06 RX ORDER — TIRZEPATIDE 7.5 MG/.5ML
7.5 INJECTION, SOLUTION SUBCUTANEOUS
Qty: 12 PEN | Refills: 3 | Status: SHIPPED | OUTPATIENT
Start: 2025-09-06